# Patient Record
Sex: FEMALE | Race: WHITE | NOT HISPANIC OR LATINO | Employment: OTHER | URBAN - METROPOLITAN AREA
[De-identification: names, ages, dates, MRNs, and addresses within clinical notes are randomized per-mention and may not be internally consistent; named-entity substitution may affect disease eponyms.]

---

## 2017-01-02 ENCOUNTER — TELEPHONE (OUTPATIENT)
Dept: FAMILY MEDICINE | Facility: CLINIC | Age: 52
End: 2017-01-02

## 2017-01-02 NOTE — TELEPHONE ENCOUNTER
Received PA request for Lidocaine 5% patch from [facility]  Pharmacy Rejection Note: Plan does not cover this medication    Sig: Apply 1/2  patch to affected area on each arm at once for up to 12 h within a 24 h period.  Remove after 12 hours.  Disp: 28 per 30  TY: no    No previous PA on file for this med.    Dx: Lesion of ulnar nerve, unspecified laterality [G56.20]   Rationale: Tx of Lesion of ulnar nerve, unspecified laterality [G56.20]     Provided Ins: not provided  Provided Ins ID: [id number]  Provided Ins Phone # [phone] [facility]    PA submitted to [employer] via [employer], Keycode [id number]    Brigido OBRIEN (r)  SHAHEEN [place]ho

## 2017-01-03 NOTE — TELEPHONE ENCOUNTER
This patch is being used for neuropathic pain related to the ulnar nerve.  If the diagnosis of neuropathic pain is accepted we can appeal.  Please notify pt.    Lulu Lindsey

## 2017-01-03 NOTE — TELEPHONE ENCOUNTER
Received response from [email or web address]        Response faxed to the pharmacy, sent to be scanned, routed to the provider    Brigido OBRIEN (ewa)  [facility]

## 2017-01-03 NOTE — TELEPHONE ENCOUNTER
These are the qualifying questions, did I answer any of these wrong?      If you are going to appeal, get me the letter and I will fax it.          Brigido Linton RT (r)  [facility]

## 2017-01-03 NOTE — TELEPHONE ENCOUNTER
Nope, you answered correctly.  It is a neuropathy but not for any of those reasons.  Thanks.    Please let pt know.  Lulu Lindsey

## 2017-02-21 ENCOUNTER — COMMUNICATION - HEALTHEAST (OUTPATIENT)
Dept: SURGERY | Facility: CLINIC | Age: 52
End: 2017-02-21

## 2017-02-21 DIAGNOSIS — Z98.84 HX OF GASTRIC BYPASS: ICD-10-CM

## 2017-02-21 DIAGNOSIS — R12 HEARTBURN: ICD-10-CM

## 2017-02-24 DIAGNOSIS — E03.9 HYPOTHYROIDISM, UNSPECIFIED TYPE: ICD-10-CM

## 2017-02-24 NOTE — TELEPHONE ENCOUNTER
Routing refill request to provider for review/approval because:  Labs out of range:  TSH elevated  Camryn Miller RN

## 2017-02-24 NOTE — TELEPHONE ENCOUNTER
Levothyroxine 75mcg     Last Written Prescription Date: 11/02/2016 #30 x 0  Last filled 01/28/2017  Last Office Visit with G, P or [facility] prescribing provider: 08/02/2016 ANEESH Lindsey        TSH   Date Value Ref Range Status   07/08/2016 79.60 (H) 0.40 - 4.00 mU/L Final

## 2017-02-27 RX ORDER — LEVOTHYROXINE SODIUM 75 UG/1
75 TABLET ORAL DAILY
Qty: 30 TABLET | Refills: 0 | Status: SHIPPED | OUTPATIENT
Start: 2017-02-27 | End: 2017-04-19 | Stop reason: DRUGHIGH

## 2017-04-17 ENCOUNTER — OFFICE VISIT (OUTPATIENT)
Dept: FAMILY MEDICINE | Facility: CLINIC | Age: 52
End: 2017-04-17
Payer: MEDICARE

## 2017-04-17 VITALS
TEMPERATURE: 98.6 F | HEART RATE: 96 BPM | DIASTOLIC BLOOD PRESSURE: 64 MMHG | WEIGHT: 99.2 LBS | HEIGHT: 62 IN | SYSTOLIC BLOOD PRESSURE: 96 MMHG | BODY MASS INDEX: 18.26 KG/M2

## 2017-04-17 DIAGNOSIS — E03.9 HYPOTHYROIDISM, UNSPECIFIED TYPE: ICD-10-CM

## 2017-04-17 DIAGNOSIS — G56.23 LESIONS OF BOTH ULNAR NERVES: Primary | ICD-10-CM

## 2017-04-17 DIAGNOSIS — G47.00 PERSISTENT DISORDER OF INITIATING OR MAINTAINING SLEEP: ICD-10-CM

## 2017-04-17 DIAGNOSIS — M48.02 CERVICAL SPINAL STENOSIS: ICD-10-CM

## 2017-04-17 LAB — TSH SERPL DL<=0.05 MIU/L-ACNC: 21.7 MU/L (ref 0.4–4)

## 2017-04-17 PROCEDURE — 84443 ASSAY THYROID STIM HORMONE: CPT | Performed by: FAMILY MEDICINE

## 2017-04-17 PROCEDURE — 36415 COLL VENOUS BLD VENIPUNCTURE: CPT | Performed by: FAMILY MEDICINE

## 2017-04-17 PROCEDURE — 99214 OFFICE O/P EST MOD 30 MIN: CPT | Performed by: FAMILY MEDICINE

## 2017-04-17 RX ORDER — GABAPENTIN 100 MG/1
CAPSULE ORAL
Qty: 90 CAPSULE | Refills: 1 | Status: SHIPPED | OUTPATIENT
Start: 2017-04-17 | End: 2017-05-19

## 2017-04-17 RX ORDER — ZOLPIDEM TARTRATE 10 MG/1
10 TABLET ORAL
Qty: 30 TABLET | Refills: 5 | Status: SHIPPED | OUTPATIENT
Start: 2017-04-17 | End: 2017-10-20

## 2017-04-17 RX ORDER — TRAMADOL HYDROCHLORIDE 50 MG/1
50 TABLET ORAL EVERY 6 HOURS PRN
Qty: 28 TABLET | Refills: 0 | Status: SHIPPED | OUTPATIENT
Start: 2017-04-17 | End: 2017-12-26

## 2017-04-17 RX ORDER — GABAPENTIN 300 MG/1
CAPSULE ORAL
Qty: 90 CAPSULE | Refills: 0 | Status: SHIPPED | OUTPATIENT
Start: 2017-04-17 | End: 2017-04-17 | Stop reason: DRUGHIGH

## 2017-04-17 NOTE — PATIENT INSTRUCTIONS
We'll try adding the gabapentin for your arm pain.  This can cause drowsiness as well, especially combined with your topiramate and the ambien.  Please be cautious when you are first starting this.  We should follow up on this in about 1 month    I'll let you know the TSH results when available.  Please make sure to get on your vitamins right away.    I'm glad you are feeling better!

## 2017-04-17 NOTE — MR AVS SNAPSHOT
After Visit Summary   4/17/2017    Jackie Rodriguez    MRN: [id number]           Patient Information     Date Of Birth          1965        Visit Information        Provider Department      4/17/2017 1:20 PM Lulu Lindsey DO [facility]        Today's Diagnoses     Lesions of both ulnar nerves    -  1    Hypothyroidism, unspecified type        Persistent disorder of initiating or maintaining sleep        Cervical spinal stenosis C5-6, C6-7 discs and foraminal stenosis          Care Instructions    We'll try adding the gabapentin for your arm pain.  This can cause drowsiness as well, especially combined with your topiramate and the ambien.  Please be cautious when you are first starting this.  We should follow up on this in about 1 month    I'll let you know the TSH results when available.  Please make sure to get on your vitamins right away.    I'm glad you are feeling better!            Follow-ups after your visit        Who to contact     Normal or non-critical lab and imaging results will be communicated to you by Zeushart, letter or phone within 4 business days after the clinic has received the results. If you do not hear from us within 7 days, please contact the clinic through HelloWallett or phone. If you have a critical or abnormal lab result, we will notify you by phone as soon as possible.  Submit refill requests through Hemoteq or call your pharmacy and they will forward the refill request to us. Please allow 3 business days for your refill to be completed.          If you need to speak with a  for additional information , please call: [phone]           Additional Information About Your Visit        Hemoteq Information     Hem[employer]q gives you secure access to your electronic health record. If you see a primary care provider, you can also send messages to your care team and make appointments. If you have questions, please call your primary care clinic.  If  "you do not have a primary care provider, please call [phone] and they will assist you.        Care EveryWhere ID     This is your Care EveryWhere ID. This could be used by other organizations to access your [facility] medical records  [id number]        Your Vitals Were     Pulse Temperature Height Breastfeeding? BMI (Body Mass Index)       96 98.6  F (37  C) (Tympanic) 5' 1.75\" (1.568 m) No 18.29 kg/m2        Blood Pressure from Last 3 Encounters:   04/17/17 96/64   12/07/16 110/83   08/02/16 (!) 88/61    Weight from Last 3 Encounters:   04/17/17 99 lb 3.2 oz (45 kg)   12/07/16 96 lb (43.5 kg)   08/02/16 94 lb 9.6 oz (42.9 kg)              We Performed the Following     TSH          Today's Medication Changes          These changes are accurate as of: 4/17/17  2:08 PM.  If you have any questions, ask your nurse or doctor.               Start taking these medicines.        Dose/Directions    gabapentin 100 MG capsule   Commonly known as:  NEURONTIN   Used for:  Lesions of both ulnar nerves   Started by:  Lulu Lindsey DO        Take 1 capsule at bedtime for 3 days then 1 capsule AM and bedtime for 3 days then 1 capsule 3 times daily   Quantity:  90 capsule   Refills:  1         Stop taking these medicines if you haven't already. Please contact your care team if you have questions.     lidocaine 5 % Patch   Commonly known as:  LIDODERM   Stopped by:  Lulu Lindsey DO           QVAR 80 MCG/ACT Inhaler   Generic drug:  beclomethasone   Stopped by:  Lulu Lindsey DO           traZODone 50 MG tablet   Commonly known as:  DESYREL   Stopped by:  Lulu Lindsey DO                Where to get your medicines      These medications were sent to [facility] - [place], [place] - [address] AT [place] [address] [place] [address]     Phone:  [phone]     gabapentin 100 MG capsule         Some of these will need a paper prescription and others can be bought " over the counter.  Ask your nurse if you have questions.     Bring a paper prescription for each of these medications     traMADol 50 MG tablet    zolpidem 10 MG tablet                Primary Care Provider Office Phone # Fax #    Lulu Lindsey [phone]       [facility] [address] [place] [address]        Thank you!     Thank you for choosing [facility]  for your care. Our goal is always to provide you with excellent care. Hearing back from our patients is one way we can continue to improve our services. Please take a few minutes to complete the written survey that you may receive in the mail after your visit with us. Thank you!             Your Updated Medication List - Protect others around you: Learn how to safely use, store and throw away your medicines at www.disposemymeds.org.          This list is accurate as of: 4/17/17  2:08 PM.  Always use your most recent med list.                   Brand Name Dispense Instructions for use    cyanocobalamin 1000 MCG/ML injection    VITAMIN B12    1 mL    Inject 1ml under the skin every 4 to 6 weeks       fluticasone 50 MCG/ACT spray    FLONASE    1 Bottle    Spray 1-2 sprays into both nostrils daily       gabapentin 100 MG capsule    NEURONTIN    90 capsule    Take 1 capsule at bedtime for 3 days then 1 capsule AM and bedtime for 3 days then 1 capsule 3 times daily       levothyroxine 75 MCG tablet    SYNTHROID/LEVOTHROID    30 tablet    Take 1 tablet (75 mcg) by mouth daily       omeprazole 20 MG tablet     90 tablet    Take 1 tablet (20 mg) by mouth daily       TOPAMAX 100 MG tablet   Generic drug:  topiramate      Take 100 mg by mouth daily       traMADol 50 MG tablet    ULTRAM    28 tablet    Take 1 tablet (50 mg) by mouth every 6 hours as needed       vitamin D 2000 UNITS Caps      Take 4,000 Units by mouth daily Reported on 4/17/2017       WAL-DRYL-D 25-60 MG Tabs   Generic drug:  Diphenhydramine-Pseudoephed       Take by mouth as needed       ZOLMitriptan 2.5 MG tablet    ZOMIG    18 tablet    Take 1-2 tablets (2.5-5 mg) by mouth at onset of headache for migraine May repeat dose in 2 hours.  Do not exceed 10 mg in 24 hours       zolpidem 10 MG tablet    AMBIEN    30 tablet    Take 1 tablet (10 mg) by mouth nightly as needed for sleep Do not drive within 6 hours of most recent dose

## 2017-04-17 NOTE — PROGRESS NOTES
SUBJECTIVE:                                                    Jackie Rodriguez is a 52 year old female who presents to clinic today for the following health issues:      Depression and Anxiety Follow-Up    Status since last visit: Improved, stopped meds 6 months ago, felt she didn't need them anymore    Other associated symptoms:None    Complicating factors:     Significant life event: Yes-  Daughter passed away 2 years ago, she has been doing better since spending more time with her granddaughter     Current substance abuse: None    Stopped her fluoxetine.  Did not feel she needed it any more.  Her granddaughter is spending a lot more time with her and she finds that really helpful.  She has since left her  and is looking for a new home.  Feels this has been a healthy move for her.    PHQ-9 SCORE 3/21/2016 3/24/2016 7/8/2016   Total Score - - -   Total Score MyChart 9 (Mild depression) - -   Total Score - 9 5     JONH-7 SCORE 1/8/2016 3/24/2016   Total Score 9 0        PHQ-9  English      PHQ-9   Any Language     GAD7     Hypothyroidism Follow-up      Since last visit, patient describes the following symptoms: Weight stable, no hair loss, no skin changes, no constipation, no loose stools       Amount of exercise or physical activity: 6-7 days/week for an average of greater than 60 minutes    Problems taking medications regularly: No    Medication side effects: none    Diet: regular (no restrictions)    Has not followed up with her bariatric surgeon or with the dietician.  Does report she is eating well and getting adequate protein.  Weight has stabilized.  She notes she is in general taking her vitamins but has missed some recently due to her move    *  Has been unable to get her lidoderm patches due to change in formulary.  She has tried some over the counter topicals without help.  Wondering about options for her pain.  Has been on Lyrica in the past but stopped as she no longer needed it.  Pain remains  in jesús arms, elbows and wrists related to her ulnar neuropathy and possible a cervical radiculopathy as well.    Problem list and histories reviewed & adjusted, as indicated.  Additional history: as documented    Reviewed and updated as needed this visit by clinical staff  Tobacco  Allergies  Meds  Med Hx  Surg Hx  Fam Hx  Soc Hx      Reviewed and updated as needed this visit by Provider  Tobacco  Med Hx  Surg Hx  Fam Hx  Soc Hx      ROS: Remainder of Constitutional, CV, Respiratory, GI,  negative with exception of that mentioned above    PE:  VS as above   Gen:  Thin, WD/WH female in NAD   Heart:  RRR without murmur, nl S1, S2, no rubs or gallops   Lungs CTA jesús without rales/ronchi/wheezes   Psych: Alert and oriented times 3; coherent speech, normal   rate and volume, able to articulate logical thoughts, able   to abstract reason, no tangential thoughts, no hallucinations or delusions  Her affect is bright and appropriate   Ext:  No pedal edema    A?P:      ICD-10-CM    1. Lesions of both ulnar nerves G56.23 gabapentin (NEURONTIN) 100 MG capsule     DISCONTINUED: gabapentin (NEURONTIN) 300 MG capsule   2. Hypothyroidism, unspecified type E03.9 TSH   3. Persistent disorder of initiating or maintaining sleep G47.00 zolpidem (AMBIEN) 10 MG tablet   4. Cervical spinal stenosis C5-6, C6-7 discs and foraminal stenosis M48.02 traMADol (ULTRAM) 50 MG tablet    consider TENS unit.  continue with pain management clinic.      Patient Instructions   We'll try adding the gabapentin for your arm pain.  This can cause drowsiness as well, especially combined with your topiramate and the ambien.  Please be cautious when you are first starting this.  We should follow up on this in about 1 month    I'll let you know the TSH results when available.  Please make sure to get on your vitamins right away.    I'm glad you are feeling better!

## 2017-04-19 ENCOUNTER — MYC MEDICAL ADVICE (OUTPATIENT)
Dept: FAMILY MEDICINE | Facility: CLINIC | Age: 52
End: 2017-04-19

## 2017-04-19 DIAGNOSIS — E03.9 HYPOTHYROIDISM, UNSPECIFIED TYPE: Primary | ICD-10-CM

## 2017-04-19 RX ORDER — LEVOTHYROXINE SODIUM 88 UG/1
88 TABLET ORAL DAILY
Qty: 90 TABLET | Refills: 0 | Status: SHIPPED | OUTPATIENT
Start: 2017-04-19 | End: 2017-07-21

## 2017-05-19 DIAGNOSIS — G56.23 LESIONS OF BOTH ULNAR NERVES: ICD-10-CM

## 2017-05-22 NOTE — TELEPHONE ENCOUNTER
Routing refill request to provider for review/approval because:  Drug not on the [facility] refill protocol     Thank you  Beata FLYNN RN

## 2017-05-22 NOTE — TELEPHONE ENCOUNTER
Gabapentin 100mg      Last Written Prescription Date: 04/17/2017  #90 x 1  Last filled 05/15/2017  Last Office Visit with G, P or [facility] prescribing provider: 04/17/2017 ANEESH Lindsey       Creatinine   Date Value Ref Range Status   07/08/2016 1.25 (H) 0.52 - 1.04 mg/dL Final     Lab Results   Component Value Date    AST 39 07/08/2016     Lab Results   Component Value Date    ALT 70 07/08/2016     BP Readings from Last 3 Encounters:   04/17/17 96/64   12/07/16 110/83   08/02/16 (!) 88/61

## 2017-05-23 ENCOUNTER — COMMUNICATION - HEALTHEAST (OUTPATIENT)
Dept: SURGERY | Facility: CLINIC | Age: 52
End: 2017-05-23

## 2017-05-23 DIAGNOSIS — Z98.84 HX OF GASTRIC BYPASS: ICD-10-CM

## 2017-05-23 DIAGNOSIS — R12 HEARTBURN: ICD-10-CM

## 2017-05-23 RX ORDER — GABAPENTIN 100 MG/1
100 CAPSULE ORAL 3 TIMES DAILY
Qty: 90 CAPSULE | Refills: 0 | Status: SHIPPED | OUTPATIENT
Start: 2017-05-23 | End: 2018-01-12 | Stop reason: SINTOL

## 2017-05-25 ENCOUNTER — MYC MEDICAL ADVICE (OUTPATIENT)
Dept: FAMILY MEDICINE | Facility: CLINIC | Age: 52
End: 2017-05-25

## 2017-05-26 ENCOUNTER — MYC MEDICAL ADVICE (OUTPATIENT)
Dept: FAMILY MEDICINE | Facility: CLINIC | Age: 52
End: 2017-05-26

## 2017-05-26 NOTE — LETTER
[address]  [address]  [phone]      May 26, 2017      Jackie Rodriguez  [address]   [place] [address]              To whom it may concern,     Jackie Rodriguez is currently under my medical care.  She suffers from ulnar neuropathy which has hindered her ability to seek housing due to pain.  Please allow her an extension on her housing voucher.    Sincerely,    Lulu Lindsey, DO

## 2017-05-30 NOTE — TELEPHONE ENCOUNTER
Called pt and obtained fax number for letter.  Faxed letter successfully and pt notified.      Peyton Mckinney, [place]

## 2017-07-15 ENCOUNTER — HEALTH MAINTENANCE LETTER (OUTPATIENT)
Age: 52
End: 2017-07-15

## 2017-07-21 DIAGNOSIS — E03.9 HYPOTHYROIDISM, UNSPECIFIED TYPE: ICD-10-CM

## 2017-07-21 RX ORDER — LEVOTHYROXINE SODIUM 88 UG/1
TABLET ORAL
Qty: 30 TABLET | Refills: 0 | Status: SHIPPED | OUTPATIENT
Start: 2017-07-21 | End: 2017-09-30

## 2017-07-21 NOTE — TELEPHONE ENCOUNTER
Routing refill request to provider for review/approval because:  TSH abnormal, Genesis Khalil RN

## 2017-07-21 NOTE — TELEPHONE ENCOUNTER
Levothyroxine 88mcg     Last Written Prescription Date: 04/19/2017 #90 x 0  Last filled 04/19/2017  Last Office Visit with G, P or [facility] prescribing provider: 04/17/2017 ANEESH Lindsey        TSH   Date Value Ref Range Status   04/17/2017 21.70 (H) 0.40 - 4.00 mU/L Final

## 2017-07-26 NOTE — TELEPHONE ENCOUNTER
Filled for 30 days  Please call pt and ask her to come in for follow up blood work before her next refill  Lulu Lindsey

## 2017-09-30 DIAGNOSIS — E03.9 HYPOTHYROIDISM, UNSPECIFIED TYPE: ICD-10-CM

## 2017-10-02 RX ORDER — LEVOTHYROXINE SODIUM 88 UG/1
88 TABLET ORAL DAILY
Qty: 30 TABLET | Refills: 0 | Status: SHIPPED | OUTPATIENT
Start: 2017-10-02 | End: 2017-12-26

## 2017-10-02 NOTE — TELEPHONE ENCOUNTER
Levothyroxine 88mcg     Last Written Prescription Date: 07/21/2017 #30 x 0  Last filled 07/21/2017  Last Office Visit with G, P or [facility] prescribing provider: 04/17/2017 ANEESH Ramirez        TSH   Date Value Ref Range Status   04/17/2017 21.70 (H) 0.40 - 4.00 mU/L Final

## 2017-10-02 NOTE — TELEPHONE ENCOUNTER
Tried to LM. Mailbox is full and cannot LM.   Patient was sent a [facility] message on last refill that she needs to be seen before further refills. Please advise.  Poonam Tobar RN

## 2017-10-20 DIAGNOSIS — G47.00 PERSISTENT DISORDER OF INITIATING OR MAINTAINING SLEEP: ICD-10-CM

## 2017-10-20 NOTE — TELEPHONE ENCOUNTER
zolpidem (AMBIEN) 10 MG tablet      Last Written Prescription Date:  04/17/17  Last Fill Quantity: 30,   # refills: 5  LAST Office visit:   04/17/17 AIDEN Ivy refill request to provider for review/approval because:  Drug not on the [facility], [facility] or [facility] refill protocol or controlled substance

## 2017-10-21 ENCOUNTER — HOSPITAL ENCOUNTER (EMERGENCY)
Facility: CLINIC | Age: 52
Discharge: HOME | End: 2017-10-21
Attending: NURSE PRACTITIONER | Admitting: NURSE PRACTITIONER
Payer: MEDICARE

## 2017-10-21 VITALS
OXYGEN SATURATION: 100 % | RESPIRATION RATE: 18 BRPM | TEMPERATURE: 97.9 F | HEART RATE: 90 BPM | SYSTOLIC BLOOD PRESSURE: 111 MMHG | DIASTOLIC BLOOD PRESSURE: 62 MMHG
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
## 2017-10-21 PROCEDURE — 99213 OFFICE O/P EST LOW 20 MIN: CPT

## 2017-10-21 PROCEDURE — 99213 OFFICE O/P EST LOW 20 MIN: CPT | Performed by: NURSE PRACTITIONER

## 2017-10-21 RX ORDER — DOXYCYCLINE 100 MG/1
200 CAPSULE ORAL ONCE
Qty: 2 CAPSULE | Refills: 0 | Status: SHIPPED | OUTPATIENT
Start: 2017-10-21 | End: 2017-10-21

## 2017-10-21 NOTE — ED AVS SNAPSHOT
" [facility] Emergency Department    [address]    [place] [address]    Phone:  [phone]    Fax:  [phone]                                       Jackie Rodriguez   MRN: [id number]    Department:  [facility] Emergency Department   Date of Visit:  10/21/2017           Patient Information     Date Of Birth          1965        Your diagnoses for this visit were:     Tick bite, initial encounter        You were seen by Yulissa Bill APRN CNP.      Follow-up Information     Follow up with Lulu Lindsey DO.    Specialties:  Family Practice, Urgent Care    Why:  As needed    Contact information:    [address]   [place] [address]  [phone]          Discharge Instructions         Tick Bites  Ticks are small arachnids that feed on the blood of rodents, rabbits, birds, deer, dogs, and people. A tick bite may cause a reaction like a spider bite. You may have redness, itching, and slight swelling at the site. Sometimes you may have no reaction where the tick bit you.  Ticks may gorge themselves for days before you find and remove them. The bites themselves aren't cause for concern. But ticks can carry and pass on illnesses such as Lyme disease and [place] spotted fever. Both diseases begin with a rash and symptoms similar to the flu. In advanced stages, these diseases can be quite serious.     A \"bull's eye\" rash is a common symptom of Lyme disease.   When to go to the emergency department (ED)  Not all ticks carry disease. And a tick must stay attached for at least 24 hours to infect you. If you find a tick, don't panic. Try to carefully remove it with tweezers. Grasp the tick near its head and pull without twisting. If you can't easily dislodge the tick or if you leave the head in your skin, get medical care right away.  What to expect in the ED    The tick or any parts of the tick will be removed and the bite will be cleaned.    To prevent disease, you may be given " antibiotics. Both Lyme disease and [place] spotted fever respond quickly to these medicines.    You may be asked to see your healthcare provider for a blood test to check for Lyme disease.  Follow-up care  Some states and [facility] have services that test ticks for Lyme disease and other diseases. Check with your local officials to see if this service is available in your area.  If you remove a tick yourself, watch for signs of a tick-borne illness. Symptoms may show up within a few days or weeks after a bite. Call your healthcare provider if you notice any of the following:    Rash. The rash may spread outward in a ring from a hard white lump. Or it may move up your arms and legs to your chest.    Chills and fever    Body aches and joint pain    Severe headache  Date Last Reviewed: 12/1/2016 2000-2017 [employer]. [address] [place] [address]. All rights reserved. This information is not intended as a substitute for professional medical care. Always follow your healthcare professional's instructions.          24 Hour Appointment Hotline       To make an appointment at any [facility], call [phone] ([phone]). If you don't have a family doctor or clinic, we will help you find one. [place] [place] clinics are conveniently located to serve the needs of you and your family.             Review of your medicines      START taking        Dose / Directions Last dose taken    doxycycline 100 MG capsule   Commonly known as:  VIBRAMYCIN   Dose:  200 mg   Quantity:  2 capsule        Take 2 capsules (200 mg) by mouth once for 1 dose   Refills:  0          Our records show that you are taking the medicines listed below. If these are incorrect, please call your family doctor or clinic.        Dose / Directions Last dose taken    cyanocobalamin 1000 MCG/ML injection   Commonly known as:  VITAMIN B12   Quantity:  1 mL        Inject 1ml under the skin every 4 to 6 weeks   Refills:  11         fluticasone 50 MCG/ACT spray   Commonly known as:  FLONASE   Dose:  1-2 spray   Quantity:  1 Bottle        Spray 1-2 sprays into both nostrils daily   Refills:  11        gabapentin 100 MG capsule   Commonly known as:  NEURONTIN   Dose:  100 mg   Quantity:  90 capsule        Take 1 capsule (100 mg) by mouth 3 times daily   Refills:  0        levothyroxine 88 MCG tablet   Commonly known as:  SYNTHROID/LEVOTHROID   Dose:  88 mcg   Quantity:  30 tablet        Take 1 tablet (88 mcg) by mouth daily Must be seen prior to additional refills   Refills:  0        omeprazole 20 MG tablet   Dose:  20 mg   Quantity:  90 tablet        Take 1 tablet (20 mg) by mouth daily   Refills:  0        TOPAMAX 100 MG tablet   Dose:  100 mg   Generic drug:  topiramate        Take 100 mg by mouth daily   Refills:  0        traMADol 50 MG tablet   Commonly known as:  ULTRAM   Dose:  50 mg   Quantity:  28 tablet        Take 1 tablet (50 mg) by mouth every 6 hours as needed   Refills:  0        vitamin D 2000 UNITS Caps   Dose:  4000 Units        Take 4,000 Units by mouth daily Reported on 4/17/2017   Refills:  0        WAL-DRYL-D 25-60 MG Tabs   Generic drug:  Diphenhydramine-Pseudoephed        Take by mouth as needed   Refills:  0        ZOLMitriptan 2.5 MG tablet   Commonly known as:  ZOMIG   Dose:  2.5-5 mg   Quantity:  18 tablet        Take 1-2 tablets (2.5-5 mg) by mouth at onset of headache for migraine May repeat dose in 2 hours.  Do not exceed 10 mg in 24 hours   Refills:  1        zolpidem 10 MG tablet   Commonly known as:  AMBIEN   Dose:  10 mg   Quantity:  30 tablet        Take 1 tablet (10 mg) by mouth nightly as needed for sleep Do not drive within 6 hours of most recent dose   Refills:  5                Prescriptions were sent or printed at these locations (1 Prescription)                   [place], [place] - [address]   [address] [address]    Telephone:  [phone]   Fax:   [phone]   Hours:                  E-Prescribed (1 of 1)         doxycycline (VIBRAMYCIN) 100 MG capsule                Orders Needing Specimen Collection     None      Pending Results     No orders found from 10/19/2017 to 10/22/2017.            Pending Culture Results     No orders found from 10/19/2017 to 10/22/2017.            Pending Results Instructions     If you had any lab results that were not finalized at the time of your Discharge, you can call the ED Lab Result RN at [phone]. You will be contacted by this team for any positive Lab results or changes in treatment. The nurses are available 7 days a week from 10A to 6:30P.  You can leave a message 24 hours per day and they will return your call.        Test Results From Your Hospital Stay               Thank you for choosing [facility]       Thank you for choosing [facility] for your care. Our goal is always to provide you with excellent care. Hearing back from our patients is one way we can continue to improve our services. Please take a few minutes to complete the written survey that you may receive in the mail after you visit with us. Thank you!        scrible[employer]onoPlot Information     [employer] gives you secure access to your electronic health record. If you see a primary care provider, you can also send messages to your care team and make appointments. If you have questions, please call your primary care clinic.  If you do not have a primary care provider, please call [phone] and they will assist you.        Care EveryWhere ID     This is your Care EveryWhere ID. This could be used by other organizations to access your [facility] medical records  [id number]        Equal Access to Services     ADI VILLEGAS AH: Gio Dockery, waphilda jaciel, qaybta kaalmada margarito novak adedylan durand. So [facility] [phone].    ATENCIÓN: Si habla español, tiene a chavez disposición servicios gratuitos de asistencia lingüística. Llame al  [phone].    We comply with applicable federal civil rights laws and [place] laws. We do not discriminate on the basis of race, color, national origin, age, disability, sex, sexual orientation, or gender identity.            After Visit Summary       This is your record. Keep this with you and show to your community pharmacist(s) and doctor(s) at your next visit.

## 2017-10-21 NOTE — DISCHARGE INSTRUCTIONS
"  Tick Bites  Ticks are small arachnids that feed on the blood of rodents, rabbits, birds, deer, dogs, and people. A tick bite may cause a reaction like a spider bite. You may have redness, itching, and slight swelling at the site. Sometimes you may have no reaction where the tick bit you.  Ticks may gorge themselves for days before you find and remove them. The bites themselves aren't cause for concern. But ticks can carry and pass on illnesses such as Lyme disease and [place] spotted fever. Both diseases begin with a rash and symptoms similar to the flu. In advanced stages, these diseases can be quite serious.     A \"bull's eye\" rash is a common symptom of Lyme disease.   When to go to the emergency department (ED)  Not all ticks carry disease. And a tick must stay attached for at least 24 hours to infect you. If you find a tick, don't panic. Try to carefully remove it with tweezers. Grasp the tick near its head and pull without twisting. If you can't easily dislodge the tick or if you leave the head in your skin, get medical care right away.  What to expect in the ED    The tick or any parts of the tick will be removed and the bite will be cleaned.    To prevent disease, you may be given antibiotics. Both Lyme disease and [place] spotted fever respond quickly to these medicines.    You may be asked to see your healthcare provider for a blood test to check for Lyme disease.  Follow-up care  Some states and [facility] have services that test ticks for Lyme disease and other diseases. Check with your local officials to see if this service is available in your area.  If you remove a tick yourself, watch for signs of a tick-borne illness. Symptoms may show up within a few days or weeks after a bite. Call your healthcare provider if you notice any of the following:    Rash. The rash may spread outward in a ring from a hard white lump. Or it may move up your arms and legs to your chest.    Chills and " fever    Body aches and joint pain    Severe headache  Date Last Reviewed: 12/1/2016 2000-2017 [employer]. [address] [place] [address], [place] [address]. All rights reserved. This information is not intended as a substitute for professional medical care. Always follow your healthcare professional's instructions.

## 2017-10-21 NOTE — ED AVS SNAPSHOT
[facility] Emergency Department    [address] [address]    Phone:  [phone]    Fax:  [phone]                                       Jackie Rodriguez   MRN: [id number]    Department:  [facility] Emergency Department   Date of Visit:  10/21/2017           After Visit Summary Signature Page     I have received my discharge instructions, and my questions have been answered. I have discussed any challenges I see with this plan with the nurse or doctor.    ..........................................................................................................................................  Patient/Patient Representative Signature      ..........................................................................................................................................  Patient Representative Print Name and Relationship to Patient    ..................................................               ................................................  Date                                            Time    ..........................................................................................................................................  Reviewed by Signature/Title    ...................................................              ..............................................  Date                                                            Time

## 2017-10-21 NOTE — ED NOTES
Patient here for tick bite on the left upper hip - noticed and pulled off this am.  Patient presents ambulatory to the urgent care.

## 2017-10-22 NOTE — ED PROVIDER NOTES
History     Chief Complaint   Patient presents with     Insect Bite     on R hip     HPI  Jackie Rodriguez is a 52 year old female who present to urgent care for evaluation of tick bite. Patient was in a wooded area getting some things out of her RV yesterday.  She found a wood tick on her right hip today. Tick was embedded. Patient thought the tick could have been on her for longer than 24 hours. She brought the tick with her and it is a deer tick. Patient otherwise is feeling well.    Problem List:    Patient Active Problem List    Diagnosis Date Noted     Situational depression 12/08/2015     Priority: Medium     S/P bariatric surgery 12/08/2015     Priority: Medium     Postsurgical malabsorption, not elsewhere classified  12/08/2015     Priority: Medium     Dyspareunia 10/16/2013     Priority: Medium     Cervical spinal stenosis C5-6, C6-7 discs and foraminal stenosis 10/03/2012     Priority: Medium     T wave inversion in EKG 02/28/2012     Priority: Medium     Vitamin D deficiency 02/23/2012     Priority: Medium     Finger fracture, right little finger 02/20/2012     Priority: Medium     Screening for diabetes mellitus 01/18/2012     Priority: Medium     24 hour contact given to patient 01/16/2012     Priority: Medium     EMERGENCY CARE PLAN  Presenting Problem Signs and Symptoms Treatment Plan    Questions or conerns during clinic hours    I will call the clinic directly     Questions or conerns outside clinic hours    I will call the 24 hour nurse line at [phone]    Patient needs to schedule an appointment    I will call the 24 hour scheduling team at [phone] or clinic directly    Same day treatment     I will call the clinic first, nurse line if after hours, urgent care and express care if needed                                 LINA (obstructive sleep apnea) 10/04/2011     Priority: Medium     Moderate LINA (AHI 22.4, hima desat 85%) with potential that AHI was under-reported due to no supine REM  captured       Headache 09/01/2011     Priority: Medium     Cough headache - negative eval by MRI/MRA (except for sinus inflammation)  Intollerant to elavil.    Other option include: for migraines - metoprolol, topamax - if for pain - Neurontin, cymbalta, effexor.   Start with sinus related treatment  Problem list name updated by automated process. Provider to review       Upper back strain 07/11/2011     Priority: Medium     Enlarged lymph node  per mammo.   left axilla 07/11/2011     Priority: Medium     Bilateral arm weakness 07/11/2011     Priority: Medium     History of drug abuse meth sober since 2007 06/01/2011     Priority: Medium     CARDIOVASCULAR SCREENING; LDL GOAL LESS THAN 160 04/26/2011     Priority: Medium     Bilateral ulnar neuropathy - cubital tunnel s/p b/l transpositions 12/31/2007     Priority: Medium     Electrodiagnostic Test done at [facility].  1/24/13 Per Dr Purvis to eval Upper Limbs.  Normal Study. see scan report       Chronic b/l wirst/elbow pain 12/31/2007     Priority: Medium     December 31, 2007  Meds: Restart elavil, recommend PM&R  Failed pain management clinic       Persistent disorder of initiating or maintaining sleep 09/18/2007     Priority: Medium     Exposure to Hep C 09/18/2007     Priority: Medium     Hypothyroidism 04/11/2005     Priority: Medium     May 25, 2013 TSH now 10 on 50 mcg, will increase to 75 mcg, repeat TSH in 2 months.   Problem list name updated by automated process. Provider to review       Health Care Home 03/21/2013     Priority: Low     EMERGENCY CARE PLAN  March 21, 2013: No current Care Coordination follow up planned. Please refer if Care Coordination services are needed.    Presenting Problem Signs and Symptoms Treatment Plan   Questions or concerns   during clinic hours   I will call my clinic directly:  [address] [address] [phone].   Questions or concerns outside clinic hours   I will call the  24 hour nurse line at   [phone] or [phone].   Need to schedule an appointment   I will call the 24 hour scheduling team at [phone] or my clinic directly at [phone].    Same day treatment     I will call my clinic first, nurse line if after hours, urgent care and express care if needed.   Clinic care coordination services (regular clinic hours)     I will call a clinic care coordinator directly:     Kel Hill RN  Mon, Tues, Fri - [phone]  Wed, Thurs - [phone]    Kady Soriano SW:    [phone]    Or call my clinic at [phone] and ask to speak with care coordination.   Crisis Services: Behavioral or Mental Health  [facility] 24 Hour Phone Line  [phone]    [facility] 24 Hour Crisis Services  [phone]    [facility] [phone]    [facility]   [phone]       Emergency treatment -- Immediately    CAll 911       DX V65.8 REPLACED WITH [id number] (04/08/2013)          Past Medical History:    Past Medical History:   Diagnosis Date     History of drug abuse 6/1/2011     Hypothyroidism      Left ovarian cyst 10/16/2013     Overweight BMI 37 1/16/2012     S/P hysterectomy        Past Surgical History:    Past Surgical History:   Procedure Laterality Date     BARIATRIC SURGERY  3/6/2012    lola-en-y     carple tunnel      bilat      COLONOSCOPY N/A 12/7/2016    Procedure: COLONOSCOPY;  Surgeon: Frankie Gregorio MD;  Location: [facility]     HYSTERECTOMY, PAP NO LONGER INDICATED  1986    has left ovary     INJECT EPIDURAL CERVICAL  10/8/2012    Procedure: INJECT EPIDURAL CERVICAL;  ROMINA Cervical--;  Surgeon: Provider, Generic Anesthesia;  Location: [facility]     LAPAROSCOPIC SALPINGO-OOPHORECTOMY  10/21/2013    Procedure: LAPAROSCOPIC SALPINGO-OOPHORECTOMY;  Laparoscopic Left Salpingo-Oophorectomy;  Surgeon: Willie Leblanc MD;  Location: [facility]     TRANSPOSITION ULNAR NERVE (ELBOW)      right   x 2  left elbow x1       Family History:    Family History   Problem Relation Age of Onset     Arthritis Mother      Obesity Mother      Thyroid Disease Mother      Neurologic Disorder Mother      migraine     Arthritis Father      CANCER Father      lung ca     Thyroid Disease Brother      Neurologic Disorder Brother      migraine     Thyroid Disease Sister      Thyroid Disease Sister      HEART DISEASE Sister      Obesity Sister      Psychotic Disorder Sister      Neurologic Disorder Sister      migraines     Obesity Sister      Neurologic Disorder Daughter      migraines       Social History:  Marital Status:  Legally  [3]  Social History   Substance Use Topics     Smoking status: Former Smoker     Packs/day: 0.50     Quit date: 6/28/2008     Smokeless tobacco: Never Used     Alcohol use No        Medications:      doxycycline (VIBRAMYCIN) 100 MG capsule   levothyroxine (SYNTHROID/LEVOTHROID) 88 MCG tablet   gabapentin (NEURONTIN) 100 MG capsule   zolpidem (AMBIEN) 10 MG tablet   traMADol (ULTRAM) 50 MG tablet   fluticasone (FLONASE) 50 MCG/ACT nasal spray   Cholecalciferol (VITAMIN D) 2000 UNITS CAPS   omeprazole 20 MG tablet   topiramate (TOPAMAX) 100 MG tablet   ZOLMitriptan (ZOMIG) 2.5 MG tablet   Diphenhydramine-Pseudoephed (WAL-DRYL-D) 25-60 MG TABS   cyanocobalamin 1000 MCG/ML injection         Review of Systems  As mentioned above in the history present illness. All other systems were reviewed and are negative.    Physical Exam   BP: 111/62  Pulse: 90  Temp: 97.9  F (36.6  C)  Resp: 18  SpO2: 100 %      Physical Exam    GENERAL APPEARANCE: healthy, alert and no distress  RESP: lungs clear to auscultation - no rales, rhonchi or wheezes  CV: regular rates and rhythm, normal S1 S2, no murmur noted  SKIN:  No erythema, rash, or ecchymosis    ED Course     ED Course     Procedures               Labs Ordered and Resulted from Time of ED Arrival Up to the Time of Departure from the ED - No data to  display    Assessments & Plan (with Medical Decision Making)     I have reviewed the nursing notes.    I have reviewed the findings, diagnosis, plan and need for follow up with the patient.      Discharge Medication List as of 10/21/2017  6:05 PM      START taking these medications    Details   doxycycline (VIBRAMYCIN) 100 MG capsule Take 2 capsules (200 mg) by mouth once for 1 dose, Disp-2 capsule, R-0, E-Prescribe             Final diagnoses:   Tick bite, initial encounter   -prophylactic treatment indicated.     10/21/2017   [facility] EMERGENCY DEPARTMENT     Yulissa Bill APRN CNP  10/21/17 2013

## 2017-10-23 RX ORDER — ZOLPIDEM TARTRATE 10 MG/1
TABLET ORAL
Qty: 30 TABLET | Refills: 0 | Status: SHIPPED | OUTPATIENT
Start: 2017-10-23 | End: 2017-12-26

## 2017-12-26 ENCOUNTER — TELEPHONE (OUTPATIENT)
Dept: FAMILY MEDICINE | Facility: CLINIC | Age: 52
End: 2017-12-26

## 2017-12-26 DIAGNOSIS — E03.9 HYPOTHYROIDISM, UNSPECIFIED TYPE: ICD-10-CM

## 2017-12-26 DIAGNOSIS — M48.02 CERVICAL SPINAL STENOSIS: ICD-10-CM

## 2017-12-26 DIAGNOSIS — G47.00 PERSISTENT DISORDER OF INITIATING OR MAINTAINING SLEEP: ICD-10-CM

## 2017-12-26 NOTE — TELEPHONE ENCOUNTER
Levothyroxine 88mcg      Last Written Prescription Date:  10/02/2017 #30 x 0  Last filled 10/20/2017    Tramadol 50mg      Last Written Prescription Date:  04/17/2017 #28 x 0  Last filled 04/18/2017    Zolpidem 10mg      Last Written Prescription Date:  10/23/2017 #30 x 0  Last filled 10/23/2017    Last Office Visit: 04/17/2017 ANEESH Lindsey  Future Office visit:   none    Routing refill request to provider for review/approval because:  Drug not on the [facility], P or [facility] refill protocol or controlled substance

## 2017-12-28 RX ORDER — LEVOTHYROXINE SODIUM 88 UG/1
TABLET ORAL
Qty: 30 TABLET | Refills: 0 | Status: SHIPPED | OUTPATIENT
Start: 2017-12-28 | End: 2018-03-01

## 2017-12-28 RX ORDER — TRAMADOL HYDROCHLORIDE 50 MG/1
TABLET ORAL
Qty: 28 TABLET | Refills: 0 | Status: SHIPPED | OUTPATIENT
Start: 2017-12-28 | End: 2019-01-16

## 2017-12-28 RX ORDER — ZOLPIDEM TARTRATE 10 MG/1
TABLET ORAL
Qty: 30 TABLET | Refills: 0 | Status: SHIPPED | OUTPATIENT
Start: 2017-12-28 | End: 2018-03-01

## 2017-12-28 NOTE — TELEPHONE ENCOUNTER
Script for Ambien faxed to [facility] in [place].    Neena Freeman, [facility]

## 2017-12-28 NOTE — TELEPHONE ENCOUNTER
Spoke with pt and she states that she has not been taking her levothyroxine regularly.  Has been out of the medication for the last week.  Pt's daughter recently passed away and she has been raising her 4 year old granddaughter.  Pt is now scheduled on 1/9/17 with Dr. Lindsey in clinic for an Annual Wellness Visit.  Please advise on these prescriptions.    Cecille WISDOM RN

## 2018-01-12 ENCOUNTER — OFFICE VISIT (OUTPATIENT)
Dept: FAMILY MEDICINE | Facility: CLINIC | Age: 53
End: 2018-01-12
Payer: MEDICARE

## 2018-01-12 VITALS
BODY MASS INDEX: 18.51 KG/M2 | DIASTOLIC BLOOD PRESSURE: 70 MMHG | WEIGHT: 100.6 LBS | SYSTOLIC BLOOD PRESSURE: 114 MMHG | TEMPERATURE: 98.8 F | HEART RATE: 84 BPM | HEIGHT: 62 IN

## 2018-01-12 DIAGNOSIS — Z23 NEED FOR PROPHYLACTIC VACCINATION AND INOCULATION AGAINST INFLUENZA: ICD-10-CM

## 2018-01-12 DIAGNOSIS — E55.9 VITAMIN D DEFICIENCY: ICD-10-CM

## 2018-01-12 DIAGNOSIS — K91.2 POSTSURGICAL MALABSORPTION, NOT ELSEWHERE CLASSIFIED (CODE): ICD-10-CM

## 2018-01-12 DIAGNOSIS — Z12.31 ENCOUNTER FOR SCREENING MAMMOGRAM FOR BREAST CANCER: ICD-10-CM

## 2018-01-12 DIAGNOSIS — E03.9 HYPOTHYROIDISM, UNSPECIFIED TYPE: ICD-10-CM

## 2018-01-12 DIAGNOSIS — Z98.84 S/P BARIATRIC SURGERY: ICD-10-CM

## 2018-01-12 DIAGNOSIS — Z00.00 ENCOUNTER FOR ROUTINE ADULT HEALTH EXAMINATION WITHOUT ABNORMAL FINDINGS: Primary | ICD-10-CM

## 2018-01-12 LAB
ERYTHROCYTE [DISTWIDTH] IN BLOOD BY AUTOMATED COUNT: 13.1 % (ref 10–15)
HCT VFR BLD AUTO: 41.9 % (ref 35–47)
HGB BLD-MCNC: 13.7 G/DL (ref 11.7–15.7)
MCH RBC QN AUTO: 29.7 PG (ref 26.5–33)
MCHC RBC AUTO-ENTMCNC: 32.7 G/DL (ref 31.5–36.5)
MCV RBC AUTO: 91 FL (ref 78–100)
PLATELET # BLD AUTO: 245 10E9/L (ref 150–450)
RBC # BLD AUTO: 4.62 10E12/L (ref 3.8–5.2)
WBC # BLD AUTO: 8.6 10E9/L (ref 4–11)

## 2018-01-12 PROCEDURE — 80053 COMPREHEN METABOLIC PANEL: CPT | Performed by: FAMILY MEDICINE

## 2018-01-12 PROCEDURE — 99396 PREV VISIT EST AGE 40-64: CPT | Mod: 25 | Performed by: FAMILY MEDICINE

## 2018-01-12 PROCEDURE — 82746 ASSAY OF FOLIC ACID SERUM: CPT | Performed by: FAMILY MEDICINE

## 2018-01-12 PROCEDURE — 82607 VITAMIN B-12: CPT | Performed by: FAMILY MEDICINE

## 2018-01-12 PROCEDURE — 82728 ASSAY OF FERRITIN: CPT | Performed by: FAMILY MEDICINE

## 2018-01-12 PROCEDURE — 83540 ASSAY OF IRON: CPT | Performed by: FAMILY MEDICINE

## 2018-01-12 PROCEDURE — G0008 ADMIN INFLUENZA VIRUS VAC: HCPCS | Performed by: FAMILY MEDICINE

## 2018-01-12 PROCEDURE — 36415 COLL VENOUS BLD VENIPUNCTURE: CPT | Performed by: FAMILY MEDICINE

## 2018-01-12 PROCEDURE — 83550 IRON BINDING TEST: CPT | Performed by: FAMILY MEDICINE

## 2018-01-12 PROCEDURE — 90686 IIV4 VACC NO PRSV 0.5 ML IM: CPT | Performed by: FAMILY MEDICINE

## 2018-01-12 PROCEDURE — 82306 VITAMIN D 25 HYDROXY: CPT | Performed by: FAMILY MEDICINE

## 2018-01-12 PROCEDURE — 85027 COMPLETE CBC AUTOMATED: CPT | Performed by: FAMILY MEDICINE

## 2018-01-12 PROCEDURE — 83970 ASSAY OF PARATHORMONE: CPT | Performed by: FAMILY MEDICINE

## 2018-01-12 RX ORDER — TIZANIDINE 2 MG/1
1 TABLET ORAL
COMMUNITY

## 2018-01-12 NOTE — LETTER
January 22, 2018      Jackie KYLEE Michael  [address]   [place] [address]        Dear ,    We are writing to inform you of your test results.    Your labs did not look too bad which is great.  You really do need to restart your vitamins however before they decline further.  Please restart these right away.    Your electrolytes look okay but your kidney function is a bit low, only a little improved from last year.  Please make sure you are avoiding ll ibuprofen products that can decrease kidney function further.    Your vitamin D was very low.  I would recommend taking 5000 units of vitamin D3 over the counter daily for 8-12 weeks and then rechecking the D level.     Resulted Orders   CBC with platelets   Result Value Ref Range    WBC 8.6 4.0 - 11.0 10e9/L    RBC Count 4.62 3.8 - 5.2 10e12/L    Hemoglobin 13.7 11.7 - 15.7 g/dL    Hematocrit 41.9 35.0 - 47.0 %    MCV 91 78 - 100 fl    MCH 29.7 26.5 - 33.0 pg    MCHC 32.7 31.5 - 36.5 g/dL    RDW 13.1 10.0 - 15.0 %    Platelet Count 245 150 - 450 10e9/L   Comprehensive metabolic panel   Result Value Ref Range    Sodium 141 133 - 144 mmol/L    Potassium 3.5 3.4 - 5.3 mmol/L    Chloride 109 94 - 109 mmol/L    Carbon Dioxide 28 20 - 32 mmol/L    Anion Gap 4 3 - 14 mmol/L    Glucose 114 (H) 70 - 99 mg/dL      Comment:      Non Fasting    Urea Nitrogen 13 7 - 30 mg/dL    Creatinine 1.13 (H) 0.52 - 1.04 mg/dL    GFR Estimate 50 (L) >60 mL/min/1.7m2      Comment:      Non  GFR Calc    GFR Estimate If Black 61 >60 mL/min/1.7m2      Comment:       GFR Calc    Calcium 8.9 8.5 - 10.1 mg/dL    Bilirubin Total 0.4 0.2 - 1.3 mg/dL    Albumin 3.9 3.4 - 5.0 g/dL    Protein Total 7.3 6.8 - 8.8 g/dL    Alkaline Phosphatase 145 40 - 150 U/L    ALT 29 0 - 50 U/L    AST 25 0 - 45 U/L   Iron and iron binding capacity   Result Value Ref Range    Iron 98 35 - 180 ug/dL    Iron Binding Cap 455 (H) 240 - 430 ug/dL    Iron Saturation Index 22 15 -  46 %   Ferritin   Result Value Ref Range    Ferritin 11 8 - 252 ng/mL   Vitamin B12   Result Value Ref Range    Vitamin B12 554 193 - 986 pg/mL   Vitamin D Deficiency   Result Value Ref Range    Vitamin D Deficiency screening 8 (L) 20 - 75 ug/L      Comment:      Season, race, dietary intake, and treatment affect the concentration of   25-hydroxy-Vitamin D. Values may decrease during winter months and increase   during summer months. Values 20-29 ug/L may indicate Vitamin D insufficiency   and values <20 ug/L may indicate Vitamin D deficiency.  Vitamin D determination is routinely performed by an immunoassay specific for   25 hydroxyvitamin D3.  If an individual is on vitamin D2 (ergocalciferol)   supplementation, please specify 25 OH vitamin D2 and D3 level determination by   LCMSMS test [id number].     Parathyroid Hormone Intact   Result Value Ref Range    Parathyroid Hormone Intact 94 (H) 12 - 72 pg/mL   Folate   Result Value Ref Range    Folate 7.7 >5.4 ng/mL       If you have any questions or concerns, please call the clinic at the number listed above.       Sincerely,      Lulu Lindsey, DO/am

## 2018-01-12 NOTE — PATIENT INSTRUCTIONS
We'll do the labs for your bariatric surgery today.      You need to be taking your levothyroxine medication regularly for 8 weeks before we can check those labs.  Please plan on coming back in 8 weeks for a lab only visit.    Please also restart your vitamins and make sure to take these every day.  It is very important!      Preventive Health Recommendations  Female Ages 50 - 64    Yearly exam: See your health care provider every year in order to  o Review health changes.   o Discuss preventive care.    o Review your medicines if your doctor has prescribed any.      Get a Pap test every three years (unless you have an abnormal result and your provider advises testing more often).    If you get Pap tests with HPV test, you only need to test every 5 years, unless you have an abnormal result.     You do not need a Pap test if your uterus was removed (hysterectomy) and you have not had cancer.    You should be tested each year for STDs (sexually transmitted diseases) if you're at risk.     Have a mammogram every 1 to 2 years.    Have a colonoscopy at age 50, or have a yearly FIT test (stool test). These exams screen for colon cancer.      Have a cholesterol test every 5 years, or more often if advised.    Have a diabetes test (fasting glucose) every three years. If you are at risk for diabetes, you should have this test more often.     If you are at risk for osteoporosis (brittle bone disease), think about having a bone density scan (DEXA).    Shots: Get a flu shot each year. Get a tetanus shot every 10 years.    Nutrition:     Eat at least 5 servings of fruits and vegetables each day.    Eat whole-grain bread, whole-wheat pasta and brown rice instead of white grains and rice.    Talk to your provider about Calcium and Vitamin D.     Lifestyle    Exercise at least 150 minutes a week (30 minutes a day, 5 days a week). This will help you control your weight and prevent disease.    Limit alcohol to one drink per  day.    No smoking.     Wear sunscreen to prevent skin cancer.     See your dentist every six months for an exam and cleaning.    See your eye doctor every 1 to 2 years.

## 2018-01-12 NOTE — MR AVS SNAPSHOT
After Visit Summary   1/12/2018    Jackie Rodriguez    MRN: [id number]           Patient Information     Date Of Birth          1965        Visit Information        Provider Department      1/12/2018 1:00 PM Lulu Lindsey DO [facility]        Today's Diagnoses     Encounter for screening mammogram for breast cancer    -  1    S/P bariatric surgery        Postsurgical malabsorption, not elsewhere classified (CODE)         Vitamin D deficiency        Hypothyroidism, unspecified type          Care Instructions    We'll do the labs for your bariatric surgery today.      You need to be taking your levothyroxine medication regularly for 8 weeks before we can check those labs.  Please plan on coming back in 8 weeks for a lab only visit.    Please also restart your vitamins and make sure to take these every day.  It is very important!      Preventive Health Recommendations  Female Ages 50 - 64    Yearly exam: See your health care provider every year in order to  o Review health changes.   o Discuss preventive care.    o Review your medicines if your doctor has prescribed any.      Get a Pap test every three years (unless you have an abnormal result and your provider advises testing more often).    If you get Pap tests with HPV test, you only need to test every 5 years, unless you have an abnormal result.     You do not need a Pap test if your uterus was removed (hysterectomy) and you have not had cancer.    You should be tested each year for STDs (sexually transmitted diseases) if you're at risk.     Have a mammogram every 1 to 2 years.    Have a colonoscopy at age 50, or have a yearly FIT test (stool test). These exams screen for colon cancer.      Have a cholesterol test every 5 years, or more often if advised.    Have a diabetes test (fasting glucose) every three years. If you are at risk for diabetes, you should have this test more often.     If you are at risk for osteoporosis  (brittle bone disease), think about having a bone density scan (DEXA).    Shots: Get a flu shot each year. Get a tetanus shot every 10 years.    Nutrition:     Eat at least 5 servings of fruits and vegetables each day.    Eat whole-grain bread, whole-wheat pasta and brown rice instead of white grains and rice.    Talk to your provider about Calcium and Vitamin D.     Lifestyle    Exercise at least 150 minutes a week (30 minutes a day, 5 days a week). This will help you control your weight and prevent disease.    Limit alcohol to one drink per day.    No smoking.     Wear sunscreen to prevent skin cancer.     See your dentist every six months for an exam and cleaning.    See your eye doctor every 1 to 2 years.            Follow-ups after your visit        Future tests that were ordered for you today     Open Future Orders        Priority Expected Expires Ordered    TSH Routine 3/12/2018 1/12/2019 1/12/2018    MA Screening Digital Bilateral Routine  1/12/2019 1/12/2018            Who to contact     Normal or non-critical lab and imaging results will be communicated to you by Moxsie, letter or phone within 4 business days after the clinic has received the results. If you do not hear from us within 7 days, please contact the clinic through Moxsie or phone. If you have a critical or abnormal lab result, we will notify you by phone as soon as possible.  Submit refill requests through Moxsie or call your pharmacy and they will forward the refill request to us. Please allow 3 business days for your refill to be completed.          If you need to speak with a  for additional information , please call: [phone]           Additional Information About Your Visit        Moxsie Information     Moxsie gives you secure access to your electronic health record. If you see a primary care provider, you can also send messages to your care team and make appointments. If you have questions, please call your  "primary care clinic.  If you do not have a primary care provider, please call [phone] and they will assist you.        Care EveryWhere ID     This is your Care EveryWhere ID. This could be used by other organizations to access your [facility] medical records  [id number]        Your Vitals Were     Pulse Temperature Height Breastfeeding? BMI (Body Mass Index)       84 98.8  F (37.1  C) (Tympanic) 5' 1.5\" (1.562 m) No 18.7 kg/m2        Blood Pressure from Last 3 Encounters:   01/12/18 114/70   10/21/17 111/62   04/17/17 96/64    Weight from Last 3 Encounters:   01/12/18 100 lb 9.6 oz (45.6 kg)   04/17/17 99 lb 3.2 oz (45 kg)   12/07/16 96 lb (43.5 kg)              We Performed the Following     CBC with platelets     Comprehensive metabolic panel     Ferritin     Folate     Iron and iron binding capacity     Parathyroid Hormone Intact     Vitamin B12     Vitamin D Deficiency          Today's Medication Changes          These changes are accurate as of: 1/12/18  2:16 PM.  If you have any questions, ask your nurse or doctor.               These medicines have changed or have updated prescriptions.        Dose/Directions    fluticasone 50 MCG/ACT spray   Commonly known as:  FLONASE   This may have changed:    - when to take this  - reasons to take this   Used for:  Seasonal allergic rhinitis        Dose:  1-2 spray   Spray 1-2 sprays into both nostrils daily   Quantity:  1 Bottle   Refills:  11       omeprazole 20 MG tablet   This may have changed:    - when to take this  - reasons to take this   Used for:  Esophageal reflux        Dose:  20 mg   Take 1 tablet (20 mg) by mouth daily   Quantity:  90 tablet   Refills:  0         Stop taking these medicines if you haven't already. Please contact your care team if you have questions.     gabapentin 100 MG capsule   Commonly known as:  NEURONTIN   Stopped by:  Lulu Lindsey DO           vitamin D 2000 UNITS Caps   Stopped by:  Lulu Lindsey DO                    " Primary Care Provider Office Phone # Fax #    Lulu Lindsey,  [phone] [address] [place] [address]        Equal Access to Services     ADI VILLEGAS : Hadii aad ku hadsunshinemontserrat Jaynedignaali, waaxda luqadaha, qaybta kaalmada scarlet, margarito clark averydylan fowler sukhwinder durand. So [facility] [phone].    ATENCIÓN: Si habla español, tiene a chavez disposición servicios gratuitos de asistencia lingüística. Llame al [phone].    We comply with applicable federal civil rights laws and [place] laws. We do not discriminate on the basis of race, color, national origin, age, disability, sex, sexual orientation, or gender identity.            Thank you!     Thank you for choosing [facility]  for your care. Our goal is always to provide you with excellent care. Hearing back from our patients is one way we can continue to improve our services. Please take a few minutes to complete the written survey that you may receive in the mail after your visit with us. Thank you!             Your Updated Medication List - Protect others around you: Learn how to safely use, store and throw away your medicines at www.disposemymeds.org.          This list is accurate as of: 1/12/18  2:16 PM.  Always use your most recent med list.                   Brand Name Dispense Instructions for use Diagnosis    fluticasone 50 MCG/ACT spray    FLONASE    1 Bottle    Spray 1-2 sprays into both nostrils daily    Seasonal allergic rhinitis       levothyroxine 88 MCG tablet    SYNTHROID/LEVOTHROID    30 tablet    TAKE 1 TABLET BY MOUTH EVERY DAY    Hypothyroidism, unspecified type       omeprazole 20 MG tablet     90 tablet    Take 1 tablet (20 mg) by mouth daily    Esophageal reflux       tiZANidine 2 MG tablet    ZANAFLEX     Take 1 mg by mouth once as needed for other (for headache)        TOPAMAX 100 MG tablet   Generic drug:  topiramate      Take 100 mg by mouth daily        traMADol 50 MG tablet    ULTRAM     28 tablet    TAKE 1 TABLET BY MOUTH EVERY 6 HOURS AS NEEDED    Cervical spinal stenosis       zolpidem 10 MG tablet    AMBIEN    30 tablet    TAKE 1 TABLET BY MOUTH EVERY NIGHT AT BEDTIME AS NEEDED FOR SLEEP. DO NOT DRIVE WITHIN 6 HOURS OF MOST RECENT DOSE    Persistent disorder of initiating or maintaining sleep

## 2018-01-12 NOTE — NURSING NOTE
"Initial /70  Pulse 84  Temp 98.8  F (37.1  C) (Tympanic)  Ht 5' 1.5\" (1.562 m)  Wt 100 lb 9.6 oz (45.6 kg)  Breastfeeding? No  BMI 18.7 kg/m2 Estimated body mass index is 18.7 kg/(m^2) as calculated from the following:    Height as of this encounter: 5' 1.5\" (1.562 m).    Weight as of this encounter: 100 lb 9.6 oz (45.6 kg). .      "
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
## 2018-01-12 NOTE — PROGRESS NOTES

## 2018-01-13 LAB
ALBUMIN SERPL-MCNC: 3.9 G/DL (ref 3.4–5)
ALP SERPL-CCNC: 145 U/L (ref 40–150)
ALT SERPL W P-5'-P-CCNC: 29 U/L (ref 0–50)
ANION GAP SERPL CALCULATED.3IONS-SCNC: 4 MMOL/L (ref 3–14)
AST SERPL W P-5'-P-CCNC: 25 U/L (ref 0–45)
BILIRUB SERPL-MCNC: 0.4 MG/DL (ref 0.2–1.3)
BUN SERPL-MCNC: 13 MG/DL (ref 7–30)
CALCIUM SERPL-MCNC: 8.9 MG/DL (ref 8.5–10.1)
CHLORIDE SERPL-SCNC: 109 MMOL/L (ref 94–109)
CO2 SERPL-SCNC: 28 MMOL/L (ref 20–32)
CREAT SERPL-MCNC: 1.13 MG/DL (ref 0.52–1.04)
FERRITIN SERPL-MCNC: 11 NG/ML (ref 8–252)
FOLATE SERPL-MCNC: 7.7 NG/ML
GFR SERPL CREATININE-BSD FRML MDRD: 50 ML/MIN/1.7M2
GLUCOSE SERPL-MCNC: 114 MG/DL (ref 70–99)
IRON SATN MFR SERPL: 22 % (ref 15–46)
IRON SERPL-MCNC: 98 UG/DL (ref 35–180)
POTASSIUM SERPL-SCNC: 3.5 MMOL/L (ref 3.4–5.3)
PROT SERPL-MCNC: 7.3 G/DL (ref 6.8–8.8)
PTH-INTACT SERPL-MCNC: 94 PG/ML (ref 12–72)
SODIUM SERPL-SCNC: 141 MMOL/L (ref 133–144)
TIBC SERPL-MCNC: 455 UG/DL (ref 240–430)
VIT B12 SERPL-MCNC: 554 PG/ML (ref 193–986)

## 2018-01-15 LAB — DEPRECATED CALCIDIOL+CALCIFEROL SERPL-MC: 8 UG/L (ref 20–75)

## 2018-01-23 ENCOUNTER — TELEPHONE (OUTPATIENT)
Dept: FAMILY MEDICINE | Facility: CLINIC | Age: 53
End: 2018-01-23

## 2018-01-23 NOTE — TELEPHONE ENCOUNTER
Received multiple faxes from [facility] (pharmacy?)    Medication not on med list, refused by provider    Noted and faxed back.    Scanned to this encounter.

## 2018-03-01 DIAGNOSIS — E03.9 HYPOTHYROIDISM, UNSPECIFIED TYPE: ICD-10-CM

## 2018-03-01 DIAGNOSIS — G47.00 PERSISTENT DISORDER OF INITIATING OR MAINTAINING SLEEP: ICD-10-CM

## 2018-03-01 RX ORDER — ZOLPIDEM TARTRATE 10 MG/1
TABLET ORAL
Qty: 30 TABLET | Refills: 0 | Status: SHIPPED | OUTPATIENT
Start: 2018-03-01 | End: 2019-01-16

## 2018-03-01 RX ORDER — LEVOTHYROXINE SODIUM 88 UG/1
TABLET ORAL
Qty: 30 TABLET | Refills: 0 | Status: SHIPPED | OUTPATIENT
Start: 2018-03-01 | End: 2018-05-22

## 2018-03-01 NOTE — TELEPHONE ENCOUNTER
Pt's last TSH was 21.7% on 4/17/17.  Was instructed on 1/12/2018: 'You need to be taking your levothyroxine medication regularly for 8 weeks before we can check those labs.    Please plan on coming back in 8 weeks for a lab only visit.'  No appt has been scheduled.    Routing to Dr. Lindsey.    Ange CORTEZ RN

## 2018-03-01 NOTE — TELEPHONE ENCOUNTER
"Zolpidem 10mg      Last Written Prescription Date:  12/28/2017 #30 x 0  Last filled 12/29/2017  Last Office Visit: 01/12/2018 ANEESH Lindsey  Future Office visit:   none    Routing refill request to provider for review/approval because:  Drug not on the [facility], [facility] or [facility] refill protocol or controlled substance    Requested Prescriptions   Pending Prescriptions Disp Refills                       levothyroxine (SYNTHROID/LEVOTHROID) 88 MCG tablet [Pharmacy Med Name: LEVOTHYROXINE 0.088MG (88MCG) TAB] 90 tablet 0    Last Written Prescription Date:  12/28/2017 #30 x 0  Last filled 12/28/2017  Last office visit: 1/12/2018 ANEESH Lindsey  Future Office Visit: None   Sig: TAKE 1 TABLET BY MOUTH EVERY DAY    Thyroid Protocol Failed    3/1/2018  2:17 PM       Failed - Normal TSH on file in past 12 months    Recent Labs   Lab Test  04/17/17   1411   TSH  21.70*             Passed - Patient is 12 years or older       Passed - Recent or future visit with authorizing provider's specialty    Patient had office visit in the last year or has a visit in the next 30 days with authorizing provider.  See \"Patient Info\" tab in inbasket, or \"Choose Columns\" in Meds & Orders section of the refill encounter.            Passed - No active pregnancy on record    If patient is pregnant or has had a positive pregnancy test, please check TSH.         Passed - No positive pregnancy test in past 12 months    If patient is pregnant or has had a positive pregnancy test, please check TSH.            "

## 2018-03-01 NOTE — TELEPHONE ENCOUNTER
Please call pt.  I only filled 30 days of levothyroxine because she is due for lab in 2 weeks.  Will fill 90 days once TSH is at goal.    Lulu Lindsey

## 2018-05-22 DIAGNOSIS — E03.9 HYPOTHYROIDISM, UNSPECIFIED TYPE: ICD-10-CM

## 2018-05-22 NOTE — TELEPHONE ENCOUNTER
"Requested Prescriptions   Pending Prescriptions Disp Refills     levothyroxine (SYNTHROID/LEVOTHROID) 88 MCG tablet [Pharmacy Med Name: LEVOTHYROXINE 0.088MG (88MCG) TAB] 30 tablet 0    Last Written Prescription Date:  03/01/2018 #30 x 0  Last filled 03/01/2018  Last office visit: 1/12/2018 ANEESH Lindsey   Future Office Visit:  None   Sig: TAKE 1 TABLET BY MOUTH EVERY DAY    Thyroid Protocol Failed    5/22/2018  4:09 AM       Failed - Normal TSH on file in past 12 months    Recent Labs   Lab Test  04/17/17   1411   TSH  21.70*             Passed - Patient is 12 years or older       Passed - Recent (12 mo) or future (30 days) visit within the authorizing provider's specialty    Patient had office visit in the last 12 months or has a visit in the next 30 days with authorizing provider or within the authorizing provider's specialty.  See \"Patient Info\" tab in inbasket, or \"Choose Columns\" in Meds & Orders section of the refill encounter.           Passed - No active pregnancy on record    If patient is pregnant or has had a positive pregnancy test, please check TSH.         Passed - No positive pregnancy test in past 12 months    If patient is pregnant or has had a positive pregnancy test, please check TSH.            "

## 2018-05-24 RX ORDER — LEVOTHYROXINE SODIUM 88 UG/1
TABLET ORAL
Qty: 30 TABLET | Refills: 0 | Status: SHIPPED | OUTPATIENT
Start: 2018-05-24 | End: 2018-06-20

## 2018-05-24 NOTE — TELEPHONE ENCOUNTER
Filled for 1 month.  Please call pt.  She needs her thyroid labs done before next refill and before running out of medication.    Lulu Lindsey

## 2018-05-24 NOTE — TELEPHONE ENCOUNTER
Routing refill request to provider for review/approval because:  Abnormal labs  Genesis Khalil RN

## 2018-06-18 ENCOUNTER — OFFICE VISIT (OUTPATIENT)
Dept: FAMILY MEDICINE | Facility: CLINIC | Age: 53
End: 2018-06-18
Payer: MEDICARE

## 2018-06-18 VITALS
HEIGHT: 62 IN | SYSTOLIC BLOOD PRESSURE: 102 MMHG | BODY MASS INDEX: 19.1 KG/M2 | TEMPERATURE: 97.3 F | DIASTOLIC BLOOD PRESSURE: 74 MMHG | WEIGHT: 103.8 LBS | HEART RATE: 78 BPM

## 2018-06-18 DIAGNOSIS — N89.8 VAGINAL DISCHARGE: Primary | ICD-10-CM

## 2018-06-18 LAB
SPECIMEN SOURCE: NORMAL
WET PREP SPEC: NORMAL

## 2018-06-18 PROCEDURE — 87491 CHLMYD TRACH DNA AMP PROBE: CPT | Performed by: NURSE PRACTITIONER

## 2018-06-18 PROCEDURE — 87210 SMEAR WET MOUNT SALINE/INK: CPT | Performed by: NURSE PRACTITIONER

## 2018-06-18 PROCEDURE — 87591 N.GONORRHOEAE DNA AMP PROB: CPT | Performed by: NURSE PRACTITIONER

## 2018-06-18 PROCEDURE — 99213 OFFICE O/P EST LOW 20 MIN: CPT | Performed by: NURSE PRACTITIONER

## 2018-06-18 NOTE — MR AVS SNAPSHOT
"              After Visit Summary   6/18/2018    Jackie Rodriguez    MRN: [id number]           Patient Information     Date Of Birth          1965        Visit Information        Provider Department      6/18/2018 3:20 PM Xiao Young APRN [facility]        Today's Diagnoses     Vaginal discharge    -  1      Care Instructions    Your wet prep was negative,   The rest of the tests will be back tomorrow.    You will get a [employer] message tomorrow once the results come back           Follow-ups after your visit        Who to contact     Normal or non-critical lab and imaging results will be communicated to you by mytraxhart, letter or phone within 4 business days after the clinic has received the results. If you do not hear from us within 7 days, please contact the clinic through [employer] or phone. If you have a critical or abnormal lab result, we will notify you by phone as soon as possible.  Submit refill requests through [employer] or call your pharmacy and they will forward the refill request to us. Please allow 3 business days for your refill to be completed.          If you need to speak with a  for additional information , please call: [phone]           Additional Information About Your Visit        [employer] Information     [employer] gives you secure access to your electronic health record. If you see a primary care provider, you can also send messages to your care team and make appointments. If you have questions, please call your primary care clinic.  If you do not have a primary care provider, please call [phone] and they will assist you.        Care EveryWhere ID     This is your Care EveryWhere ID. This could be used by other organizations to access your [facility] medical records  [id number]        Your Vitals Were     Pulse Temperature Height BMI (Body Mass Index)          78 97.3  F (36.3  C) (Tympanic) 5' 1.75\" (1.568 m) 19.14 kg/m2         Blood " Pressure from Last 3 Encounters:   06/18/18 102/74   01/12/18 114/70   10/21/17 111/62    Weight from Last 3 Encounters:   06/18/18 103 lb 12.8 oz (47.1 kg)   01/12/18 100 lb 9.6 oz (45.6 kg)   04/17/17 99 lb 3.2 oz (45 kg)              We Performed the Following     Chlamydia trachomatis PCR     Neisseria gonorrhoeae PCR     Wet prep          Today's Medication Changes          These changes are accurate as of 6/18/18  4:22 PM.  If you have any questions, ask your nurse or doctor.               These medicines have changed or have updated prescriptions.        Dose/Directions    fluticasone 50 MCG/ACT spray   Commonly known as:  FLONASE   This may have changed:    - when to take this  - reasons to take this   Used for:  Seasonal allergic rhinitis        Dose:  1-2 spray   Spray 1-2 sprays into both nostrils daily   Quantity:  1 Bottle   Refills:  11       omeprazole 20 MG tablet   This may have changed:    - when to take this  - reasons to take this   Used for:  Esophageal reflux        Dose:  20 mg   Take 1 tablet (20 mg) by mouth daily   Quantity:  90 tablet   Refills:  0                Primary Care Provider Office Phone # Fax #    Lulu Stevenson DO César [phone] [address] [place] [address]        Equal Access to Services     ADI VILLEGAS AH: Gio schumachero Sotulio, waaxda luqadaha, qaybta kaalmada adeegyada, margarito durand. So [facility] [phone].    ATENCIÓN: Si habla español, tiene a chavez disposición servicios gratuitos de asistencia lingüística. Llame al [phone].    We comply with applicable federal civil rights laws and [place] laws. We do not discriminate on the basis of race, color, national origin, age, disability, sex, sexual orientation, or gender identity.            Thank you!     Thank you for choosing [facility] [place]  for your care. Our goal is always to provide you with excellent care. Hearing back from our patients is one  way we can continue to improve our services. Please take a few minutes to complete the written survey that you may receive in the mail after your visit with us. Thank you!             Your Updated Medication List - Protect others around you: Learn how to safely use, store and throw away your medicines at www.disposemymeds.org.          This list is accurate as of 6/18/18  4:22 PM.  Always use your most recent med list.                   Brand Name Dispense Instructions for use Diagnosis    fluticasone 50 MCG/ACT spray    FLONASE    1 Bottle    Spray 1-2 sprays into both nostrils daily    Seasonal allergic rhinitis       levothyroxine 88 MCG tablet    SYNTHROID/LEVOTHROID    30 tablet    TAKE 1 TABLET BY MOUTH EVERY DAY    Hypothyroidism, unspecified type       omeprazole 20 MG tablet     90 tablet    Take 1 tablet (20 mg) by mouth daily    Esophageal reflux       tiZANidine 2 MG tablet    ZANAFLEX     Take 1 mg by mouth once as needed for other (for headache)        TOPAMAX 100 MG tablet   Generic drug:  topiramate      Take 100 mg by mouth daily        traMADol 50 MG tablet    ULTRAM    28 tablet    TAKE 1 TABLET BY MOUTH EVERY 6 HOURS AS NEEDED    Cervical spinal stenosis       zolpidem 10 MG tablet    AMBIEN    30 tablet    TAKE 1 TABLET BY MOUTH EVERY NIGHT AT BEDTIME AS NEEDED FOR SLEEP    Persistent disorder of initiating or maintaining sleep

## 2018-06-18 NOTE — PROGRESS NOTES
SUBJECTIVE:   Jackie Rodriguez is a 53 year old female who presents to clinic today for the following health issues:    Vaginal Symptoms  Onset: 2 weeks ago    Description:  Vaginal Discharge: creamy green   Itching (Pruritis): no   Burning sensation:  no   Odor: YES- little    Accompanying Signs & Symptoms:  Pain with Urination: no   Abdominal Pain: no   Fever: no     History:   Sexually active: YES  New Partner: YES  Possibility of Pregnancy:  No    Precipitating factors:   Recent Antibiotic Use: no     Alleviating factors:  No known    Therapies Tried and outcome: none    No itching   Sexual partner.   First one over the last  3 years after breaking up with her .      Problem list and histories reviewed & adjusted, as indicated.  Additional history: as documented    Patient Active Problem List   Diagnosis     Hypothyroidism     Persistent disorder of initiating or maintaining sleep     Exposure to Hep C     Bilateral ulnar neuropathy - cubital tunnel s/p b/l transpositions     Chronic b/l wirst/elbow pain     CARDIOVASCULAR SCREENING; LDL GOAL LESS THAN 160     History of drug abuse meth sober since 2007     Upper back strain     Enlarged lymph node  per mammo.   left axilla     Bilateral arm weakness     Headache     LINA (obstructive sleep apnea)     24 hour contact given to patient     Screening for diabetes mellitus     Finger fracture, right little finger     Vitamin D deficiency     T wave inversion in EKG     Cervical spinal stenosis C5-6, C6-7 discs and foraminal stenosis     Health Care Home     Dyspareunia     Situational depression     S/P bariatric surgery     Postsurgical malabsorption, not elsewhere classified      Past Surgical History:   Procedure Laterality Date     BARIATRIC SURGERY  3/6/2012    lola-en-y     carple tunnel      bilat      COLONOSCOPY N/A 12/7/2016    Procedure: COLONOSCOPY;  Surgeon: Frankie Gregorio MD;  Location: [facility]     HYSTERECTOMY, PAP NO LONGER INDICATED   1986    has left ovary     INJECT EPIDURAL CERVICAL  10/8/2012    Procedure: INJECT EPIDURAL CERVICAL;  ROMINA Cervical--;  Surgeon: Provider, Generic Anesthesia;  Location: [facility]     LAPAROSCOPIC SALPINGO-OOPHORECTOMY  10/21/2013    Procedure: LAPAROSCOPIC SALPINGO-OOPHORECTOMY;  Laparoscopic Left Salpingo-Oophorectomy;  Surgeon: Willie Leblanc MD;  Location: [facility]     TRANSPOSITION ULNAR NERVE (ELBOW)      right  x 2  left elbow x1       Social History   Substance Use Topics     Smoking status: Former Smoker     Packs/day: 0.50     Quit date: 6/28/2008     Smokeless tobacco: Never Used     Alcohol use No     Family History   Problem Relation Age of Onset     Arthritis Mother      Obesity Mother      Thyroid Disease Mother      Neurologic Disorder Mother      migraine     Arthritis Father      CANCER Father      lung ca     Thyroid Disease Brother      Neurologic Disorder Brother      migraine     Thyroid Disease Sister      Thyroid Disease Sister      HEART DISEASE Sister      Obesity Sister      Psychotic Disorder Sister      Neurologic Disorder Sister      migraines     Obesity Sister      Neurologic Disorder Daughter      migraines         Current Outpatient Prescriptions   Medication Sig Dispense Refill     fluticasone (FLONASE) 50 MCG/ACT nasal spray Spray 1-2 sprays into both nostrils daily (Patient taking differently: Spray 1-2 sprays into both nostrils daily as needed ) 1 Bottle 11     levothyroxine (SYNTHROID/LEVOTHROID) 88 MCG tablet TAKE 1 TABLET BY MOUTH EVERY DAY 30 tablet 0     omeprazole 20 MG tablet Take 1 tablet (20 mg) by mouth daily (Patient taking differently: Take 20 mg by mouth daily as needed ) 90 tablet 0     tiZANidine (ZANAFLEX) 2 MG tablet Take 1 mg by mouth once as needed for other (for headache)       topiramate (TOPAMAX) 100 MG tablet Take 100 mg by mouth daily       traMADol (ULTRAM) 50 MG tablet TAKE 1 TABLET BY MOUTH EVERY 6 HOURS AS NEEDED 28 tablet 0     zolpidem  "(AMBIEN) 10 MG tablet TAKE 1 TABLET BY MOUTH EVERY NIGHT AT BEDTIME AS NEEDED FOR SLEEP 30 tablet 0     Allergies   Allergen Reactions     Nkda [No Known Drug Allergies]      BP Readings from Last 3 Encounters:   06/18/18 102/74   01/12/18 114/70   10/21/17 111/62    Wt Readings from Last 3 Encounters:   06/18/18 103 lb 12.8 oz (47.1 kg)   01/12/18 100 lb 9.6 oz (45.6 kg)   04/17/17 99 lb 3.2 oz (45 kg)                    Reviewed and updated as needed this visit by clinical staff  Tobacco  Allergies  Meds  Problems  Med Hx  Surg Hx  Fam Hx  Soc Hx        Reviewed and updated as needed this visit by Provider  Meds  Problems         ROS:  CONSTITUTIONAL: NEGATIVE for fever, chills, change in weight  RESP: NEGATIVE for significant cough or SOB  CV: NEGATIVE for chest pain, palpitations or peripheral edema  : vaginal discharge thick green vaginal discharge mild irritation and did have a hysterectomy     OBJECTIVE:     /74  Pulse 78  Temp 97.3  F (36.3  C) (Tympanic)  Ht 5' 1.75\" (1.568 m)  Wt 103 lb 12.8 oz (47.1 kg)  BMI 19.14 kg/m2  Body mass index is 19.14 kg/(m^2).   GENERAL: healthy, alert and no distress   (female): normal female external genitalia, normal urethral meatus , vaginal mucosa pink, moist. POSITIVE vaginal discharge - moderate, white, yellow, watery and malodorous. No cervical motion tenderness.  MS: no gross musculoskeletal defects noted, no edema    Diagnostic Test Results:  Results for orders placed or performed in visit on 06/18/18 (from the past 24 hour(s))   Wet prep   Result Value Ref Range    Specimen Description Vagina     Wet Prep No clue cells seen     Wet Prep No yeast seen     Wet Prep No Trichomonas seen        ASSESSMENT/PLAN:     ASSESSMENT/PLAN:      ICD-10-CM    1. Vaginal discharge N89.8 Wet prep     Neisseria gonorrhoeae PCR     Chlamydia trachomatis PCR       Patient Instructions   Your wet prep was negative,   The rest of the tests will be back tomorrow.   "  You will get a Br[employer] message tomorrow once the results come back                  See Patient Instructions    REJI POSADAS NP, APRN [facility]

## 2018-06-18 NOTE — NURSING NOTE
"Initial There were no vitals taken for this visit. Estimated body mass index is 18.7 kg/(m^2) as calculated from the following:    Height as of 1/12/18: 5' 1.5\" (1.562 m).    Weight as of 1/12/18: 100 lb 9.6 oz (45.6 kg). .      "

## 2018-06-19 LAB
C TRACH DNA SPEC QL NAA+PROBE: NEGATIVE
N GONORRHOEA DNA SPEC QL NAA+PROBE: NEGATIVE
SPECIMEN SOURCE: NORMAL
SPECIMEN SOURCE: NORMAL

## 2018-06-20 DIAGNOSIS — E03.9 HYPOTHYROIDISM, UNSPECIFIED TYPE: ICD-10-CM

## 2018-06-20 NOTE — TELEPHONE ENCOUNTER
"Requested Prescriptions   Pending Prescriptions Disp Refills     levothyroxine (SYNTHROID/LEVOTHROID) 88 MCG tablet [Pharmacy Med Name: LEVOTHYROXINE 0.088MG (88MCG) TAB] 30 tablet 0    Last Written Prescription Date:  5/24/18  Last Fill Quantity: 30,  # refills: 0   Last office visit: 6/18/2018 with prescribing provider:  6/18/18 lawrence   Future Office Visit:     Sig: TAKE 1 TABLET BY MOUTH EVERY DAY    Thyroid Protocol Failed    6/20/2018  4:17 AM       Failed - Normal TSH on file in past 12 months    Recent Labs   Lab Test  04/17/17   1411   TSH  21.70*             Passed - Patient is 12 years or older       Passed - Recent (12 mo) or future (30 days) visit within the authorizing provider's specialty    Patient had office visit in the last 12 months or has a visit in the next 30 days with authorizing provider or within the authorizing provider's specialty.  See \"Patient Info\" tab in inbasket, or \"Choose Columns\" in Meds & Orders section of the refill encounter.           Passed - No active pregnancy on record    If patient is pregnant or has had a positive pregnancy test, please check TSH.         Passed - No positive pregnancy test in past 12 months    If patient is pregnant or has had a positive pregnancy test, please check TSH.            "

## 2018-06-20 NOTE — TELEPHONE ENCOUNTER
Routing refill request to provider for review/approval because:  Naomi given x1 and patient did not follow up. Patient instructed to have thyroid labs done before next refill and has not yet done so.    Camryn Miller RN

## 2018-06-21 RX ORDER — LEVOTHYROXINE SODIUM 88 UG/1
TABLET ORAL
Qty: 30 TABLET | Refills: 0 | Status: SHIPPED | OUTPATIENT
Start: 2018-06-21 | End: 2018-07-05

## 2018-06-21 NOTE — TELEPHONE ENCOUNTER
Filled for additional month.  Pt needs to take med consistently prior to blood work.  Please call her and schedule lab visit for TSH  Lulu Lindsey

## 2018-07-03 DIAGNOSIS — E03.9 HYPOTHYROIDISM, UNSPECIFIED TYPE: ICD-10-CM

## 2018-07-03 DIAGNOSIS — E03.9 HYPOTHYROIDISM: Primary | ICD-10-CM

## 2018-07-03 LAB — TSH SERPL DL<=0.005 MIU/L-ACNC: 75.16 MU/L (ref 0.4–4)

## 2018-07-03 PROCEDURE — 36415 COLL VENOUS BLD VENIPUNCTURE: CPT | Performed by: FAMILY MEDICINE

## 2018-07-03 PROCEDURE — 84443 ASSAY THYROID STIM HORMONE: CPT | Performed by: FAMILY MEDICINE

## 2018-07-05 DIAGNOSIS — E03.9 HYPOTHYROIDISM, UNSPECIFIED TYPE: ICD-10-CM

## 2018-07-05 RX ORDER — LEVOTHYROXINE SODIUM 100 UG/1
100 TABLET ORAL DAILY
Qty: 30 TABLET | Refills: 1 | Status: SHIPPED | OUTPATIENT
Start: 2018-07-05 | End: 2019-05-16

## 2018-07-19 ENCOUNTER — TELEPHONE (OUTPATIENT)
Dept: FAMILY MEDICINE | Facility: CLINIC | Age: 53
End: 2018-07-19

## 2018-07-19 DIAGNOSIS — E03.9 HYPOTHYROIDISM, UNSPECIFIED TYPE: ICD-10-CM

## 2018-07-19 RX ORDER — LEVOTHYROXINE SODIUM 88 UG/1
TABLET ORAL
Qty: 30 TABLET | Refills: 0 | OUTPATIENT
Start: 2018-07-19

## 2018-07-19 NOTE — TELEPHONE ENCOUNTER
"Requested Prescriptions   Pending Prescriptions Disp Refills     levothyroxine (SYNTHROID/LEVOTHROID) 88 MCG tablet [Pharmacy Med Name: LEVOTHYROXINE 0.088MG (88MCG) TAB] 30 tablet 0    Last Written Prescription Date:  07/05/2018 #30 x 1  Last filled 06/21/2018  Last office visit: 6/18/2018 ANEESH Young   Future Office Visit:  None   Sig: TAKE 1 TABLET BY MOUTH EVERY DAY    Thyroid Protocol Failed    7/19/2018  4:09 AM       Failed - Normal TSH on file in past 12 months    Recent Labs   Lab Test  07/03/18   1353   TSH  75.16*             Passed - Patient is 12 years or older       Passed - Recent (12 mo) or future (30 days) visit within the authorizing provider's specialty    Patient had office visit in the last 12 months or has a visit in the next 30 days with authorizing provider or within the authorizing provider's specialty.  See \"Patient Info\" tab in inbasket, or \"Choose Columns\" in Meds & Orders section of the refill encounter.           Passed - No active pregnancy on record    If patient is pregnant or has had a positive pregnancy test, please check TSH.         Passed - No positive pregnancy test in past 12 months    If patient is pregnant or has had a positive pregnancy test, please check TSH.            "

## 2018-07-19 NOTE — TELEPHONE ENCOUNTER
Routing refill request to provider for review/approval because:  Labs abnormal Genesis Khalil RN

## 2018-07-20 NOTE — TELEPHONE ENCOUNTER
Pt should have had a new script for 100mcg sent by Randi in early July.  She should take that daily for 8 weeks then plan a recheck of her TSH.  Lab order is available.    Lulu Lindsey

## 2018-08-03 NOTE — TELEPHONE ENCOUNTER
"Spoke with pt, says she did not request the 88mcg dose, she has been taking the 100mcg as directed.  Has missed a \"couple of pills, a couple of times a week.\"  Says she has had a lot of \"chaos\" but her granddaughter will be moving out of her house soon.  Discussed getting back on track with taking daily and come in for lab only appt.  \"I know that.\"    Ange CORTEZ RN      "

## 2018-11-05 ENCOUNTER — TRANSFERRED RECORDS (OUTPATIENT)
Dept: HEALTH INFORMATION MANAGEMENT | Facility: CLINIC | Age: 53
End: 2018-11-05

## 2018-11-06 NOTE — TELEPHONE ENCOUNTER
Received a fax from an unlisted pharmacy    Noted on fax refused and faxed back.    Brigido Linton RT (r)  [facility]

## 2018-11-13 ENCOUNTER — TRANSFERRED RECORDS (OUTPATIENT)
Dept: HEALTH INFORMATION MANAGEMENT | Facility: CLINIC | Age: 53
End: 2018-11-13

## 2018-11-20 ENCOUNTER — TRANSFERRED RECORDS (OUTPATIENT)
Dept: HEALTH INFORMATION MANAGEMENT | Facility: CLINIC | Age: 53
End: 2018-11-20

## 2019-01-16 DIAGNOSIS — G47.00 PERSISTENT DISORDER OF INITIATING OR MAINTAINING SLEEP: ICD-10-CM

## 2019-01-16 DIAGNOSIS — M48.02 CERVICAL SPINAL STENOSIS: ICD-10-CM

## 2019-01-16 NOTE — TELEPHONE ENCOUNTER
Requested Prescriptions   Pending Prescriptions Disp Refills     traMADol (ULTRAM) 50 MG tablet [Pharmacy Med Name: TRAMADOL 50MG TABLETS]  Last Written Prescription Date:  12/28/17  Last Fill Quantity: 28,  # refills: 0   Last office visit: 6/18/2018 with prescribing provider:  lawrence   Future Office Visit:     28 tablet 0     Sig: TAKE 1 TABLET BY MOUTH EVERY 6 HOURS AS NEEDED    There is no refill protocol information for this order        zolpidem (AMBIEN) 10 MG tablet [Pharmacy Med Name: ZOLPIDEM 10MG TABLETS]  Last Written Prescription Date:  3/1/18  Last Fill Quantity: 30,  # refills: 0   Last office visit: 6/18/2018 with prescribing provider:  lawrence   Future Office Visit:     30 tablet 0     Sig: TAKE 1 TABLET BY MOUTH EVERY DAY AT BEDTIME AS NEEDED FOR SLEEP    There is no refill protocol information for this order

## 2019-01-17 NOTE — TELEPHONE ENCOUNTER
Routing refill request to provider for review/approval because:  Drug not on the [facility] refill protocol Genesis Khalil RN

## 2019-01-18 RX ORDER — ZOLPIDEM TARTRATE 10 MG/1
TABLET ORAL
Qty: 10 TABLET | Refills: 0 | Status: SHIPPED | OUTPATIENT
Start: 2019-01-18 | End: 2019-06-06

## 2019-01-18 RX ORDER — TRAMADOL HYDROCHLORIDE 50 MG/1
TABLET ORAL
Qty: 10 TABLET | Refills: 0 | Status: SHIPPED | OUTPATIENT
Start: 2019-01-18 | End: 2019-12-18

## 2019-01-18 NOTE — TELEPHONE ENCOUNTER
Filled for small number.  Pt needs in office follow up prior to any additional refills.    Lulu Lindsey

## 2019-05-06 ENCOUNTER — TELEPHONE (OUTPATIENT)
Dept: FAMILY MEDICINE | Facility: CLINIC | Age: 54
End: 2019-05-06

## 2019-05-06 NOTE — TELEPHONE ENCOUNTER
Patient dropped off a picture of her mouth from the dentist, the dentist says she has a mass under her right eye and wants her doctor to look at it.  Patient called dentist to see if they can put picture on disk and they cannot.  Please advise what you want patient to do from here.

## 2019-05-07 NOTE — TELEPHONE ENCOUNTER
Per Dr. Lindsey wants to check with Radiology in [place] to see if we can send the picture to them to read for her.  Left message with radiology to call on the doctor line.    Neena Freeman [facility]

## 2019-05-09 NOTE — TELEPHONE ENCOUNTER
I have left a msg for Dr Castillo to return my call to see if he would take a look at the laminated xray pic we have from the pt.     Ashleigh [place], [place]

## 2019-05-09 NOTE — TELEPHONE ENCOUNTER
Dr. Castillo returned call, ok to send picture he will look at.  Sent to [place] today 5/9/2019.    Neena Freeman, [facility]

## 2019-05-13 ENCOUNTER — TRANSFERRED RECORDS (OUTPATIENT)
Dept: HEALTH INFORMATION MANAGEMENT | Facility: CLINIC | Age: 54
End: 2019-05-13

## 2019-05-13 NOTE — TELEPHONE ENCOUNTER
Please give pt a call.  I spoke with Dr Castillo regarding her films.  He feels the area her dentist was concerned about represents a finding in her sinus, not by her eye.  This is most likely a mucus retention cyst which is a common benign finding.  He recommend no other evaluation.    Lulu Lindsey

## 2019-05-14 NOTE — TELEPHONE ENCOUNTER
Pt was notified of notes below, she wants to talk to Dr shaffer to see if there is anything she can or should be doing for it?     Picture was put up at the  for

## 2019-05-15 NOTE — TELEPHONE ENCOUNTER
Please let Jackie know that I am not in clinic on Wednesday.  There should not be anything that she needs to do about this.  These cysts are relatively common incidental findings.  I would not expect it to be causing her any symptoms or trouble.      Lulu Lindsey

## 2019-05-16 DIAGNOSIS — E03.9 HYPOTHYROIDISM, UNSPECIFIED TYPE: ICD-10-CM

## 2019-05-16 NOTE — TELEPHONE ENCOUNTER
"Requested Prescriptions   Pending Prescriptions Disp Refills     levothyroxine (SYNTHROID/LEVOTHROID) 100 MCG tablet [Pharmacy Med Name: LEVOTHYROXINE 0.100MG (100MCG) TAB]  Last Written Prescription Date:  07/05/2018 #30 x 1  Last filled 01/16/2019  Last office visit: 6/18/2018 ANEESH Young   Future Office Visit:  None   30 tablet 0     Sig: TAKE 1 TABLET(100 MCG) BY MOUTH DAILY       Thyroid Protocol Failed - 5/16/2019 10:41 AM        Failed - Normal TSH on file in past 12 months     Recent Labs   Lab Test 07/03/18  1353   TSH 75.16*              Passed - Patient is 12 years or older        Passed - Recent (12 mo) or future (30 days) visit within the authorizing provider's specialty     Patient had office visit in the last 12 months or has a visit in the next 30 days with authorizing provider or within the authorizing provider's specialty.  See \"Patient Info\" tab in inbasket, or \"Choose Columns\" in Meds & Orders section of the refill encounter.              Passed - Medication is active on med list        Passed - No active pregnancy on record     If patient is pregnant or has had a positive pregnancy test, please check TSH.          Passed - No positive pregnancy test in past 12 months     If patient is pregnant or has had a positive pregnancy test, please check TSH.            "

## 2019-05-17 NOTE — TELEPHONE ENCOUNTER
Routing refill request to provider for review/approval because:  Labs out of range:    Shanna Vega RN

## 2019-05-17 NOTE — TELEPHONE ENCOUNTER
Please call pt.  This script has not bene filled in sometime and she never followed up for labs.  Has she been taking this?  What's going on with her thyroid management?    Lulu Lindsey     Detail Level: Simple Detail Level: Generalized Detail Level: Zone Include Location In Plan?: Yes

## 2019-05-21 RX ORDER — LEVOTHYROXINE SODIUM 100 UG/1
TABLET ORAL
Qty: 60 TABLET | Refills: 0 | Status: SHIPPED | OUTPATIENT
Start: 2019-05-21 | End: 2020-08-06

## 2019-05-21 NOTE — TELEPHONE ENCOUNTER
She actually needs to restart the med and be on it for 6-8 weeks prior to labs.  Script for 2 month supply sent    Lulu Lindsey

## 2019-05-21 NOTE — TELEPHONE ENCOUNTER
"Call placed to patient.  Patient states she wants to \"get back on track\".  Explained need to get labs to determine what dose she needs as she has been off the Levothyroxine for some time.  Scheduled lab appointment and follow up visit with Dr Lindsey.  TSH Free T4 is ordered, did not see any labs due per HM.  Shanna Vega RN       "

## 2019-05-24 ENCOUNTER — TELEPHONE (OUTPATIENT)
Dept: FAMILY MEDICINE | Facility: CLINIC | Age: 54
End: 2019-05-24

## 2019-05-24 NOTE — TELEPHONE ENCOUNTER
Panel Management Review      Patient has the following on her problem list:     Depression / Dysthymia review    Measure:  Needs PHQ-9 score of 4 or less during index window.  Administer PHQ-9 and if score is 5 or more, send encounter to provider for next steps.    5 - 7 month window range: 12/8/16-2/8/16    PHQ-9 SCORE 3/21/2016 3/24/2016 7/8/2016   PHQ-9 Total Score - - -   PHQ-9 Total Score MyChart 9 (Mild depression) - -   PHQ-9 Total Score - 9 5       If PHQ-9 recheck is 5 or more, route to provider for next steps.    Patient is due for:  PHQ9      Composite cancer screening  Chart review shows that this patient is due/due soon for the following Mammogram  Summary:    Patient is due/failing the following:   MAMMOGRAM and PHQ9    Action needed:   Patient needs to do PHQ9.   Patient needs referral/order: mammo    Type of outreach:    Sent Xiamhart message.    Questions for provider review:    None                                                                                                                                    Peyton NUGENT       Chart routed to none .

## 2019-06-06 ENCOUNTER — OFFICE VISIT (OUTPATIENT)
Dept: FAMILY MEDICINE | Facility: CLINIC | Age: 54
End: 2019-06-06
Payer: MEDICARE

## 2019-06-06 VITALS
BODY MASS INDEX: 20.58 KG/M2 | WEIGHT: 109 LBS | HEIGHT: 61 IN | DIASTOLIC BLOOD PRESSURE: 72 MMHG | SYSTOLIC BLOOD PRESSURE: 112 MMHG

## 2019-06-06 DIAGNOSIS — G25.81 RESTLESS LEGS SYNDROME: ICD-10-CM

## 2019-06-06 DIAGNOSIS — M20.41 HAMMER TOES OF BOTH FEET: ICD-10-CM

## 2019-06-06 DIAGNOSIS — R04.0 BLEEDING NOSE: ICD-10-CM

## 2019-06-06 DIAGNOSIS — G47.00 PERSISTENT DISORDER OF INITIATING OR MAINTAINING SLEEP: ICD-10-CM

## 2019-06-06 DIAGNOSIS — J33.9 RIGHT NASAL POLYPS: Primary | ICD-10-CM

## 2019-06-06 DIAGNOSIS — K21.00 GASTROESOPHAGEAL REFLUX DISEASE WITH ESOPHAGITIS: ICD-10-CM

## 2019-06-06 DIAGNOSIS — M20.42 HAMMER TOES OF BOTH FEET: ICD-10-CM

## 2019-06-06 PROCEDURE — 99214 OFFICE O/P EST MOD 30 MIN: CPT | Performed by: NURSE PRACTITIONER

## 2019-06-06 RX ORDER — ZOLPIDEM TARTRATE 10 MG/1
TABLET ORAL
Qty: 20 TABLET | Refills: 1 | Status: SHIPPED | OUTPATIENT
Start: 2019-06-06 | End: 2019-12-18

## 2019-06-06 RX ORDER — NICOTINE POLACRILEX 4 MG/1
20 GUM, CHEWING ORAL DAILY PRN
Qty: 30 TABLET | Refills: 3 | Status: SHIPPED | OUTPATIENT
Start: 2019-06-06

## 2019-06-06 RX ORDER — GABAPENTIN 100 MG/1
CAPSULE ORAL
Qty: 80 CAPSULE | Refills: 0 | Status: SHIPPED | OUTPATIENT
Start: 2019-06-06 | End: 2019-07-08

## 2019-06-06 ASSESSMENT — PATIENT HEALTH QUESTIONNAIRE - PHQ9
5. POOR APPETITE OR OVEREATING: MORE THAN HALF THE DAYS
SUM OF ALL RESPONSES TO PHQ QUESTIONS 1-9: 5

## 2019-06-06 ASSESSMENT — ANXIETY QUESTIONNAIRES
6. BECOMING EASILY ANNOYED OR IRRITABLE: NOT AT ALL
5. BEING SO RESTLESS THAT IT IS HARD TO SIT STILL: MORE THAN HALF THE DAYS
GAD7 TOTAL SCORE: 4
7. FEELING AFRAID AS IF SOMETHING AWFUL MIGHT HAPPEN: NOT AT ALL
3. WORRYING TOO MUCH ABOUT DIFFERENT THINGS: NOT AT ALL
1. FEELING NERVOUS, ANXIOUS, OR ON EDGE: NOT AT ALL
2. NOT BEING ABLE TO STOP OR CONTROL WORRYING: NOT AT ALL

## 2019-06-06 ASSESSMENT — MIFFLIN-ST. JEOR: SCORE: 1027.18

## 2019-06-06 NOTE — PATIENT INSTRUCTIONS
For the hammertoes  See Podiatry   Wear shoes that feel good.     For the restless legs  Start the gabapentin  Starting with the bedtime pill of r 7 days,  Then add in the morning tablet,   for 7 days then add in the afternoon tablet .    Then continue with the three times per day if needed.    Follow up with Dr. Lindsey for adjustment of medication     For the bloody nose and see ENT  You do have a nasal polyp ,   Be gentle with blowing your nose.     Stay well hydrated

## 2019-06-06 NOTE — PROGRESS NOTES
Subjective     Jackie Rodriguez is a 54 year old female who presents to clinic today for the following health issues:    HPI     Medication Followup of Ambien 10 mg     Taking Medication as prescribed: yes    Side Effects:  None    Medication Helping Symptoms:  yes      *Bloody noses from right nostril that will occur at least once daily. Denies any clots or pain. Has been going to the dentist and got a scan where he noted that there was a mass in the right maxillary sinus. Was told to follow up with a primary care doctor regarding this.      *Restless leg syndrome - Has been dealing with RLS for years that will mostly happen at night but does have some days that the symptoms will occur as well. Has not tried anything for this in the past.     *Hammertoe - Patient's mother is getting surgery for hammertoe correction next week and she noticed patient's feet looked similar to hers, told her to come get them looked at. Did have to increase shoe size in order to keep her feet comfortable.     Arms will always feel tingling ,   Will drop things.    States that she does have nerve damage.  Has had carpel tunnel surgery .   Her fingers are better during the summer.,    When it is cold outside it is hard to open her hands.               Patient Active Problem List   Diagnosis     Hypothyroidism     Persistent disorder of initiating or maintaining sleep     Exposure to Hep C     Bilateral ulnar neuropathy - cubital tunnel s/p b/l transpositions     Chronic b/l wirst/elbow pain     CARDIOVASCULAR SCREENING; LDL GOAL LESS THAN 160     History of drug abuse meth sober since 2007     Upper back strain     Enlarged lymph node  per mammo.   left axilla     Bilateral arm weakness     Headache     LINA (obstructive sleep apnea)     24 hour contact given to patient     Screening for diabetes mellitus     Finger fracture, right little finger     Vitamin D deficiency     T wave inversion in EKG     Cervical spinal stenosis C5-6, C6-7  discs and foraminal stenosis     Health Care Home     Dyspareunia     Situational depression     S/P bariatric surgery     Postsurgical malabsorption, not elsewhere classified      Past Surgical History:   Procedure Laterality Date     BARIATRIC SURGERY  3/6/2012    lola-en-y     carple tunnel      bilat      COLONOSCOPY N/A 12/7/2016    Procedure: COLONOSCOPY;  Surgeon: Frankie Gregorio MD;  Location: [facility]     HYSTERECTOMY, PAP NO LONGER INDICATED  1986    has left ovary     INJECT EPIDURAL CERVICAL  10/8/2012    Procedure: INJECT EPIDURAL CERVICAL;  ROMINA Cervical--;  Surgeon: Provider, Generic Anesthesia;  Location: [facility]     LAPAROSCOPIC SALPINGO-OOPHORECTOMY  10/21/2013    Procedure: LAPAROSCOPIC SALPINGO-OOPHORECTOMY;  Laparoscopic Left Salpingo-Oophorectomy;  Surgeon: Willie Leblanc MD;  Location: [facility]     TRANSPOSITION ULNAR NERVE (ELBOW)      right  x 2  left elbow x1       Social History     Tobacco Use     Smoking status: Former Smoker     Packs/day: 0.50     Last attempt to quit: 6/28/2008     Years since quitting: 10.9     Smokeless tobacco: Never Used   Substance Use Topics     Alcohol use: No     Family History   Problem Relation Age of Onset     Arthritis Mother      Obesity Mother      Thyroid Disease Mother      Neurologic Disorder Mother         migraine     Hammer toes Mother      Arthritis Father      Cancer Father         lung ca     Thyroid Disease Brother      Neurologic Disorder Brother         migraine     Thyroid Disease Sister      Thyroid Disease Sister      Heart Disease Sister      Obesity Sister      Psychotic Disorder Sister      Neurologic Disorder Sister         migraines     Obesity Sister      Neurologic Disorder Daughter         migraines         Current Outpatient Medications   Medication Sig Dispense Refill     levothyroxine (SYNTHROID/LEVOTHROID) 100 MCG tablet TAKE 1 TABLET(100 MCG) BY MOUTH DAILY 60 tablet 0     omeprazole 20 MG tablet Take 1 tablet  (20 mg) by mouth daily (Patient taking differently: Take 20 mg by mouth daily as needed ) 90 tablet 0     topiramate (TOPAMAX) 100 MG tablet Take 100 mg by mouth daily       zolpidem (AMBIEN) 10 MG tablet TAKE 1 TABLET BY MOUTH EVERY DAY AT BEDTIME AS NEEDED FOR SLEEP 20 tablet 1     fluticasone (FLONASE) 50 MCG/ACT nasal spray Spray 1-2 sprays into both nostrils daily (Patient not taking: Reported on 6/6/2019) 1 Bottle 11     tiZANidine (ZANAFLEX) 2 MG tablet Take 1 mg by mouth once as needed for other (for headache)       traMADol (ULTRAM) 50 MG tablet TAKE 1 TABLET BY MOUTH EVERY 6 HOURS AS NEEDED (Patient not taking: Reported on 6/6/2019) 10 tablet 0     Allergies   Allergen Reactions     Nkda [No Known Drug Allergies]      BP Readings from Last 3 Encounters:   06/06/19 112/72   06/18/18 102/74   01/12/18 114/70    Wt Readings from Last 3 Encounters:   06/06/19 49.4 kg (109 lb)   06/18/18 47.1 kg (103 lb 12.8 oz)   01/12/18 45.6 kg (100 lb 9.6 oz)                      Reviewed and updated as needed this visit by Provider  Tobacco  Allergies         Review of Systems   ROS COMP: CONSTITUTIONAL: NEGATIVE for fever, chills, change in weight  EYES: NEGATIVE for vision changes or irritation and POSITIVE for will have occasional double vision at night is working with neurologist did have negative  MRI of head. ( except for the sinus retention cyst. )   ENT/MOUTH: POSITIVE for nasal congestion, postnasal drainage and for almost a year has been getting a blood nose on the right side,is working with her dentist for dentures.    RESP: NEGATIVE for significant cough or SOB  CV: NEGATIVE for chest pain, palpitations or peripheral edema  NEURO: POSITIVE for  Intermittent headaches,  Is working with neurology  , does have restless legs,   States her legs will jump , crawl and moves her legs all of the time   ENDOCRINE: POSITIVE  for skin changes skin is dry around the fingernails.   PSYCHIATRIC: POSITIVE for stress related  "to her brother,  He does have health issues.        Objective    /72   Ht 1.55 m (5' 1.02\")   Wt 49.4 kg (109 lb)   BMI 20.58 kg/m    Body mass index is 20.58 kg/m .  Physical Exam   GENERAL:thin , alert and no distress  HENT: ear canals and TM's normal, nasal mucosa edematous , rhinorrhea clear, oropharynx clear, oral mucous membranes moist and sinuses: maxillary tenderness on right  NECK: no adenopathy, no asymmetry, masses, or scars and thyroid normal to palpation  RESP: lungs clear to auscultation - no rales, rhonchi or wheezes  CV: regular rate and rhythm, normal S1 S2, no S3 or S4, no murmur, click or rub, no peripheral edema and peripheral pulses strong  ABDOMEN: soft, nontender, no hepatosplenomegaly, no masses and bowel sounds normal  MS: toes are turning under .  No ulcerations.    Didn't notice restless legs during the visit.   NEURO: Normal strength and tone, mentation intact and speech normal  PSYCH: mentation appears normal, affect normal and judgement and insight intact.does have some stress related to her brother     Diagnostic Test Results:  Labs reviewed in Epic  Pending sinus film         ASSESSMENT/PLAN:      ICD-10-CM    1. Right nasal polyps J33.9 OTOLARYNGOLOGY REFERRAL   2. Persistent disorder of initiating or maintaining sleep G47.00 zolpidem (AMBIEN) 10 MG tablet   3. Bleeding nose R04.0 OTOLARYNGOLOGY REFERRAL   4. Restless legs syndrome G25.81 gabapentin (NEURONTIN) 100 MG capsule   5. Esophageal reflux K21.9    6. Gastroesophageal reflux disease with esophagitis K21.0 omeprazole 20 MG tablet   7. Hammer toes of both feet M20.41 PODIATRY/FOOT & ANKLE SURGERY REFERRAL    M20.42        Patient Instructions   For the hammertoes  See Podiatry   Wear shoes that feel good.     For the restless legs  Start the gabapentin  Starting with the bedtime pill of r 7 days,  Then add in the morning tablet,   for 7 days then add in the afternoon tablet .    Then continue with the three times per " day if needed.    Follow up with Dr. Lindsey for adjustment of medication     For the bloody nose and see ENT  You do have a nasal polyp ,   Be gentle with blowing your nose.     Stay well hydrated

## 2019-06-07 ASSESSMENT — ANXIETY QUESTIONNAIRES: GAD7 TOTAL SCORE: 4

## 2019-07-08 DIAGNOSIS — G25.81 RESTLESS LEGS SYNDROME: ICD-10-CM

## 2019-07-08 NOTE — TELEPHONE ENCOUNTER
Requested Prescriptions   Pending Prescriptions Disp Refills     gabapentin (NEURONTIN) 100 MG capsule [Pharmacy Med Name: GABAPENTIN 100MG CAPSULES] 80 capsule 0     Sig: TAKE ONE CAPSULE BY MOUTH AT BEDTIME X7 DAYS, 1 CAPSULE TWICE DAILY X7DAYS, THEN 1 CAPSULE THREE TIMES DAILY  Last Written Prescription Date:  06/06/2019 #80 x 0  Last filled 06/06/2019  Last office visit: 6/6/2019 ANEESH Young   Future Office Visit:  None         There is no refill protocol information for this order

## 2019-07-09 RX ORDER — GABAPENTIN 100 MG/1
100 CAPSULE ORAL 3 TIMES DAILY
Qty: 90 CAPSULE | Refills: 0 | Status: SHIPPED | OUTPATIENT
Start: 2019-07-09 | End: 2019-12-18

## 2019-07-09 NOTE — TELEPHONE ENCOUNTER
Routing refill request to provider for review/approval because:  Drug not on the [facility] refill protocol     Noemi Nolan RN

## 2019-08-13 ENCOUNTER — TRANSFERRED RECORDS (OUTPATIENT)
Dept: HEALTH INFORMATION MANAGEMENT | Facility: CLINIC | Age: 54
End: 2019-08-13

## 2019-08-16 ENCOUNTER — TRANSFERRED RECORDS (OUTPATIENT)
Dept: HEALTH INFORMATION MANAGEMENT | Facility: CLINIC | Age: 54
End: 2019-08-16

## 2019-08-19 ENCOUNTER — TELEPHONE (OUTPATIENT)
Dept: LAB | Facility: CLINIC | Age: 54
End: 2019-08-19

## 2019-08-19 NOTE — TELEPHONE ENCOUNTER
Labs onForgerryJackie  has  and a letter was sent on 19. Please contact the patient. Thank you!

## 2019-11-03 ENCOUNTER — HEALTH MAINTENANCE LETTER (OUTPATIENT)
Age: 54
End: 2019-11-03

## 2019-12-17 NOTE — PROGRESS NOTES
Subjective     Jackie Rodriguez is a 54 year old female who presents to clinic today for the following health issues:    HPI     Musculoskeletal problem/pain      Duration: couple of weeks    Description  Location: right shoulder    Intensity:  severe, Currently 8/10 - At it's worse 20/10    Accompanying signs and symptoms: radiation of pain to right hand, pain travels into right side of chest, numbness, tingling, weakness of right arm and nerve pain/damage.     History  Previous similar problem: no   Previous evaluation:  none    Precipitating or alleviating factors:  Trauma or overuse: YES-  was moving a recliner chair.   Aggravating factors include: reaching, getting dressed, elevating arm, lifting, exercise and cold or damp weather    Therapies tried and outcome: rest/inactivity, heat, ice, massage, stretching, acetaminophen, NSAID - naproxen and pad that you buy over the counter (solan pods).     Medication Followup of levothyroxine (SYNTHROID/LEVOTHROID) 100 MCG tablet     Taking Medication as prescribed: NO- trying to get back on track with everything    Side Effects:  Insomnia     Medication Helping Symptoms:  yes    Medication Followup of topiramate (TOPAMAX) 100 MG tablet     Taking Medication as prescribed: yes    Side Effects:  None    Medication Helping Symptoms:  yes      Medication Followup of gabapentin 100 mg (RLS)    Taking Medication as prescribed: yes    Side Effects:  None    Medication Helping Symptoms:  yes     Medication Followup of omeprazole 20 mg (GERD)    Taking Medication as prescribed: yes    Side Effects:  None    Medication Helping Symptoms:  yes     Medication Followup of zolpidem 10 mg (sleep disorder)    Taking Medication as prescribed: yes    Side Effects:  None    Medication Helping Symptoms:  yes       Current Outpatient Medications   Medication Sig Dispense Refill     gabapentin (NEURONTIN) 100 MG capsule Take 1 capsule (100 mg) by mouth At Bedtime 90 capsule 3      "levothyroxine (SYNTHROID/LEVOTHROID) 100 MCG tablet TAKE 1 TABLET(100 MCG) BY MOUTH DAILY 60 tablet 0     omeprazole 20 MG tablet Take 1 tablet (20 mg) by mouth daily as needed (for reflux) 30 tablet 3     predniSONE (DELTASONE) 20 MG tablet Take 2 tablets (40 mg) by mouth daily 10 tablet 0     tiZANidine (ZANAFLEX) 2 MG tablet Take 1 mg by mouth once as needed for other (for headache)       topiramate (TOPAMAX) 100 MG tablet Take 100 mg by mouth daily       traMADol (ULTRAM) 50 MG tablet TAKE 1 TABLET BY MOUTH EVERY 12 HOURS AS NEEDED 20 tablet 0     zolpidem (AMBIEN) 10 MG tablet TAKE 1 TABLET BY MOUTH EVERY DAY AT BEDTIME AS NEEDED FOR SLEEP 30 tablet 0     fluticasone (FLONASE) 50 MCG/ACT nasal spray Spray 1-2 sprays into both nostrils daily (Patient not taking: Reported on 2019) 1 Bottle 11     Allergies   Allergen Reactions     Nkda [No Known Drug Allergies]        Reviewed and updated as needed this visit by Provider  Problems         Right shoulder pain for the last couple of weeks. Does have some underlying numbness but is a bit worse. When tries to move it will get \"shock waves\" Taking tylenol and ibrpofen. Will take 2 tylenol per day. Does not really take iburofen, tried it for 48 hours to see if that helps. Has tried more of the rubs and they do give minor relief. Does not work, no repetitive motions. Is right handed. No previous injury to this shoulder.     Needs to have a refill of gabapentin, levothyroxine and then is requesting a refill of tramodol, and ambien.     Last prescription for tramadol and Ambien and gabapentin and levothyroxine some months ago.  Reports daughter recently .  Has really been trying to get things back on track.  Likes to have prescription of tramadol on hand to take if needed if is having a lot of neuropathic pain.  Reports takes gabapentin only nightly however, it has previously been prescribed 3 times per day.  Takes it for restless legs.  Reports does not use it " "to help with nerve pain.  Reports using levothyroxine in the past has made it difficult for her to sleep at night.  Typically, takes levothyroxine in the evening.  Had not been on levothyroxine for months however, last week started taking an old prescription that has at home.  Unsure of dosage of that medication or how old it is.  Has previously tried to separate levothyroxine and take it in the morning and found that would always forget to take it.  Reports takes a half a tab of Ambien around 10:00 at night when likes to go to bed.  Typically likes to wake at 6 AM.  If wakes during the middle of the night at 2 or 3 AM will take the other half of Ambien.    Needs to get influenza vaccine here today.       Objective    /82 (BP Location: Left arm, Patient Position: Sitting, Cuff Size: Adult Regular)   Pulse 79   Temp 97.7  F (36.5  C) (Tympanic)   Resp 14   Ht 1.562 m (5' 1.5\")   Wt 56.4 kg (124 lb 6.4 oz)   SpO2 100%   BMI 23.12 kg/m    Body mass index is 23.12 kg/m .            Assessment & Plan     Review of Systems   Constitutional: Negative for fatigue.   HENT: Negative for ear pain, rhinorrhea and sore throat.    Eyes: Negative for visual disturbance.   Respiratory: Negative for cough, chest tightness, shortness of breath and wheezing.    Cardiovascular: Negative for chest pain, palpitations and leg swelling.   Gastrointestinal: Negative for abdominal distention, abdominal pain, constipation, diarrhea, nausea and vomiting.   Endocrine: Negative for cold intolerance and heat intolerance.   Musculoskeletal: Positive for arthralgias (right shoulder pain). Negative for myalgias.   Skin: Negative for rash.   Neurological: Positive for numbness. Negative for dizziness, light-headedness and headaches.   Psychiatric/Behavioral: Positive for sleep disturbance. Negative for dysphoric mood. The patient is not nervous/anxious.        Physical Exam  Constitutional:       Appearance: Normal appearance. She is " well-developed.   HENT:      Head: Normocephalic and atraumatic.      Right Ear: Tympanic membrane and external ear normal. No middle ear effusion.      Left Ear: Tympanic membrane and external ear normal.  No middle ear effusion.      Nose: No mucosal edema.   Neck:      Thyroid: No thyromegaly.      Vascular: No carotid bruit.   Cardiovascular:      Rate and Rhythm: Normal rate and regular rhythm.      Heart sounds: Normal heart sounds.   Pulmonary:      Effort: Pulmonary effort is normal.      Breath sounds: Normal breath sounds.   Abdominal:      General: Bowel sounds are normal.      Palpations: Abdomen is soft.   Musculoskeletal:      Right shoulder: She exhibits decreased range of motion, tenderness, bony tenderness, pain and decreased strength.   Skin:     General: Skin is warm and dry.   Neurological:      Mental Status: She is alert.   Psychiatric:         Behavior: Behavior normal.         1. Encounter for screening mammogram for breast cancer  Order placed, plans to call per self and set up  - MA SCREENING DIGITAL BILAT - Future  (s+30); Future    2. Hypothyroidism, unspecified type  We will have stopped current dose of levothyroxine at home that is taking.  Plan to return in 1 to 2 weeks for recheck of labs.  We will need to reinitiate therapy.  - TSH WITH FREE T4 REFLEX; Future  - Basic metabolic panel; Future    3. Screening for diabetes mellitus  Return for fasting labs    4. Screening for lipoid disorders  Return for fasting labs  - Lipid panel reflex to direct LDL Fasting; Future    5. Need for influenza vaccination  Administered today in clinic  - INFLUENZA QUAD, RECOMBINANT, P-FREE (RIV4) (FLUBLOCK) [03730]  - VACCINE ADMINISTRATION, INITIAL    6. Impingement syndrome, shoulder, right  History gastric bypass surgery-will avoid NSAIDs.  Prescription given for prednisone.  Educated on use.  Plan to follow-up with orthopedics  - ORTH[facility] REFERRAL  - predniSONE (DELTASONE) 20 MG tablet; Take 2  tablets (40 mg) by mouth daily  Dispense: 10 tablet; Refill: 0  - traMADol (ULTRAM) 50 MG tablet; TAKE 1 TABLET BY MOUTH EVERY 12 HOURS AS NEEDED  Dispense: 20 tablet; Refill: 0    7. Restless legs syndrome  Refills given  - gabapentin (NEURONTIN) 100 MG capsule; Take 1 capsule (100 mg) by mouth At Bedtime  Dispense: 90 capsule; Refill: 3    8. Persistent disorder of initiating or maintaining sleep  Refills given  - zolpidem (AMBIEN) 10 MG tablet; TAKE 1 TABLET BY MOUTH EVERY DAY AT BEDTIME AS NEEDED FOR SLEEP  Dispense: 30 tablet; Refill: 0    Encouraged to follow-up with PCP for further refills.       No follow-ups on file.    ANDREZ Rodriguez [facility]

## 2019-12-18 ENCOUNTER — OFFICE VISIT (OUTPATIENT)
Dept: FAMILY MEDICINE | Facility: CLINIC | Age: 54
End: 2019-12-18
Payer: MEDICARE

## 2019-12-18 VITALS
WEIGHT: 124.4 LBS | OXYGEN SATURATION: 100 % | HEIGHT: 62 IN | HEART RATE: 79 BPM | BODY MASS INDEX: 22.89 KG/M2 | TEMPERATURE: 97.7 F | RESPIRATION RATE: 14 BRPM | DIASTOLIC BLOOD PRESSURE: 82 MMHG | SYSTOLIC BLOOD PRESSURE: 110 MMHG

## 2019-12-18 DIAGNOSIS — G25.81 RESTLESS LEGS SYNDROME: ICD-10-CM

## 2019-12-18 DIAGNOSIS — E03.9 HYPOTHYROIDISM, UNSPECIFIED TYPE: ICD-10-CM

## 2019-12-18 DIAGNOSIS — Z12.31 ENCOUNTER FOR SCREENING MAMMOGRAM FOR BREAST CANCER: Primary | ICD-10-CM

## 2019-12-18 DIAGNOSIS — G47.00 PERSISTENT DISORDER OF INITIATING OR MAINTAINING SLEEP: ICD-10-CM

## 2019-12-18 DIAGNOSIS — Z23 NEED FOR INFLUENZA VACCINATION: ICD-10-CM

## 2019-12-18 DIAGNOSIS — Z13.1 SCREENING FOR DIABETES MELLITUS: ICD-10-CM

## 2019-12-18 DIAGNOSIS — M75.41 IMPINGEMENT SYNDROME, SHOULDER, RIGHT: ICD-10-CM

## 2019-12-18 DIAGNOSIS — Z13.220 SCREENING FOR LIPOID DISORDERS: ICD-10-CM

## 2019-12-18 PROCEDURE — G0008 ADMIN INFLUENZA VIRUS VAC: HCPCS | Performed by: NURSE PRACTITIONER

## 2019-12-18 PROCEDURE — 99214 OFFICE O/P EST MOD 30 MIN: CPT | Mod: 25 | Performed by: NURSE PRACTITIONER

## 2019-12-18 PROCEDURE — 90682 RIV4 VACC RECOMBINANT DNA IM: CPT | Performed by: NURSE PRACTITIONER

## 2019-12-18 RX ORDER — TRAMADOL HYDROCHLORIDE 50 MG/1
TABLET ORAL
Qty: 20 TABLET | Refills: 0 | Status: SHIPPED | OUTPATIENT
Start: 2019-12-18 | End: 2020-03-24

## 2019-12-18 RX ORDER — PREDNISONE 20 MG/1
40 TABLET ORAL DAILY
Qty: 10 TABLET | Refills: 0 | Status: SHIPPED | OUTPATIENT
Start: 2019-12-18 | End: 2020-11-20

## 2019-12-18 RX ORDER — ZOLPIDEM TARTRATE 10 MG/1
TABLET ORAL
Qty: 30 TABLET | Refills: 0 | Status: SHIPPED | OUTPATIENT
Start: 2019-12-18 | End: 2020-03-24

## 2019-12-18 RX ORDER — GABAPENTIN 100 MG/1
100 CAPSULE ORAL AT BEDTIME
Qty: 90 CAPSULE | Refills: 3 | Status: SHIPPED | OUTPATIENT
Start: 2019-12-18 | End: 2020-11-20

## 2019-12-18 ASSESSMENT — ENCOUNTER SYMPTOMS
ABDOMINAL PAIN: 0
PALPITATIONS: 0
DIARRHEA: 0
SHORTNESS OF BREATH: 0
WHEEZING: 0
VOMITING: 0
CONSTIPATION: 0
HEADACHES: 0
RHINORRHEA: 0
NAUSEA: 0
SORE THROAT: 0
COUGH: 0
NERVOUS/ANXIOUS: 0
DIZZINESS: 0
SLEEP DISTURBANCE: 1
MYALGIAS: 0
FATIGUE: 0
LIGHT-HEADEDNESS: 0
ABDOMINAL DISTENTION: 0
ARTHRALGIAS: 1
CHEST TIGHTNESS: 0
NUMBNESS: 1
DYSPHORIC MOOD: 0

## 2019-12-18 ASSESSMENT — MIFFLIN-ST. JEOR: SCORE: 1104.58

## 2020-02-10 ENCOUNTER — HEALTH MAINTENANCE LETTER (OUTPATIENT)
Age: 55
End: 2020-02-10

## 2020-03-11 ENCOUNTER — TELEPHONE (OUTPATIENT)
Dept: FAMILY MEDICINE | Facility: CLINIC | Age: 55
End: 2020-03-11

## 2020-03-11 NOTE — TELEPHONE ENCOUNTER
Panel Management Review      Patient has the following on her problem list: None      Composite cancer screening  Chart review shows that this patient is due/due soon for the following Mammogram  Summary:    Patient is due/failing the following:     Health Maintenance Due   Topic Date Due     ADVANCE CARE PLANNING  1965     MIGRAINE ACTION PLAN  1965     ZOSTER IMMUNIZATION (1 of 2) 04/07/2014     MAMMO SCREENING  12/07/2018     MEDICARE ANNUAL WELLNESS VISIT  01/12/2019     TSH W/FREE T4 REFLEX  07/03/2019     PHQ-2  01/01/2020         Action needed:   Mammogram, fasting lab appointment (TSH, Lipid, BMP)    Type of outreach:    Sent letter.    Questions for provider review:    None                                                                                                                                    April ARIADNA Sauer       Chart routed to none .

## 2020-03-11 NOTE — LETTER
March 11, 2020      Jackie Rodriguez  [address] [address]      Dear Jackie Rodriguez    We care about your health and have reviewed your health plan including your medical conditions, medication list, and lab results.  Based on this review, it is recommended that you follow up regarding the following health topic(s):     -Cholesterol  -Breast Cancer Screening  -Hypothyroidism    We recommend you take the following action(s):  -schedule a LAB ONLY APPOINTMENT to recheck your: Lipids (fasting cholesterol - nothing to eat except water and/or meds for 8-10 hours), BMP (basic metabolic panel) and TSH (thyroid test) within the next 1-4 weeks.  If' you have had labs completed eslewhere, please let us know so we can update your records.    -schedule a MAMMOGRAM which is due. Please disregard this reminder if you have had this exam elsewhere within the last 1-2 years please let us know so we can update your records.    Please call us at [phone] (or use [employer]) to address the above recommendations.     Thank you for trusting [facility] and we appreciate the opportunity to serve you.  We look forward to supporting your healthcare needs in the future.    Healthy Regards,      Your Health Care Team  [facility]

## 2020-03-23 DIAGNOSIS — M75.41 IMPINGEMENT SYNDROME, SHOULDER, RIGHT: ICD-10-CM

## 2020-03-23 DIAGNOSIS — G47.00 PERSISTENT DISORDER OF INITIATING OR MAINTAINING SLEEP: ICD-10-CM

## 2020-03-23 NOTE — TELEPHONE ENCOUNTER
Zolpidem 10mg      Last Written Prescription Date:  12/18/2019 #30 x 0  Last filled - not provided      Tramadol 50mg      Last Written Prescription Date:  12/18/2019 #20 x 0  Last filled - not provided    Last Office Visit: 12/18/2019 ANEESH Ramirez  Future Office visit:   None    Routing refill request to provider for review/approval because:  Drug not on the [facility], [facility] or  Health refill protocol or controlled substance

## 2020-03-24 RX ORDER — TRAMADOL HYDROCHLORIDE 50 MG/1
TABLET ORAL
Qty: 20 TABLET | Refills: 0 | Status: SHIPPED | OUTPATIENT
Start: 2020-03-24 | End: 2021-01-26

## 2020-03-24 RX ORDER — ZOLPIDEM TARTRATE 10 MG/1
TABLET ORAL
Qty: 30 TABLET | Refills: 0 | Status: SHIPPED | OUTPATIENT
Start: 2020-03-24 | End: 2020-11-20

## 2020-06-27 DIAGNOSIS — G47.00 PERSISTENT DISORDER OF INITIATING OR MAINTAINING SLEEP: ICD-10-CM

## 2020-06-27 DIAGNOSIS — E03.9 HYPOTHYROIDISM, UNSPECIFIED TYPE: ICD-10-CM

## 2020-06-27 RX ORDER — LEVOTHYROXINE SODIUM 100 UG/1
TABLET ORAL
Qty: 60 TABLET | Refills: 0 | Status: CANCELLED | OUTPATIENT
Start: 2020-06-27

## 2020-06-29 RX ORDER — LEVOTHYROXINE SODIUM 100 UG/1
TABLET ORAL
Qty: 60 TABLET | Refills: 0 | OUTPATIENT
Start: 2020-06-29

## 2020-06-29 RX ORDER — ZOLPIDEM TARTRATE 10 MG/1
TABLET ORAL
Qty: 30 TABLET | OUTPATIENT
Start: 2020-06-29

## 2020-06-29 NOTE — TELEPHONE ENCOUNTER
Chart reviewed.  Patient needs follow-up appointment for further refills.  Additionally, patient is overdue for lab work.  Will have patient contacted to schedule.    Lizbeth Wilson PA-C on 6/29/2020 at 12:05 PM

## 2020-06-29 NOTE — TELEPHONE ENCOUNTER
Tried to call pt and voicemail was full and could not leave a msg.     Ashleigh Lawton, [place]

## 2020-06-29 NOTE — TELEPHONE ENCOUNTER
Routing refill request to provider for review/approval because:  Drug not on the [facility] refill protocol   Labs not current:    Shanna Vega RN

## 2020-07-21 NOTE — TELEPHONE ENCOUNTER
Pt has called back and needs her levothyroxine filled she will be in [facility] for 2 weeks, transferred her to scheduling to make a lab appt.  Phar is [facility], off [address].    Eboni Rahman  [facility]

## 2020-07-22 NOTE — TELEPHONE ENCOUNTER
Patient has a lab appointment scheduled for 8.4.2020. Patient is out of town for 2 weeks and requesting a refill of levothyroxine.  Routed to provider.

## 2020-07-23 NOTE — TELEPHONE ENCOUNTER
Please find out if pt has been taking the medication.  We last filled it in May of 2019.  If she has been off, there is no point in a lab visit 8/4, she will need to be back on her medication for at least 8 weeks before rechecking.    Please find out if she has been taking this medication consistently.  She should do a lab visit 8 weeks after that is true.    Lulu Lindsey, DO

## 2020-07-27 NOTE — TELEPHONE ENCOUNTER
Patient reports that she has been on levothyroxine 112 mcg. She ran out and is taking an older prescription of her moms. She has a lab appointment on 8.4.2020. She would like a refill of levothyroxine. Routed to provider.  Poonam Tobar RN

## 2020-07-27 NOTE — TELEPHONE ENCOUNTER
How long has she been taking the 112mcg everyday?  If it is less then 8 weeks, please fill the 100mcg dose for a 2 months supply and schedule a lab visit in 8 weeks.    If it is for >8 weeks she should continue to 112mcg dose till her labs are available.    The duration makes a big difference here.    Lulu Lindsey, DO

## 2020-07-28 NOTE — TELEPHONE ENCOUNTER
Call placed to patient.  Patient has been taking the 112mcg levothyroxine for 1 week.  Relayed recommendation for dosing 100mcg and when to check lab again.  Patient verbalizes understanding and has scheduling number for lab.  Shanna Vega RN

## 2020-08-06 DIAGNOSIS — E03.9 HYPOTHYROIDISM, UNSPECIFIED TYPE: ICD-10-CM

## 2020-08-06 RX ORDER — LEVOTHYROXINE SODIUM 100 UG/1
TABLET ORAL
Qty: 30 TABLET | Refills: 0 | Status: SHIPPED | OUTPATIENT
Start: 2020-08-06 | End: 2020-09-03

## 2020-08-06 NOTE — TELEPHONE ENCOUNTER
Pt is calling and looking for her thyroid medication to be refilled.     Ashleigh Lawton, [place]

## 2020-08-06 NOTE — TELEPHONE ENCOUNTER
Please call pt.  She needs a lab visit prior to her next refill.  Filled for 30 days only.  I cannot fill beyond that without lab results.    Lulu Lindsey, DO

## 2020-09-03 DIAGNOSIS — E03.9 HYPOTHYROIDISM, UNSPECIFIED TYPE: ICD-10-CM

## 2020-09-03 RX ORDER — LEVOTHYROXINE SODIUM 100 UG/1
TABLET ORAL
Qty: 30 TABLET | Refills: 0 | Status: SHIPPED | OUTPATIENT
Start: 2020-09-03 | End: 2020-09-23

## 2020-09-22 DIAGNOSIS — Z13.220 SCREENING FOR LIPOID DISORDERS: ICD-10-CM

## 2020-09-22 DIAGNOSIS — E03.9 HYPOTHYROIDISM, UNSPECIFIED TYPE: ICD-10-CM

## 2020-09-22 LAB
ANION GAP SERPL CALCULATED.3IONS-SCNC: 8 MMOL/L (ref 3–14)
BUN SERPL-MCNC: 12 MG/DL (ref 7–30)
CALCIUM SERPL-MCNC: 8.6 MG/DL (ref 8.5–10.1)
CHLORIDE SERPL-SCNC: 111 MMOL/L (ref 94–109)
CHOLEST SERPL-MCNC: 185 MG/DL
CO2 SERPL-SCNC: 25 MMOL/L (ref 20–32)
CREAT SERPL-MCNC: 0.93 MG/DL (ref 0.52–1.04)
GFR SERPL CREATININE-BSD FRML MDRD: 68 ML/MIN/{1.73_M2}
GLUCOSE SERPL-MCNC: 78 MG/DL (ref 70–99)
HDLC SERPL-MCNC: 86 MG/DL
LDLC SERPL CALC-MCNC: 85 MG/DL
NONHDLC SERPL-MCNC: 99 MG/DL
POTASSIUM SERPL-SCNC: 3.7 MMOL/L (ref 3.4–5.3)
SODIUM SERPL-SCNC: 144 MMOL/L (ref 133–144)
T4 FREE SERPL-MCNC: 0.72 NG/DL (ref 0.76–1.46)
TRIGL SERPL-MCNC: 72 MG/DL
TSH SERPL DL<=0.005 MIU/L-ACNC: 58.98 MU/L (ref 0.4–4)

## 2020-09-22 PROCEDURE — 84443 ASSAY THYROID STIM HORMONE: CPT | Performed by: NURSE PRACTITIONER

## 2020-09-22 PROCEDURE — 80048 BASIC METABOLIC PNL TOTAL CA: CPT | Performed by: NURSE PRACTITIONER

## 2020-09-22 PROCEDURE — 36415 COLL VENOUS BLD VENIPUNCTURE: CPT | Performed by: NURSE PRACTITIONER

## 2020-09-22 PROCEDURE — 80061 LIPID PANEL: CPT | Performed by: NURSE PRACTITIONER

## 2020-09-22 PROCEDURE — 84439 ASSAY OF FREE THYROXINE: CPT | Performed by: NURSE PRACTITIONER

## 2020-09-23 ENCOUNTER — TELEPHONE (OUTPATIENT)
Dept: FAMILY MEDICINE | Facility: CLINIC | Age: 55
End: 2020-09-23

## 2020-09-23 DIAGNOSIS — E03.9 HYPOTHYROIDISM, UNSPECIFIED TYPE: Primary | ICD-10-CM

## 2020-09-23 RX ORDER — LEVOTHYROXINE SODIUM 50 UG/1
50 TABLET ORAL DAILY
Qty: 30 TABLET | Refills: 1 | Status: SHIPPED | OUTPATIENT
Start: 2020-09-23 | End: 2020-11-20 | Stop reason: DRUGHIGH

## 2020-09-23 NOTE — TELEPHONE ENCOUNTER
Patient notified of results and recommendations. She will  medication and make a lab appointment 6-8 weeks after starting medication.  Poonam Tobar RN

## 2020-09-23 NOTE — TELEPHONE ENCOUNTER
Please call patient with results.      Appears that she has not been taking her levothyroxine-please verify this.  It is best to draw labs when has been taking her medication for at least 6 weeks.  Would recommend restarting levothyroxine 50 mcg orally daily.  I have sent that prescription to the pharmacy.  Will need a lab only appointment in 6 to 8 weeks.  Please reinforce importance of following through with follow-up lab work.  Prescription sent to [facility] in [place].    Your lipids are in normal range.   Your electrolytes are in acceptable range.  Your blood sugar is normal.  Your kidney function is normal.    Randi SANDOVAL

## 2020-11-03 ENCOUNTER — TELEPHONE (OUTPATIENT)
Dept: FAMILY MEDICINE | Facility: CLINIC | Age: 55
End: 2020-11-03

## 2020-11-03 DIAGNOSIS — G47.00 PERSISTENT DISORDER OF INITIATING OR MAINTAINING SLEEP: ICD-10-CM

## 2020-11-04 NOTE — TELEPHONE ENCOUNTER
Routing refill request to provider for review/approval because:  Drug not on the [facility], [facility] or [facility] refill protocol or controlled substance  Poonam Tobar RN

## 2020-11-06 RX ORDER — ZOLPIDEM TARTRATE 10 MG/1
TABLET ORAL
Qty: 30 TABLET | OUTPATIENT
Start: 2020-11-06

## 2020-11-06 NOTE — TELEPHONE ENCOUNTER
Med denied.  Please call pt.  She is long overdue for lab follow up.  Needs lab visit for thyroid.  Order already available.    Lulu Lindsey, DO

## 2020-11-16 ENCOUNTER — HEALTH MAINTENANCE LETTER (OUTPATIENT)
Age: 55
End: 2020-11-16

## 2020-11-19 DIAGNOSIS — E03.9 HYPOTHYROIDISM, UNSPECIFIED TYPE: ICD-10-CM

## 2020-11-19 RX ORDER — LEVOTHYROXINE SODIUM 50 UG/1
50 TABLET ORAL DAILY
Qty: 30 TABLET | Refills: 1 | Status: CANCELLED | OUTPATIENT
Start: 2020-11-19

## 2020-11-20 ENCOUNTER — OFFICE VISIT (OUTPATIENT)
Dept: FAMILY MEDICINE | Facility: CLINIC | Age: 55
End: 2020-11-20
Payer: MEDICARE

## 2020-11-20 VITALS
WEIGHT: 114.6 LBS | OXYGEN SATURATION: 100 % | HEIGHT: 61 IN | RESPIRATION RATE: 16 BRPM | SYSTOLIC BLOOD PRESSURE: 116 MMHG | BODY MASS INDEX: 21.64 KG/M2 | TEMPERATURE: 98.6 F | DIASTOLIC BLOOD PRESSURE: 74 MMHG | HEART RATE: 75 BPM

## 2020-11-20 DIAGNOSIS — E03.9 HYPOTHYROIDISM, UNSPECIFIED TYPE: Primary | ICD-10-CM

## 2020-11-20 DIAGNOSIS — B96.89 BV (BACTERIAL VAGINOSIS): ICD-10-CM

## 2020-11-20 DIAGNOSIS — F43.21 ADJUSTMENT DISORDER WITH DEPRESSED MOOD: ICD-10-CM

## 2020-11-20 DIAGNOSIS — G43.109 MIGRAINE WITH AURA AND WITHOUT STATUS MIGRAINOSUS, NOT INTRACTABLE: ICD-10-CM

## 2020-11-20 DIAGNOSIS — K91.2 POSTSURGICAL MALABSORPTION, NOT ELSEWHERE CLASSIFIED: ICD-10-CM

## 2020-11-20 DIAGNOSIS — G47.00 PERSISTENT DISORDER OF INITIATING OR MAINTAINING SLEEP: ICD-10-CM

## 2020-11-20 DIAGNOSIS — G25.81 RESTLESS LEGS SYNDROME: ICD-10-CM

## 2020-11-20 DIAGNOSIS — D64.9 ANEMIA, UNSPECIFIED TYPE: ICD-10-CM

## 2020-11-20 DIAGNOSIS — B37.31 YEAST INFECTION OF THE VAGINA: ICD-10-CM

## 2020-11-20 DIAGNOSIS — R04.0 EPISTAXIS: ICD-10-CM

## 2020-11-20 DIAGNOSIS — Z23 NEED FOR PROPHYLACTIC VACCINATION AND INOCULATION AGAINST INFLUENZA: ICD-10-CM

## 2020-11-20 DIAGNOSIS — N76.0 BV (BACTERIAL VAGINOSIS): ICD-10-CM

## 2020-11-20 DIAGNOSIS — N89.8 VAGINAL ITCHING: ICD-10-CM

## 2020-11-20 PROBLEM — K56.609 SMALL BOWEL OBSTRUCTION (H): Status: ACTIVE | Noted: 2019-08-13

## 2020-11-20 LAB
ALBUMIN SERPL-MCNC: 4.2 G/DL (ref 3.4–5)
ALP SERPL-CCNC: 184 U/L (ref 40–150)
ALT SERPL W P-5'-P-CCNC: 52 U/L (ref 0–50)
ANION GAP SERPL CALCULATED.3IONS-SCNC: 5 MMOL/L (ref 3–14)
AST SERPL W P-5'-P-CCNC: 54 U/L (ref 0–45)
BASOPHILS # BLD AUTO: 0.1 10E9/L (ref 0–0.2)
BASOPHILS NFR BLD AUTO: 0.9 %
BILIRUB SERPL-MCNC: 0.3 MG/DL (ref 0.2–1.3)
BUN SERPL-MCNC: 16 MG/DL (ref 7–30)
CALCIUM SERPL-MCNC: 9.3 MG/DL (ref 8.5–10.1)
CHLORIDE SERPL-SCNC: 106 MMOL/L (ref 94–109)
CHOLEST SERPL-MCNC: 215 MG/DL
CO2 SERPL-SCNC: 28 MMOL/L (ref 20–32)
CREAT SERPL-MCNC: 1.14 MG/DL (ref 0.52–1.04)
DIFFERENTIAL METHOD BLD: ABNORMAL
EOSINOPHIL # BLD AUTO: 0.5 10E9/L (ref 0–0.7)
EOSINOPHIL NFR BLD AUTO: 6.7 %
ERYTHROCYTE [DISTWIDTH] IN BLOOD BY AUTOMATED COUNT: 15.6 % (ref 10–15)
FERRITIN SERPL-MCNC: 6 NG/ML (ref 8–252)
GFR SERPL CREATININE-BSD FRML MDRD: 53 ML/MIN/{1.73_M2}
GLUCOSE SERPL-MCNC: 90 MG/DL (ref 70–99)
HCT VFR BLD AUTO: 36.2 % (ref 35–47)
HDLC SERPL-MCNC: 102 MG/DL
HGB BLD-MCNC: 10.8 G/DL (ref 11.7–15.7)
IRON SATN MFR SERPL: 4 % (ref 15–46)
IRON SERPL-MCNC: 22 UG/DL (ref 35–180)
LDLC SERPL CALC-MCNC: 97 MG/DL
LYMPHOCYTES # BLD AUTO: 2.2 10E9/L (ref 0.8–5.3)
LYMPHOCYTES NFR BLD AUTO: 32.4 %
MCH RBC QN AUTO: 23.1 PG (ref 26.5–33)
MCHC RBC AUTO-ENTMCNC: 29.8 G/DL (ref 31.5–36.5)
MCV RBC AUTO: 78 FL (ref 78–100)
MONOCYTES # BLD AUTO: 0.5 10E9/L (ref 0–1.3)
MONOCYTES NFR BLD AUTO: 7.8 %
NEUTROPHILS # BLD AUTO: 3.5 10E9/L (ref 1.6–8.3)
NEUTROPHILS NFR BLD AUTO: 52.2 %
NONHDLC SERPL-MCNC: 113 MG/DL
PLATELET # BLD AUTO: 347 10E9/L (ref 150–450)
POTASSIUM SERPL-SCNC: 3.4 MMOL/L (ref 3.4–5.3)
PROT SERPL-MCNC: 7.9 G/DL (ref 6.8–8.8)
RBC # BLD AUTO: 4.67 10E12/L (ref 3.8–5.2)
RETICS # AUTO: 27 10E9/L (ref 25–95)
RETICS/RBC NFR AUTO: 0.6 % (ref 0.5–2)
SODIUM SERPL-SCNC: 139 MMOL/L (ref 133–144)
SPECIMEN SOURCE: ABNORMAL
T4 FREE SERPL-MCNC: 0.54 NG/DL (ref 0.76–1.46)
TIBC SERPL-MCNC: 587 UG/DL (ref 240–430)
TRIGL SERPL-MCNC: 81 MG/DL
TSH SERPL DL<=0.005 MIU/L-ACNC: 99.72 MU/L (ref 0.4–4)
VIT B12 SERPL-MCNC: 404 PG/ML (ref 193–986)
WBC # BLD AUTO: 6.7 10E9/L (ref 4–11)
WET PREP SPEC: ABNORMAL

## 2020-11-20 PROCEDURE — 85025 COMPLETE CBC W/AUTO DIFF WBC: CPT | Performed by: PHYSICIAN ASSISTANT

## 2020-11-20 PROCEDURE — 82728 ASSAY OF FERRITIN: CPT | Performed by: PHYSICIAN ASSISTANT

## 2020-11-20 PROCEDURE — 80053 COMPREHEN METABOLIC PANEL: CPT | Performed by: PHYSICIAN ASSISTANT

## 2020-11-20 PROCEDURE — 90682 RIV4 VACC RECOMBINANT DNA IM: CPT | Performed by: PHYSICIAN ASSISTANT

## 2020-11-20 PROCEDURE — 87210 SMEAR WET MOUNT SALINE/INK: CPT | Performed by: PHYSICIAN ASSISTANT

## 2020-11-20 PROCEDURE — G0008 ADMIN INFLUENZA VIRUS VAC: HCPCS | Performed by: PHYSICIAN ASSISTANT

## 2020-11-20 PROCEDURE — 99214 OFFICE O/P EST MOD 30 MIN: CPT | Mod: 25 | Performed by: PHYSICIAN ASSISTANT

## 2020-11-20 PROCEDURE — 83550 IRON BINDING TEST: CPT | Performed by: PHYSICIAN ASSISTANT

## 2020-11-20 PROCEDURE — 82607 VITAMIN B-12: CPT | Performed by: PHYSICIAN ASSISTANT

## 2020-11-20 PROCEDURE — 85045 AUTOMATED RETICULOCYTE COUNT: CPT | Performed by: PHYSICIAN ASSISTANT

## 2020-11-20 PROCEDURE — 83540 ASSAY OF IRON: CPT | Performed by: PHYSICIAN ASSISTANT

## 2020-11-20 PROCEDURE — 80061 LIPID PANEL: CPT | Performed by: PHYSICIAN ASSISTANT

## 2020-11-20 PROCEDURE — 36415 COLL VENOUS BLD VENIPUNCTURE: CPT | Performed by: PHYSICIAN ASSISTANT

## 2020-11-20 PROCEDURE — 82306 VITAMIN D 25 HYDROXY: CPT | Performed by: PHYSICIAN ASSISTANT

## 2020-11-20 PROCEDURE — 84443 ASSAY THYROID STIM HORMONE: CPT | Performed by: PHYSICIAN ASSISTANT

## 2020-11-20 PROCEDURE — 84439 ASSAY OF FREE THYROXINE: CPT | Performed by: PHYSICIAN ASSISTANT

## 2020-11-20 RX ORDER — GABAPENTIN 100 MG/1
200 CAPSULE ORAL AT BEDTIME
Qty: 180 CAPSULE | Refills: 0 | Status: SHIPPED | OUTPATIENT
Start: 2020-11-20 | End: 2021-01-26

## 2020-11-20 RX ORDER — LEVOTHYROXINE SODIUM 100 UG/1
TABLET ORAL
COMMUNITY
Start: 2020-09-03 | End: 2020-12-03

## 2020-11-20 RX ORDER — PROPRANOLOL HYDROCHLORIDE 20 MG/1
TABLET ORAL
COMMUNITY
Start: 2020-11-05

## 2020-11-20 RX ORDER — FLUCONAZOLE 150 MG/1
150 TABLET ORAL ONCE
Qty: 1 TABLET | Refills: 0 | Status: SHIPPED | OUTPATIENT
Start: 2020-11-20 | End: 2020-11-20

## 2020-11-20 RX ORDER — ZOLPIDEM TARTRATE 10 MG/1
10 TABLET ORAL
Qty: 30 TABLET | Refills: 0 | Status: SHIPPED | OUTPATIENT
Start: 2020-11-20 | End: 2021-01-26

## 2020-11-20 RX ORDER — METRONIDAZOLE 7.5 MG/G
1 GEL VAGINAL DAILY
Qty: 25 G | Refills: 0 | Status: SHIPPED | OUTPATIENT
Start: 2020-11-20 | End: 2020-11-25

## 2020-11-20 ASSESSMENT — ANXIETY QUESTIONNAIRES
7. FEELING AFRAID AS IF SOMETHING AWFUL MIGHT HAPPEN: SEVERAL DAYS
1. FEELING NERVOUS, ANXIOUS, OR ON EDGE: MORE THAN HALF THE DAYS
2. NOT BEING ABLE TO STOP OR CONTROL WORRYING: MORE THAN HALF THE DAYS
5. BEING SO RESTLESS THAT IT IS HARD TO SIT STILL: SEVERAL DAYS
6. BECOMING EASILY ANNOYED OR IRRITABLE: SEVERAL DAYS
GAD7 TOTAL SCORE: 10
3. WORRYING TOO MUCH ABOUT DIFFERENT THINGS: MORE THAN HALF THE DAYS

## 2020-11-20 ASSESSMENT — PATIENT HEALTH QUESTIONNAIRE - PHQ9
5. POOR APPETITE OR OVEREATING: SEVERAL DAYS
SUM OF ALL RESPONSES TO PHQ QUESTIONS 1-9: 20

## 2020-11-20 ASSESSMENT — MIFFLIN-ST. JEOR: SCORE: 1047.58

## 2020-11-20 NOTE — PROGRESS NOTES
Subjective     Jackie Rodriguez is a 55 year old female who presents to clinic today for the following health issues:    HPI   Hypothyroidism Follow-up      Since last visit, patient describes the following symptoms: Weight stable, no hair loss, no skin changes, no constipation, no loose stools    *  Nose bleeds off and on but over the last year they have progressively been getting worse, has been having them daily. Has had some issues her whole life.    *  Possible bacterial vaginosis or yeast infection. States she is having itching and burning sensation symptoms started 3 days ago. Vaginal irritation.    Sees neurology for migraines with occasional aura: [facility]   In process of weaning off topamax, weaning onto propranolol    Nosebleeds. For a long time. Right nostril. Has tried nasal saline, vaseline. Didn't help. Denies any intranasal drug use.    Gabapentin for RLS. Doesn't work great  Insomnia: ambien. Takes melatonin when she doesn't have ambien. She was taking the ambien nightly for years. She does not sleep without something. Has been taking this for years. She had run out and not been able to get in to clinic  Has attempted 5 mg and did not work. No side effects.  Does not like meds that cause grogginess the next morning   Diagnosed sleep apnea but had weight loss surgery  and she now has no issues    She has been depressed/anxious. She has passive SI but no active SI, no plans to hurt herself. She states she would not do that to her son.  Her daughter passed away a few years ago and she got . Now this summer her  . She admits she has not been taking care of herself.  She has been depressed especially. She is not sleeping well.  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 Next week she is starting a 1 month program near [place] (she is not sure, maybe [place] in the name) - that is for her grief. She is unclear in what type of program this is.  "There are therapists but they do not do medications.  She has not been on antidepressants in the past but thinks this would be helpful.    Does not take any vitamins  Not sure which thyroid dose she is taking. When asked, she is \"trying to\" take it daily      Review of Systems   Other than noted above, general, HEENT, respiratory, cardiac, MS, and gastrointestinal systems are negative.       Objective    /74   Pulse 75   Temp 98.6  F (37  C) (Tympanic)   Resp 16   Ht 1.55 m (5' 1.02\")   Wt 52 kg (114 lb 9.6 oz)   SpO2 100%   BMI 21.64 kg/m    Body mass index is 21.64 kg/m .  Physical Exam   GENERAL: healthy, alert and no distress  HENT: nose and mouth without ulcers or lesions , nasal mucosa scabbed inside right nostril  NECK: no adenopathy, no asymmetry, masses, or scars and thyroid normal to palpation  RESP: lungs clear to auscultation - no rales, rhonchi or wheezes  CV: regular rate and rhythm, normal S1 S2, no S3 or S4, no murmur, click or rub, no peripheral edema and peripheral pulses strong  ABDOMEN: soft, nontender, no hepatosplenomegaly, no masses and bowel sounds normal  MS: no gross musculoskeletal defects noted, no edema  PSYCH: Alert and oriented times 3; speech- coherent , normal rate and volume; able to articulate logical thoughts, able to abstract reason, no tangential thoughts, no hallucinations or delusions, affect- flat, quiet        Assessment & Plan     ASSESSMENT/PLAN:      ICD-10-CM    1. Hypothyroidism, unspecified type  E03.9 TSH with free T4 reflex   2. Vaginal itching  N89.8 Wet prep     Iron and iron binding capacity   3. Persistent disorder of initiating or maintaining sleep  G47.00 zolpidem (AMBIEN) 10 MG tablet   4. Restless legs syndrome  G25.81 gabapentin (NEURONTIN) 100 MG capsule   5. Postsurgical malabsorption, not elsewhere classified   K91.2 Ferritin     CBC with platelets differential     Comprehensive metabolic panel     Vitamin D Deficiency     Vitamin B12     " Lipid panel reflex to direct LDL Fasting   6. BV (bacterial vaginosis)  N76.0 metroNIDAZOLE (METROGEL) 0.75 % vaginal gel    B96.89    7. Yeast infection of the vagina  B37.3 fluconazole (DIFLUCAN) 150 MG tablet   8. Epistaxis  R04.0 OTOLARYNGOLOGY REFERRAL   9. Adjustment disorder with depressed mood  F43.21 sertraline (ZOLOFT) 50 MG tablet   10. Need for prophylactic vaccination and inoculation against influenza  Z23 INFLUENZA QUAD, RECOMBINANT, P-FREE (RIV4) (FLUBLOCK) [93051]   11. Anemia, unspecified type  D64.9 Reticulocyte count     CANCELED: Ferritin     CANCELED: Iron and iron binding capacity   12. Migraine with aura and without status migrainosus, not intractable  G43.109      New patient to me, Patient arrived late with multiple concerns. Did address her concerns today.  - she is very unclear about her thyroid medication. She will check which dose she has at home, if 50 or 100 mcg, but also is vague about how often she is taking it.  - will treat for BV and yeast  - referral to ENT for epistaxis  - will check yearly labs, history of bariatric surgery  - RLS uncontrolled, increase gabapentin to 200 mg at night. Refilled ambien x1. She declines sleep referral today.  - at the end of the visit she brings up depression. She is going through grief. She did relapse this summer but is now sober. She is starting a treatment/retreat program next week but is vague about this as well. She will be there for 1 month. She has passive SI but is adamant she would not hurt herself. She is willing to trial SSRI. We discussed risks and benefits.  Did schedule virtual visit with her PCP for close follow up.    Patient Instructions   Treat vaginal infection  Follow up if not improving    Start low dose sertraline for anxiety and depression  Virtual visit with Dr. Lindsey in a couple weeks    Increase gabapentin to 200 mg at night  Consider sleep clinic referral    Return in about 18 days (around 12/8/2020).    Jennifer Cherry,  GABRIELLE BUCK [facility]

## 2020-11-20 NOTE — PATIENT INSTRUCTIONS
Treat vaginal infection  Follow up if not improving    Start low dose sertraline for anxiety and depression  Virtual visit with Dr. Lindsey in a couple weeks    Increase gabapentin to 200 mg at night  Consider sleep clinic referral   blood glucose testing

## 2020-11-21 ASSESSMENT — ANXIETY QUESTIONNAIRES: GAD7 TOTAL SCORE: 10

## 2020-11-23 LAB — DEPRECATED CALCIDIOL+CALCIFEROL SERPL-MC: 10 UG/L (ref 20–75)

## 2020-11-30 DIAGNOSIS — E03.9 HYPOTHYROIDISM, UNSPECIFIED TYPE: Primary | ICD-10-CM

## 2020-12-03 RX ORDER — LEVOTHYROXINE SODIUM 100 UG/1
100 TABLET ORAL DAILY
Qty: 90 TABLET | Refills: 0 | Status: SHIPPED | OUTPATIENT
Start: 2020-12-03 | End: 2021-01-26

## 2020-12-03 NOTE — TELEPHONE ENCOUNTER
Patient reports that she is currently taking levothyroxine 100 mcg. She is currently on her way to treatment in [place] but will still have visit on 12/8/20.  Poonam Tobar RN

## 2020-12-15 NOTE — TELEPHONE ENCOUNTER
levothyroxine (SYNTHROID/LEVOTHROID) 100 MCG tablet 90 tablet 0 12/3/2020  No   Sig - Route: Take 1 tablet (100 mcg) by mouth daily - Oral   Sent to pharmacy as: Levothyroxine Sodium 100 MCG Oral Tablet (SYNTHROID/LEVOTHROID)   Class: E-Prescribe   Order: [id number]   E-Prescribing Status: Receipt confirmed by pharmacy (12/3/2020 12:56 PM CST)   Printout Tracking    External Result Report   Pharmacy    [id number] - [facility] [address] AT [facility]

## 2021-01-26 ENCOUNTER — OFFICE VISIT (OUTPATIENT)
Dept: FAMILY MEDICINE | Facility: CLINIC | Age: 56
End: 2021-01-26
Payer: MEDICARE

## 2021-01-26 VITALS
WEIGHT: 117.2 LBS | HEART RATE: 76 BPM | HEIGHT: 62 IN | TEMPERATURE: 98 F | BODY MASS INDEX: 21.57 KG/M2 | SYSTOLIC BLOOD PRESSURE: 99 MMHG | DIASTOLIC BLOOD PRESSURE: 64 MMHG

## 2021-01-26 DIAGNOSIS — F41.1 GAD (GENERALIZED ANXIETY DISORDER): Primary | ICD-10-CM

## 2021-01-26 DIAGNOSIS — F43.21 ADJUSTMENT DISORDER WITH DEPRESSED MOOD: ICD-10-CM

## 2021-01-26 DIAGNOSIS — R79.0 LOW FERRITIN LEVEL: ICD-10-CM

## 2021-01-26 DIAGNOSIS — E03.9 HYPOTHYROIDISM, UNSPECIFIED TYPE: ICD-10-CM

## 2021-01-26 DIAGNOSIS — G47.00 PERSISTENT DISORDER OF INITIATING OR MAINTAINING SLEEP: ICD-10-CM

## 2021-01-26 DIAGNOSIS — M48.02 CERVICAL SPINAL STENOSIS: ICD-10-CM

## 2021-01-26 DIAGNOSIS — G25.81 RESTLESS LEGS SYNDROME: ICD-10-CM

## 2021-01-26 DIAGNOSIS — G56.23 LESIONS OF BOTH ULNAR NERVES: ICD-10-CM

## 2021-01-26 DIAGNOSIS — M75.41 IMPINGEMENT SYNDROME, SHOULDER, RIGHT: ICD-10-CM

## 2021-01-26 LAB
T4 FREE SERPL-MCNC: 1.04 NG/DL (ref 0.76–1.46)
TSH SERPL DL<=0.005 MIU/L-ACNC: 11.56 MU/L (ref 0.4–4)

## 2021-01-26 PROCEDURE — 84443 ASSAY THYROID STIM HORMONE: CPT | Performed by: FAMILY MEDICINE

## 2021-01-26 PROCEDURE — 36415 COLL VENOUS BLD VENIPUNCTURE: CPT | Performed by: FAMILY MEDICINE

## 2021-01-26 PROCEDURE — 84439 ASSAY OF FREE THYROXINE: CPT | Performed by: FAMILY MEDICINE

## 2021-01-26 PROCEDURE — 99215 OFFICE O/P EST HI 40 MIN: CPT | Performed by: FAMILY MEDICINE

## 2021-01-26 RX ORDER — ZOLPIDEM TARTRATE 10 MG/1
10 TABLET ORAL
Qty: 30 TABLET | Refills: 0 | Status: SHIPPED | OUTPATIENT
Start: 2021-01-26 | End: 2021-02-17

## 2021-01-26 RX ORDER — GABAPENTIN 100 MG/1
200 CAPSULE ORAL AT BEDTIME
Qty: 180 CAPSULE | Refills: 0 | Status: SHIPPED | OUTPATIENT
Start: 2021-01-26 | End: 2021-01-30

## 2021-01-26 RX ORDER — FERROUS SULFATE 325(65) MG
325 TABLET ORAL
Qty: 90 TABLET | Refills: 1 | Status: SHIPPED | OUTPATIENT
Start: 2021-01-26 | End: 2021-06-03

## 2021-01-26 RX ORDER — TRAMADOL HYDROCHLORIDE 50 MG/1
TABLET ORAL
Qty: 20 TABLET | Refills: 0 | Status: SHIPPED | OUTPATIENT
Start: 2021-01-26 | End: 2021-11-10

## 2021-01-26 RX ORDER — SERTRALINE HYDROCHLORIDE 100 MG/1
100 TABLET, FILM COATED ORAL DAILY
Qty: 90 TABLET | Refills: 0 | Status: SHIPPED | OUTPATIENT
Start: 2021-01-26 | End: 2021-02-17

## 2021-01-26 RX ORDER — LEVOTHYROXINE SODIUM 100 UG/1
100 TABLET ORAL DAILY
Qty: 90 TABLET | Refills: 0 | Status: SHIPPED | OUTPATIENT
Start: 2021-01-26 | End: 2021-04-04

## 2021-01-26 ASSESSMENT — ANXIETY QUESTIONNAIRES
6. BECOMING EASILY ANNOYED OR IRRITABLE: SEVERAL DAYS
5. BEING SO RESTLESS THAT IT IS HARD TO SIT STILL: NEARLY EVERY DAY
1. FEELING NERVOUS, ANXIOUS, OR ON EDGE: SEVERAL DAYS
3. WORRYING TOO MUCH ABOUT DIFFERENT THINGS: SEVERAL DAYS
GAD7 TOTAL SCORE: 10
7. FEELING AFRAID AS IF SOMETHING AWFUL MIGHT HAPPEN: SEVERAL DAYS
2. NOT BEING ABLE TO STOP OR CONTROL WORRYING: SEVERAL DAYS

## 2021-01-26 ASSESSMENT — PATIENT HEALTH QUESTIONNAIRE - PHQ9
SUM OF ALL RESPONSES TO PHQ QUESTIONS 1-9: 10
5. POOR APPETITE OR OVEREATING: MORE THAN HALF THE DAYS

## 2021-01-26 ASSESSMENT — MIFFLIN-ST. JEOR: SCORE: 1070.9

## 2021-01-26 NOTE — PROGRESS NOTES
Assessment & Plan     JOHN (generalized anxiety disorder)  Discussed with her. Will increase the zoloft from 50 to 100. Recheck in 6 weeks. All questions answered. The patient indicates understanding of these issues and agrees with the plan.   - sertraline (ZOLOFT) 100 MG tablet; Take 1 tablet (100 mg) by mouth daily    Restless legs syndrome  Reviewed epic. Ferritin was low last fall. She is not taking iron.I will have her start daily iron with vit c. Recheck in 2 months. The patient indicates understanding of these issues and agrees with the plan.   - gabapentin (NEURONTIN) 100 MG capsule; Take 2 capsules (200 mg) by mouth At Bedtime  - ferrous sulfate (FEROSUL) 325 (65 Fe) MG tablet; Take 1 tablet (325 mg) by mouth daily (with breakfast)  - **Ferritin FUTURE 2mo; Future  - CBC with platelets; Future    Persistent disorder of initiating or maintaining sleep  I recommended she drop dose to 5mg but she doesn't want to do that.  Consider readdressing once RLS is under better control.   - zolpidem (AMBIEN) 10 MG tablet; Take 1 tablet (10 mg) by mouth nightly as needed for sleep    Adjustment disorder with depressed mood  Increasing sertraline today   - sertraline (ZOLOFT) 100 MG tablet; Take 1 tablet (100 mg) by mouth daily    Impingement syndrome, shoulder, right  Very rare use of tramadol.   - traMADol (ULTRAM) 50 MG tablet; Once daily prn pain    Hypothyroidism, unspecified type  Needs recheck before I refill.  Discussed   - TSH with free T4 reflex    Lesions of both ulnar nerves      Cervical spinal stenosis C5-6, C6-7 discs and foraminal stenosis      Low ferritin level  Discussed with her.   - **Ferritin FUTURE 2mo; Future  - CBC with platelets; Future              57 minutes spent on the date of the encounter doing chart review, review of test results, interpretation of tests, patient visit and documentation     Return in about 6 weeks (around 3/9/2021) for Depression/Anxiety follow-up.    Nieves Welch,  "MD BUCK [facility] RADHA Sadler is a 55 year old who presents to clinic today for the following health issues   - multiple issues, see below       Depression and anxiety    - taking 50mg sertraline and feels it is working. Less anxiety. No si/hi   Interested in seeing if it can work a little better      last summer   Daughter  5 years ago      RLS - symptoms are worsening   Doesn't feel the gabapentin is working  well     Reviewing her chart, ferritin was 6 on  2020     headaches  Gabapentin- doing the 2 capsules but her script says one daily.   Patient said needs a new script   It helps       Thyroid  tsh high and t4 low in 20  Was accidentally taking 50, switched back to 100mg since then    Chronic pain - \"nerve damage in arms\"  Takes very rare tramadol for this  Tramadol - rare use, maybe once a week  An prescription of #20 lasts most of the winter   Last filled 3/24/2020  Checked [place]n PNP    Insomnia  Using ambien nightly at 10mg  Says the 5mg dose doesn't work well       Review of Systems   Constitutional, HEENT, cardiovascular, pulmonary, gi and gu systems are negative, except as otherwise noted.      Objective    BP 99/64   Pulse 76   Temp 98  F (36.7  C) (Tympanic)   Ht 1.568 m (5' 1.75\")   Wt 53.2 kg (117 lb 3.2 oz)   BMI 21.61 kg/m    Body mass index is 21.61 kg/m .  Physical Exam   GENERAL: healthy, alert and no distress  NEURO: Normal strength and tone, mentation intact and speech normal  PSYCH: mentation appears normal, affect normal/bright        PHQ 2021   PHQ-9 Total Score 10   Q9: Thoughts of better off dead/self-harm past 2 weeks Not at all     "

## 2021-01-26 NOTE — LETTER
February 8, 2021      Jackie KYLEE [address]  [address] [address]        Dear ,    We are writing to inform you of your test results.    The thyroid labwork is much better. I'd like to see you stay on this dose and recheck the labs in a few more months. I'll send in a refill.    Thanks, CONNIE Welch MD ( formerly Jenn)/am    Resulted Orders   TSH with free T4 reflex   Result Value Ref Range    TSH 11.56 (H) 0.40 - 4.00 mU/L   T4 free   Result Value Ref Range    T4 Free 1.04 0.76 - 1.46 ng/dL

## 2021-01-26 NOTE — PATIENT INSTRUCTIONS
Depression/anxiety  Increase sertraline from 50 to 100 ( go to 75 for a few days)    Restless legs  Start taking daily iron with vit c  Recheck labs in 2 months - future orders are in - lab only appointment     Chronic arm pain - refill of tramadol sent     Stay on the gabapentin at 200mg for the headaches           https://[place].gov/covid[place]/vaccine/find-vaccine/index.jsp

## 2021-01-27 ASSESSMENT — ANXIETY QUESTIONNAIRES: GAD7 TOTAL SCORE: 10

## 2021-01-29 DIAGNOSIS — G25.81 RESTLESS LEGS SYNDROME: ICD-10-CM

## 2021-01-30 RX ORDER — GABAPENTIN 100 MG/1
CAPSULE ORAL
Qty: 180 CAPSULE | Refills: 0 | Status: SHIPPED | OUTPATIENT
Start: 2021-01-30 | End: 2021-02-17

## 2021-02-17 ENCOUNTER — MYC REFILL (OUTPATIENT)
Dept: FAMILY MEDICINE | Facility: CLINIC | Age: 56
End: 2021-02-17

## 2021-02-17 DIAGNOSIS — F43.21 ADJUSTMENT DISORDER WITH DEPRESSED MOOD: ICD-10-CM

## 2021-02-17 DIAGNOSIS — F41.1 GAD (GENERALIZED ANXIETY DISORDER): ICD-10-CM

## 2021-02-17 DIAGNOSIS — G47.00 PERSISTENT DISORDER OF INITIATING OR MAINTAINING SLEEP: ICD-10-CM

## 2021-02-17 DIAGNOSIS — G25.81 RESTLESS LEGS SYNDROME: ICD-10-CM

## 2021-02-17 RX ORDER — SERTRALINE HYDROCHLORIDE 100 MG/1
100 TABLET, FILM COATED ORAL DAILY
Qty: 90 TABLET | Refills: 0 | Status: SHIPPED | OUTPATIENT
Start: 2021-02-17 | End: 2021-06-03

## 2021-02-17 RX ORDER — ZOLPIDEM TARTRATE 10 MG/1
10 TABLET ORAL
Qty: 30 TABLET | Refills: 0 | Status: SHIPPED | OUTPATIENT
Start: 2021-02-17 | End: 2021-03-30

## 2021-02-17 NOTE — TELEPHONE ENCOUNTER
Requested Prescriptions   Pending Prescriptions Disp Refills     gabapentin (NEURONTIN) 100 MG capsule 180 capsule 0       There is no refill protocol information for this order        Last Written Prescription Date: 1/30/21  Last Fill Quantity: 180,  # refills: 0   Last office visit:1/26/21with prescribing provider.    Routing refill request to provider for review/approval because:  Drug not on the [facility] refill protocol   Toya VALLE-RN  Triage Nurse  [facility]: [facility]

## 2021-02-18 RX ORDER — GABAPENTIN 100 MG/1
100 CAPSULE ORAL AT BEDTIME
Qty: 180 CAPSULE | Refills: 0 | Status: SHIPPED | OUTPATIENT
Start: 2021-02-18 | End: 2021-11-10

## 2021-03-12 ENCOUNTER — NURSE TRIAGE (OUTPATIENT)
Dept: FAMILY MEDICINE | Facility: CLINIC | Age: 56
End: 2021-03-12

## 2021-03-12 NOTE — TELEPHONE ENCOUNTER
Reason for call:  Patient reporting a symptom    Symptom or request: Rash on back and stomach area, they are red and a little raised she said there is about 25 of them.     Duration (how long have symptoms been present): just noticed    Have you been treated for this before? No  Phone Number patient can be reached at:  Home number on file [phone] (home)    Best Time:  any    Can we leave a detailed message on this number:  YES    Call taken on 3/12/2021 at 3:16 PM by Summer Steward

## 2021-03-12 NOTE — TELEPHONE ENCOUNTER
Additional Information    Negative: Sounds like a life-threatening emergency to the triager    Negative: Possible contact with poison ivy or oak    Negative: Insect bite(s) suspected    Negative: Athlete's Foot suspected (i.e., itchy rash between the toes)    Negative: Jock Itch suspected (i.e., itchy rash on inner thighs near genital area)    Negative: Wound infection suspected (i.e., pain, spreading redness, or pus; in a cut, puncture, scrape or sutured wound)    Negative: Rash of external female genital area (vulva)    Negative: Rash of penis or scrotum    Negative: Small spot, skin growth, or mole    Negative: Painful rash with multiple small blisters grouped together (i.e., dermatomal distribution or 'band' or 'stripe')    Negative: Localized rash is very painful (no fever)    Negative: Localized purple or blood-colored spots or dots that are not from injury or friction (no fever)    Negative: Lyme disease suspected (e.g., bull's-eye rash or tick bite / exposure)    Negative: Patient wants to be seen    Negative: Looks like a boil, infected sore, deep ulcer, or other infected rash (spreading redness, pus)    Negative: Fever and localized purple or blood-colored spots or dots that are not from injury or friction    Negative: Fever and localized rash is very painful    Negative: Patient sounds very sick or weak to the triager    Negative: Tender bumps in armpits    Negative: Pimples (localized) and no improvement after using CARE ADVICE    Negative: SEVERE local itching persists after 2 days of steroid cream    Negative: Applying cream or ointment and it causes severe itch, burning, or pain    Negative: Red, moist, irritated area between skin folds (or under larger breasts)    Negative: Localized area of skin darkening or thickening on lower legs or ankles and has NOT been evaluated by a doctor (or NP/PA)    Negative: Localized rash present > 7 days    Pimples (localized)    Mild localized rash    Answer  "Assessment - Initial Assessment Questions  1. APPEARANCE of RASH: \"Describe the rash.\"       Randomly on my stomach, chest and back  2. LOCATION: \"Where is the rash located?\"       No it's not a rash  3. NUMBER: \"How many spots are there?\"       \"It's red random spots.\"  4. SIZE: \"How big are the spots?\" (Inches, centimeters or compare to size of a coin)       Seize of freckles  5. ONSET: \"When did the rash start?\"       First noticed this today  6. ITCHING: \"Does the rash itch?\" If so, ask: \"How bad is the itch?\"  (Scale 1-10; or mild, moderate, severe)      No itching  7. PAIN: \"Does the rash hurt?\" If so, ask: \"How bad is the pain?\"  (Scale 1-10; or mild, moderate, severe)      Denies  8. OTHER SYMPTOMS: \"Do you have any other symptoms?\" (e.g., fever)      No  9. PREGNANCY: \"Is there any chance you are pregnant?\" \"When was your last menstrual period?\"      -    Protocols used: RASH OR REDNESS - [id number]  Red raised spots.  No new medications (including OTC).   Asked if she is using any new soaps \"maybe.\" Suave body wash, she has used Suave   Just used it for the first time today.    Recommendations: Home care advice reviewed, including to discontinue use.    Ange CORTEZ RN, BSN      "

## 2021-03-30 ENCOUNTER — OFFICE VISIT (OUTPATIENT)
Dept: FAMILY MEDICINE | Facility: CLINIC | Age: 56
End: 2021-03-30
Payer: MEDICARE

## 2021-03-30 VITALS
BODY MASS INDEX: 22.5 KG/M2 | WEIGHT: 122.25 LBS | TEMPERATURE: 98.7 F | SYSTOLIC BLOOD PRESSURE: 118 MMHG | HEIGHT: 62 IN | HEART RATE: 76 BPM | DIASTOLIC BLOOD PRESSURE: 78 MMHG | RESPIRATION RATE: 16 BRPM

## 2021-03-30 DIAGNOSIS — E03.9 HYPOTHYROIDISM, UNSPECIFIED TYPE: ICD-10-CM

## 2021-03-30 DIAGNOSIS — R79.0 LOW FERRITIN LEVEL: ICD-10-CM

## 2021-03-30 DIAGNOSIS — R74.8 ELEVATED LIVER ENZYMES: ICD-10-CM

## 2021-03-30 DIAGNOSIS — G25.81 RESTLESS LEG SYNDROME: Primary | ICD-10-CM

## 2021-03-30 DIAGNOSIS — G47.00 PERSISTENT DISORDER OF INITIATING OR MAINTAINING SLEEP: ICD-10-CM

## 2021-03-30 LAB
ALBUMIN SERPL-MCNC: 3.6 G/DL (ref 3.4–5)
ALP SERPL-CCNC: 225 U/L (ref 40–150)
ALT SERPL W P-5'-P-CCNC: 81 U/L (ref 0–50)
ANION GAP SERPL CALCULATED.3IONS-SCNC: 5 MMOL/L (ref 3–14)
AST SERPL W P-5'-P-CCNC: 58 U/L (ref 0–45)
BILIRUB SERPL-MCNC: 0.2 MG/DL (ref 0.2–1.3)
BUN SERPL-MCNC: 14 MG/DL (ref 7–30)
CALCIUM SERPL-MCNC: 8.8 MG/DL (ref 8.5–10.1)
CHLORIDE SERPL-SCNC: 106 MMOL/L (ref 94–109)
CO2 SERPL-SCNC: 27 MMOL/L (ref 20–32)
CREAT SERPL-MCNC: 0.98 MG/DL (ref 0.52–1.04)
ERYTHROCYTE [DISTWIDTH] IN BLOOD BY AUTOMATED COUNT: 21.3 % (ref 10–15)
FERRITIN SERPL-MCNC: 15 NG/ML (ref 8–252)
GFR SERPL CREATININE-BSD FRML MDRD: 64 ML/MIN/{1.73_M2}
GLUCOSE SERPL-MCNC: 87 MG/DL (ref 70–99)
HCT VFR BLD AUTO: 39.5 % (ref 35–47)
HGB BLD-MCNC: 11.9 G/DL (ref 11.7–15.7)
IRON SATN MFR SERPL: 22 % (ref 15–46)
IRON SERPL-MCNC: 111 UG/DL (ref 35–180)
MAGNESIUM SERPL-MCNC: 2.4 MG/DL (ref 1.6–2.3)
MCH RBC QN AUTO: 24.3 PG (ref 26.5–33)
MCHC RBC AUTO-ENTMCNC: 30.1 G/DL (ref 31.5–36.5)
MCV RBC AUTO: 81 FL (ref 78–100)
PLATELET # BLD AUTO: 272 10E9/L (ref 150–450)
POTASSIUM SERPL-SCNC: 3.8 MMOL/L (ref 3.4–5.3)
PROT SERPL-MCNC: 7.2 G/DL (ref 6.8–8.8)
RBC # BLD AUTO: 4.89 10E12/L (ref 3.8–5.2)
SODIUM SERPL-SCNC: 138 MMOL/L (ref 133–144)
TIBC SERPL-MCNC: 507 UG/DL (ref 240–430)
TSH SERPL DL<=0.005 MIU/L-ACNC: 46.22 MU/L (ref 0.4–4)
WBC # BLD AUTO: 6.3 10E9/L (ref 4–11)

## 2021-03-30 PROCEDURE — 83735 ASSAY OF MAGNESIUM: CPT | Performed by: FAMILY MEDICINE

## 2021-03-30 PROCEDURE — 83550 IRON BINDING TEST: CPT | Performed by: FAMILY MEDICINE

## 2021-03-30 PROCEDURE — 82728 ASSAY OF FERRITIN: CPT | Performed by: FAMILY MEDICINE

## 2021-03-30 PROCEDURE — 85027 COMPLETE CBC AUTOMATED: CPT | Performed by: FAMILY MEDICINE

## 2021-03-30 PROCEDURE — 83540 ASSAY OF IRON: CPT | Performed by: FAMILY MEDICINE

## 2021-03-30 PROCEDURE — 36415 COLL VENOUS BLD VENIPUNCTURE: CPT | Performed by: FAMILY MEDICINE

## 2021-03-30 PROCEDURE — 84443 ASSAY THYROID STIM HORMONE: CPT | Performed by: FAMILY MEDICINE

## 2021-03-30 PROCEDURE — 80053 COMPREHEN METABOLIC PANEL: CPT | Performed by: FAMILY MEDICINE

## 2021-03-30 PROCEDURE — 99214 OFFICE O/P EST MOD 30 MIN: CPT | Performed by: FAMILY MEDICINE

## 2021-03-30 RX ORDER — ZOLPIDEM TARTRATE 10 MG/1
10 TABLET ORAL
Qty: 30 TABLET | Refills: 0 | Status: CANCELLED | OUTPATIENT
Start: 2021-03-30

## 2021-03-30 RX ORDER — ZOLPIDEM TARTRATE 10 MG/1
10 TABLET ORAL
Qty: 30 TABLET | Refills: 0 | Status: SHIPPED | OUTPATIENT
Start: 2021-03-30 | End: 2021-06-03

## 2021-03-30 RX ORDER — PRAMIPEXOLE DIHYDROCHLORIDE 0.12 MG/1
TABLET ORAL
Qty: 173 TABLET | Refills: 0 | Status: SHIPPED | OUTPATIENT
Start: 2021-03-30 | End: 2021-11-10

## 2021-03-30 ASSESSMENT — MIFFLIN-ST. JEOR: SCORE: 1097.77

## 2021-04-01 ENCOUNTER — TELEPHONE (OUTPATIENT)
Dept: FAMILY MEDICINE | Facility: CLINIC | Age: 56
End: 2021-04-01

## 2021-04-01 DIAGNOSIS — E03.9 HYPOTHYROIDISM, UNSPECIFIED TYPE: Primary | ICD-10-CM

## 2021-04-01 RX ORDER — LEVOTHYROXINE SODIUM 125 UG/1
125 TABLET ORAL DAILY
Qty: 90 TABLET | Refills: 0 | Status: SHIPPED | OUTPATIENT
Start: 2021-04-01 | End: 2021-11-10

## 2021-04-01 NOTE — LETTER
[facility]  [address]  [place] [address]  Phone: [phone]      April 1, 2021    Jackie Rodriguez  [address] [address]          Dear Jackie Rodriguez    As part of [facility]'s commitment to health and wellness, we inform our patients when records indicate the need for specific health screening.  It is time for you to schedule the following:       - Physical       -- Mammogram.  Please call one of the following numbers to schedule:  [facility] [phone]  [facility] [phone]  [facility] [phone]  [facility] [phone]  [facility] [phone]        To schedule an appointment with a [facility] physician, or nurse practitioner, please call:   [facility] at [phone]    NOTE:  Please disregard this notice if you have had this procedure repeated or already made an appointment.        Sincerely,      [facility] Care Team

## 2021-04-01 NOTE — TELEPHONE ENCOUNTER
Panel Management Review      Patient has the following on her problem list: None      Composite cancer screening  Chart review shows that this patient is due/due soon for the following Mammogram  Summary:    Patient is due/failing the following:   MAMMOGRAM and PHYSICAL    Action needed:   Patient needs office visit for a medicare physical.  Schedule mammogram   Type of outreach:    Sent letter.    Questions for provider review:    None                                                                                                                                    Danni Jimenez LPN       Chart routed to none .

## 2021-04-03 ENCOUNTER — HEALTH MAINTENANCE LETTER (OUTPATIENT)
Age: 56
End: 2021-04-03

## 2021-04-04 NOTE — PROGRESS NOTES
Assessment/Plan:    Jackie Rodriguez is a 55 year old female presenting for:    Restless leg syndrome  Mirapex sent to the pharmacy.  Discussed with benzodiazepines would not be a good choice for the patient and she is in agreement with the plan.  Discussed that she should use 1 tablet daily for a week and then can increase to 2 tablets daily if needed.  Will contact me via LAVEGO in 2 to 3 weeks to let me know if this is helping.  - pramipexole (MIRAPEX) 0.125 MG tablet  Dispense: 173 tablet; Refill: 0    Hypothyroidism, unspecified type  Repeat TSH level today is elevated at 46.  Patient states that she has been taking her medication as prescribed and thus a slightly increased dose sent to the pharmacy.  I did put in an order for her to get this rechecked in about 6 weeks.  This can be a lab only appointment or I am happy to see her in clinic.  - TSH    Low ferritin level  Unclear if this is potentially partially because of her restless leg syndrome.  She has been incorporating more iron in her diet.  Labs will be drawn.  - Ferritin  - Iron and iron binding capacity  - CBC with platelets  - Magnesium    Persistent disorder of initiating or maintaining sleep  Refill Ambien sent to the pharmacy.  Discussed that this should generally come from her primary care physician.  - zolpidem (AMBIEN) 10 MG tablet  Dispense: 30 tablet; Refill: 0    Elevated liver enzymes  Recheck LFTs.  Patient states that she is for the most part avoiding alcohol and any medications that might affect her liver.  - Comprehensive metabolic panel (BMP + Alb, Alk Phos, ALT, AST, Total. Bili, TP)      Medications Discontinued During This Encounter   Medication Reason     sertraline (ZOLOFT) 50 MG tablet      zolpidem (AMBIEN) 10 MG tablet Reorder           Chief Complaint:  Labs Only and Recheck Medication        Subjective:   Jackie Rodriguez is a pleasant 55-year-old female presenting to the clinic today for a myriad of concerns.    1.   "Restless leg syndrome: Patient notes that at night she had a difficult time sleeping due to her restless legs.  She will wake up several times at night due to this.  She is frustrated regarding her restless legs given that she notes that it is adversely affecting her life.  She states that she has tried \"many medications\" for this.  She is currently taking tizanidine to help with this but it has not been very beneficial for her.  She has never tried Mirapex that she can think of.  She mentions that one of her friends is on clonazepam at night to help with her restless legs and she states that it works well for her friend.     2.  Hypothyroidism: Patient has a history of hypothyroidism.  She is currently taking levothyroxine 100 mcg daily.  Prior to being on that medication and her TSH was in the 90s.  Last TSH was about 10.  She denies any overt symptoms of hypo or hyperthyroidism.  She does note that she is fairly frequently tired.    3.  Low ferritin level: Iron levels were low back in November.  She has been increasing iron in her diet.  It was thought that maybe this would help with her restless leg syndrome but it does not seem to have done so.  She is hopeful to get her levels rechecked today.    #4.  Insomnia: Patient would like a refill of her Ambien.  She is been on this medication for quite some time.    5.  Liver enzyme elevation: This was not discussed in depth today.  She just notes that she needs a recheck today.  She notes that this has been somewhat high in the past and it is being followed to ensure that it is not increasing.    12 point review of systems completed and negative except for what has been described above    History   Smoking Status     Former Smoker     Packs/day: 0.50     Quit date: 6/28/2008   Smokeless Tobacco     Never Used         Current Outpatient Medications:      ferrous sulfate (FEROSUL) 325 (65 Fe) MG tablet, Take 1 tablet (325 mg) by mouth daily (with breakfast), Disp: 90 " "tablet, Rfl: 1     gabapentin (NEURONTIN) 100 MG capsule, Take 1 capsule (100 mg) by mouth At Bedtime, Disp: 180 capsule, Rfl: 0     levothyroxine (SYNTHROID/LEVOTHROID) 100 MCG tablet, Take 1 tablet (100 mcg) by mouth daily, Disp: 90 tablet, Rfl: 0     omeprazole 20 MG tablet, Take 1 tablet (20 mg) by mouth daily as needed (for reflux), Disp: 30 tablet, Rfl: 3     pramipexole (MIRAPEX) 0.125 MG tablet, Take 1 tablet (0.125 mg) by mouth At Bedtime for 7 days, THEN 2 tablets (0.25 mg) At Bedtime., Disp: 173 tablet, Rfl: 0     propranolol (INDERAL) 20 MG tablet, TK 1 T PO BID, Disp: , Rfl:      sertraline (ZOLOFT) 100 MG tablet, Take 1 tablet (100 mg) by mouth daily, Disp: 90 tablet, Rfl: 0     tiZANidine (ZANAFLEX) 2 MG tablet, Take 1 mg by mouth once as needed for other (for headache), Disp: , Rfl:      topiramate (TOPAMAX) 100 MG tablet, Take 100 mg by mouth daily, Disp: , Rfl:      traMADol (ULTRAM) 50 MG tablet, Once daily prn pain, Disp: 20 tablet, Rfl: 0     zolpidem (AMBIEN) 10 MG tablet, Take 1 tablet (10 mg) by mouth nightly as needed for sleep, Disp: 30 tablet, Rfl: 0     levothyroxine (SYNTHROID/LEVOTHROID) 125 MCG tablet, Take 1 tablet (125 mcg) by mouth daily, Disp: 90 tablet, Rfl: 0      Objective:  Vitals:    03/30/21 1130   BP: 118/78   Pulse: 76   Resp: 16   Temp: 98.7  F (37.1  C)   TempSrc: Tympanic   Weight: 55.5 kg (122 lb 4 oz)   Height: 1.575 m (5' 2\")       Body mass index is 22.36 kg/m .    Vital signs reviewed and stable  General: No acute distress  Psych: Appropriate affect  HEENT: moist mucous membranes, pupils equal, round, reactive to light and accomodation, tympanic membranes are pearly grey bilaterally  Lymph: no cervical or supraclavicular lymphadenopathy  Cardiovascular: regular rate and rhythm with no murmur  Pulmonary: clear to auscultation bilaterally with no wheeze  Abdomen: soft, non tender, non distended with normo-active bowel sounds  Extremities: warm and well perfused with " no edema  Skin: warm and dry with no rash         This note has been dictated and transcribed using voice recognition software.   Any errors in transcription are unintentional and inherent to the software.

## 2021-06-03 ENCOUNTER — TELEPHONE (OUTPATIENT)
Dept: FAMILY MEDICINE | Facility: CLINIC | Age: 56
End: 2021-06-03

## 2021-06-03 ENCOUNTER — VIRTUAL VISIT (OUTPATIENT)
Dept: FAMILY MEDICINE | Facility: CLINIC | Age: 56
End: 2021-06-03
Payer: MEDICARE

## 2021-06-03 DIAGNOSIS — F41.1 GAD (GENERALIZED ANXIETY DISORDER): ICD-10-CM

## 2021-06-03 DIAGNOSIS — F43.21 ADJUSTMENT DISORDER WITH DEPRESSED MOOD: ICD-10-CM

## 2021-06-03 DIAGNOSIS — G25.81 RESTLESS LEGS SYNDROME: ICD-10-CM

## 2021-06-03 DIAGNOSIS — G47.00 PERSISTENT DISORDER OF INITIATING OR MAINTAINING SLEEP: ICD-10-CM

## 2021-06-03 DIAGNOSIS — M79.621 PAIN OF RIGHT UPPER ARM: Primary | ICD-10-CM

## 2021-06-03 PROCEDURE — 99214 OFFICE O/P EST MOD 30 MIN: CPT | Mod: 95 | Performed by: FAMILY MEDICINE

## 2021-06-03 RX ORDER — LIDOCAINE 50 MG/G
1 PATCH TOPICAL EVERY 24 HOURS
Qty: 30 PATCH | Refills: 1 | Status: SHIPPED | OUTPATIENT
Start: 2021-06-03 | End: 2021-07-02

## 2021-06-03 RX ORDER — SERTRALINE HYDROCHLORIDE 100 MG/1
100 TABLET, FILM COATED ORAL DAILY
Qty: 90 TABLET | Refills: 0 | Status: SHIPPED | OUTPATIENT
Start: 2021-06-03 | End: 2021-09-03

## 2021-06-03 RX ORDER — FERROUS SULFATE 325(65) MG
325 TABLET ORAL
Qty: 90 TABLET | Refills: 1 | Status: SHIPPED | OUTPATIENT
Start: 2021-06-03 | End: 2024-05-23

## 2021-06-03 RX ORDER — ZOLPIDEM TARTRATE 10 MG/1
10 TABLET ORAL
Qty: 30 TABLET | Refills: 0 | Status: SHIPPED | OUTPATIENT
Start: 2021-06-03 | End: 2021-10-26

## 2021-06-03 NOTE — PROGRESS NOTES
Doroteo is a 56 year old who is being evaluated via a billable video visit.      How would you like to obtain your AVS? MyChart  If the video visit is dropped, the invitation should be resent by: Send to e-mail at: [email or web address]  Will anyone else be joining your video visit? No      Video Start Time: 9:40 AM    Assessment & Plan     Pain of right upper arm    - lidocaine (LIDODERM) 5 % patch; Place 1 patch onto the skin every 24 hours To prevent lidocaine toxicity, patient should be patch free for 12 hrs daily.    JOHN (generalized anxiety disorder)    - sertraline (ZOLOFT) 100 MG tablet; Take 1 tablet (100 mg) by mouth daily    Adjustment disorder with depressed mood    - sertraline (ZOLOFT) 100 MG tablet; Take 1 tablet (100 mg) by mouth daily    Restless legs syndrome    - ferrous sulfate (FEROSUL) 325 (65 Fe) MG tablet; Take 1 tablet (325 mg) by mouth daily (with breakfast)    Persistent disorder of initiating or maintaining sleep    - zolpidem (AMBIEN) 10 MG tablet; Take 1 tablet (10 mg) by mouth nightly as needed for sleep         Patient Instructions   This is likely due to the needle when you got your shot  Try the lidocaine on this area.   If worsening over the next few weeks make an appointment in the office       Return in about 4 weeks (around 7/1/2021) for if not improving.    Kennedi Valadez MD  [facility] RADHA Sadler is a 56 year old who presents for the following health issues     HPI     Right arm 5/2/21 still painful, feels like she is punched in the arm  Hard time lifting arm is painful, arm is weak.  Has tried ice and heat ibuprofen   She already had ulnar transposition 2x both elbows   This has been bothering her for 4 weeks     She also would like refill on the ambien and her sertraline and iron  Mood is stable although the pain is dragging her down a bit    Review of Systems   Constitutional, HEENT, cardiovascular, pulmonary, gi and gu systems are negative, except  as otherwise noted.      Objective       Had the first     Vitals:  No vitals were obtained today due to virtual visit.    Physical Exam   GENERAL: Healthy, alert and no distress  EYES: Eyes grossly normal to inspection.  No discharge or erythema, or obvious scleral/conjunctival abnormalities.  RESP: No audible wheeze, cough, or visible cyanosis.  No visible retractions or increased work of breathing.    MS: No gross musculoskeletal defects noted.  Normal range of motion.  No visible edema.  SKIN: Visible skin clear. No significant rash, abnormal pigmentation or lesions.  NEURO: Cranial nerves grossly intact.  Mentation and speech appropriate for age.  PSYCH: Mentation appears normal, affect normal/bright, judgement and insight intact, normal speech and appearance well-groomed.    Office Visit on 03/30/2021   Component Date Value Ref Range Status     Sodium 03/30/2021 138  133 - 144 mmol/L Final     Potassium 03/30/2021 3.8  3.4 - 5.3 mmol/L Final     Chloride 03/30/2021 106  94 - 109 mmol/L Final     Carbon Dioxide 03/30/2021 27  20 - 32 mmol/L Final     Anion Gap 03/30/2021 5  3 - 14 mmol/L Final     Glucose 03/30/2021 87  70 - 99 mg/dL Final     Urea Nitrogen 03/30/2021 14  7 - 30 mg/dL Final     Creatinine 03/30/2021 0.98  0.52 - 1.04 mg/dL Final     GFR Estimate 03/30/2021 64  >60 mL/min/[1.73_m2] Final    Comment: Non  GFR Calc  Starting 12/18/2018, serum creatinine based estimated GFR (eGFR) will be   calculated using the Chronic Kidney Disease Epidemiology Collaboration   (CKD-EPI) equation.       GFR Estimate If Black 03/30/2021 74  >60 mL/min/[1.73_m2] Final    Comment:  GFR Calc  Starting 12/18/2018, serum creatinine based estimated GFR (eGFR) will be   calculated using the Chronic Kidney Disease Epidemiology Collaboration   (CKD-EPI) equation.       Calcium 03/30/2021 8.8  8.5 - 10.1 mg/dL Final     Bilirubin Total 03/30/2021 0.2  0.2 - 1.3 mg/dL Final     Albumin  03/30/2021 3.6  3.4 - 5.0 g/dL Final     Protein Total 03/30/2021 7.2  6.8 - 8.8 g/dL Final     Alkaline Phosphatase 03/30/2021 225* 40 - 150 U/L Final     ALT 03/30/2021 81* 0 - 50 U/L Final     AST 03/30/2021 58* 0 - 45 U/L Final     TSH 03/30/2021 46.22* 0.40 - 4.00 mU/L Final     Ferritin 03/30/2021 15  8 - 252 ng/mL Final     Iron 03/30/2021 111  35 - 180 ug/dL Final     Iron Binding Cap 03/30/2021 507* 240 - 430 ug/dL Final     Iron Saturation Index 03/30/2021 22  15 - 46 % Final     WBC 03/30/2021 6.3  4.0 - 11.0 10e9/L Final     RBC Count 03/30/2021 4.89  3.8 - 5.2 10e12/L Final     Hemoglobin 03/30/2021 11.9  11.7 - 15.7 g/dL Final     Hematocrit 03/30/2021 39.5  35.0 - 47.0 % Final     MCV 03/30/2021 81  78 - 100 fl Final     MCH 03/30/2021 24.3* 26.5 - 33.0 pg Final     MCHC 03/30/2021 30.1* 31.5 - 36.5 g/dL Final     RDW 03/30/2021 21.3* 10.0 - 15.0 % Final     Platelet Count 03/30/2021 272  150 - 450 10e9/L Final     Magnesium 03/30/2021 2.4* 1.6 - 2.3 mg/dL Final               Video-Visit Details    Type of service:  Video Visit    Video End Time:9:54 AM    Originating Location (pt. Location): Other car    Distant Location (provider location):  [facility]     Platform used for Video Visit: Miguel Valadez M.D.

## 2021-06-03 NOTE — PATIENT INSTRUCTIONS
This is likely due to the needle when you got your shot  Try the lidocaine on this area.   If worsening over the next few weeks make an appointment in the office

## 2021-06-03 NOTE — TELEPHONE ENCOUNTER
PRIOR AUTHORIZATION DENIED - COMPLETED VIA EPA     Medication: lidocaine (LIDODERM) 5 % patch - DENIED     Denial Date:  06/03/2021    Denial Rational: This medication is NOT covered for your diagnosis; Pain of right upper arm (M79.621)     This medication is covered if it is being used to treat the following:     A) Pain associated with post-herpetic neuralgia,     B) Pain associated with diabetic neuropathy,     C) Pain associated with cancer-related neuropathy (including treatment-related neuropathy [e.g., neuropathy associated with radiation treatment or chemotherapy]      Appeal Information:

## 2021-07-02 ENCOUNTER — OFFICE VISIT (OUTPATIENT)
Dept: FAMILY MEDICINE | Facility: CLINIC | Age: 56
End: 2021-07-02
Payer: MEDICARE

## 2021-07-02 ENCOUNTER — ANCILLARY PROCEDURE (OUTPATIENT)
Dept: GENERAL RADIOLOGY | Facility: CLINIC | Age: 56
End: 2021-07-02
Attending: FAMILY MEDICINE
Payer: MEDICARE

## 2021-07-02 VITALS
WEIGHT: 120.25 LBS | HEART RATE: 72 BPM | HEIGHT: 62 IN | SYSTOLIC BLOOD PRESSURE: 122 MMHG | TEMPERATURE: 97.2 F | DIASTOLIC BLOOD PRESSURE: 80 MMHG | RESPIRATION RATE: 16 BRPM | BODY MASS INDEX: 22.13 KG/M2

## 2021-07-02 DIAGNOSIS — M79.621 PAIN OF RIGHT UPPER ARM: ICD-10-CM

## 2021-07-02 DIAGNOSIS — M79.621 PAIN OF RIGHT UPPER ARM: Primary | ICD-10-CM

## 2021-07-02 PROCEDURE — 99214 OFFICE O/P EST MOD 30 MIN: CPT | Performed by: FAMILY MEDICINE

## 2021-07-02 PROCEDURE — 73060 X-RAY EXAM OF HUMERUS: CPT | Mod: RT | Performed by: RADIOLOGY

## 2021-07-02 RX ORDER — OXYCODONE HYDROCHLORIDE 5 MG/1
5 TABLET ORAL EVERY 12 HOURS PRN
Qty: 15 TABLET | Refills: 0 | Status: SHIPPED | OUTPATIENT
Start: 2021-07-02 | End: 2021-07-05

## 2021-07-02 ASSESSMENT — MIFFLIN-ST. JEOR: SCORE: 1083.7

## 2021-07-03 NOTE — PROGRESS NOTES
Assessment/Plan:    Jackie Rodriguez is a 56 year old female presenting for:    Pain of right upper arm  X-ray today was done which was negative for any fracture, bone mass or dislocation.  MRI has been ordered to further evaluate the musculature in the area.  Oxycodone sent to the pharmacy which she will use only infrequently.  - oxyCODONE (ROXICODONE) 5 MG tablet  Dispense: 15 tablet; Refill: 0  - XR Humerus Right G/E 2 Views  - MR Humerus Upper Arm Right wo Contrast      Medications Discontinued During This Encounter   Medication Reason     lidocaine (LIDODERM) 5 % patch            Chief Complaint:  Follow Up        Subjective:   Jackie Rodriguez is a very pleasant 56-year-old female who presents to the clinic today with pain in her right upper arm.    Patient had her first Moderna COVID-19 vaccination about 2 months ago.  She had this in her right arm.  About 24 hours after her immunization she noted weakness and pain in her right upper arm where she had the injection.  Patient has a chronic neuropathy on the right and has had which she believes was an ulnar release surgery as well as carpal tunnel surgery.  She does not feel though her neuropathy is necessarily worse but notes pain in the muscle both with palpation as well as with movement.  She is unable to lift her arm actively either forward or past about 40 degrees due to the pain in the injection site.    She does not notice any redness or swelling.  Again, the area is very tender to palpation.    Patient is right-handed and this is significantly affecting her life.  She continued to think that it will dissipate on its own however he has now been over 6 weeks and it has not improved.    She has been using Tylenol, ibuprofen and she had some tramadol which does not seem to help.    12 point review of systems completed and negative except for what has been described above    History   Smoking Status     Former Smoker     Packs/day: 0.50     Quit date:  "6/28/2008   Smokeless Tobacco     Never Used         Current Outpatient Medications:      ferrous sulfate (FEROSUL) 325 (65 Fe) MG tablet, Take 1 tablet (325 mg) by mouth daily (with breakfast), Disp: 90 tablet, Rfl: 1     gabapentin (NEURONTIN) 100 MG capsule, Take 1 capsule (100 mg) by mouth At Bedtime, Disp: 180 capsule, Rfl: 0     levothyroxine (SYNTHROID/LEVOTHROID) 125 MCG tablet, Take 1 tablet (125 mcg) by mouth daily, Disp: 90 tablet, Rfl: 0     omeprazole 20 MG tablet, Take 1 tablet (20 mg) by mouth daily as needed (for reflux), Disp: 30 tablet, Rfl: 3     oxyCODONE (ROXICODONE) 5 MG tablet, Take 1 tablet (5 mg) by mouth every 12 hours as needed for severe pain, Disp: 15 tablet, Rfl: 0     propranolol (INDERAL) 20 MG tablet, TK 1 T PO BID, Disp: , Rfl:      sertraline (ZOLOFT) 100 MG tablet, Take 1 tablet (100 mg) by mouth daily, Disp: 90 tablet, Rfl: 0     tiZANidine (ZANAFLEX) 2 MG tablet, Take 1 mg by mouth once as needed for other (for headache), Disp: , Rfl:      traMADol (ULTRAM) 50 MG tablet, Once daily prn pain, Disp: 20 tablet, Rfl: 0     zolpidem (AMBIEN) 10 MG tablet, Take 1 tablet (10 mg) by mouth nightly as needed for sleep, Disp: 30 tablet, Rfl: 0     pramipexole (MIRAPEX) 0.125 MG tablet, Take 1 tablet (0.125 mg) by mouth At Bedtime for 7 days, THEN 2 tablets (0.25 mg) At Bedtime., Disp: 173 tablet, Rfl: 0      Objective:  Vitals:    07/02/21 1405   BP: 122/80   Pulse: 72   Resp: 16   Temp: 97.2  F (36.2  C)   TempSrc: Tympanic   Weight: 54.5 kg (120 lb 4 oz)   Height: 1.575 m (5' 2\")       Body mass index is 21.99 kg/m .    Vital signs reviewed and stable  General: No acute distress  Psych: Appropriate affect  HEENT: moist mucous membranes  Lymph: no cervical or supraclavicular lymphadenopathy  Cardiovascular: regular rate and rhythm with no murmur  Pulmonary: clear to auscultation bilaterally with no wheeze  Abdomen: soft, non tender, non distended with normo-active bowel " sounds  Extremities: warm and well perfused with no edema  Skin: warm and dry with no rash  Musculoskeletal: Tenderness to palpation over the injection site on the right upper arm.  Patient has difficulty lifting her arm up past 50 degrees in abduction and frontal elevation.       This note has been dictated and transcribed using voice recognition software.   Any errors in transcription are unintentional and inherent to the software.

## 2021-07-06 ENCOUNTER — HOSPITAL ENCOUNTER (OUTPATIENT)
Dept: MRI IMAGING | Facility: CLINIC | Age: 56
Discharge: HOME | End: 2021-07-06
Attending: FAMILY MEDICINE | Admitting: FAMILY MEDICINE
Payer: MEDICARE

## 2021-07-06 DIAGNOSIS — M79.621 PAIN OF RIGHT UPPER ARM: ICD-10-CM

## 2021-07-06 PROCEDURE — G1004 CDSM NDSC: HCPCS | Performed by: RADIOLOGY

## 2021-07-06 PROCEDURE — 73218 MRI UPPER EXTREMITY W/O DYE: CPT | Mod: RT,MG

## 2021-07-06 PROCEDURE — 73218 MRI UPPER EXTREMITY W/O DYE: CPT | Mod: 26 | Performed by: RADIOLOGY

## 2021-09-03 DIAGNOSIS — F41.1 GAD (GENERALIZED ANXIETY DISORDER): ICD-10-CM

## 2021-09-03 DIAGNOSIS — F43.21 ADJUSTMENT DISORDER WITH DEPRESSED MOOD: ICD-10-CM

## 2021-09-03 RX ORDER — SERTRALINE HYDROCHLORIDE 100 MG/1
TABLET, FILM COATED ORAL
Qty: 90 TABLET | Refills: 0 | Status: SHIPPED | OUTPATIENT
Start: 2021-09-03 | End: 2021-11-10

## 2021-09-03 NOTE — TELEPHONE ENCOUNTER
Routing refill request to provider for review/approval because:  PHQ9: 10 on 1/2021 - [facility] with PHQ9 and GAD7 questionnaires attached for patient to update    Adam Forbes RN

## 2021-09-18 ENCOUNTER — HEALTH MAINTENANCE LETTER (OUTPATIENT)
Age: 56
End: 2021-09-18

## 2021-10-26 DIAGNOSIS — G47.00 PERSISTENT DISORDER OF INITIATING OR MAINTAINING SLEEP: ICD-10-CM

## 2021-10-26 RX ORDER — ZOLPIDEM TARTRATE 10 MG/1
TABLET ORAL
Qty: 30 TABLET | Refills: 0 | Status: SHIPPED | OUTPATIENT
Start: 2021-10-26 | End: 2022-01-10

## 2021-10-26 NOTE — TELEPHONE ENCOUNTER
I have not seen pt in years.  Looks like she saw Dr Valadez most recently for this concern.  Will forward request.    Lulu Lindsey, DO

## 2021-10-26 NOTE — TELEPHONE ENCOUNTER
Routing refill request to provider for review/approval because:  Drug not on the [facility] refill protocol   Thank you.  Adeel Wang RN

## 2021-11-10 ENCOUNTER — MYC REFILL (OUTPATIENT)
Dept: FAMILY MEDICINE | Facility: CLINIC | Age: 56
End: 2021-11-10
Payer: MEDICARE

## 2021-11-10 ENCOUNTER — MYC MEDICAL ADVICE (OUTPATIENT)
Dept: FAMILY MEDICINE | Facility: CLINIC | Age: 56
End: 2021-11-10
Payer: MEDICARE

## 2021-11-10 DIAGNOSIS — F41.1 GAD (GENERALIZED ANXIETY DISORDER): ICD-10-CM

## 2021-11-10 DIAGNOSIS — F43.21 ADJUSTMENT DISORDER WITH DEPRESSED MOOD: ICD-10-CM

## 2021-11-10 ASSESSMENT — ANXIETY QUESTIONNAIRES
2. NOT BEING ABLE TO STOP OR CONTROL WORRYING: SEVERAL DAYS
7. FEELING AFRAID AS IF SOMETHING AWFUL MIGHT HAPPEN: NOT AT ALL
7. FEELING AFRAID AS IF SOMETHING AWFUL MIGHT HAPPEN: NOT AT ALL
1. FEELING NERVOUS, ANXIOUS, OR ON EDGE: MORE THAN HALF THE DAYS
8. IF YOU CHECKED OFF ANY PROBLEMS, HOW DIFFICULT HAVE THESE MADE IT FOR YOU TO DO YOUR WORK, TAKE CARE OF THINGS AT HOME, OR GET ALONG WITH OTHER PEOPLE?: SOMEWHAT DIFFICULT
GAD7 TOTAL SCORE: 12
6. BECOMING EASILY ANNOYED OR IRRITABLE: SEVERAL DAYS
5. BEING SO RESTLESS THAT IT IS HARD TO SIT STILL: NEARLY EVERY DAY
GAD7 TOTAL SCORE: 12
GAD7 TOTAL SCORE: 12
4. TROUBLE RELAXING: NEARLY EVERY DAY
3. WORRYING TOO MUCH ABOUT DIFFERENT THINGS: MORE THAN HALF THE DAYS

## 2021-11-10 ASSESSMENT — PATIENT HEALTH QUESTIONNAIRE - PHQ9
SUM OF ALL RESPONSES TO PHQ QUESTIONS 1-9: 13
10. IF YOU CHECKED OFF ANY PROBLEMS, HOW DIFFICULT HAVE THESE PROBLEMS MADE IT FOR YOU TO DO YOUR WORK, TAKE CARE OF THINGS AT HOME, OR GET ALONG WITH OTHER PEOPLE: SOMEWHAT DIFFICULT
SUM OF ALL RESPONSES TO PHQ QUESTIONS 1-9: 13

## 2021-11-10 NOTE — TELEPHONE ENCOUNTER
Call placed to patient regarding PHQ9 alert.   PHQ: 13 and patient marked several days has thoughts of being better off dead or hurting herself in some way    Patient states she is doing okay  Has been depressed with the loss of her  and her daughter  States she has thoughts of suicide but states she does not have a plan and would never act to self harm as she has her son - states she would never want him to grieve the way she is grieving     Reports her prescribed anti-depressant is helping symptoms  Reports good support from family and friends    Will forward to Dr. Lindsey to review    Adam Forbes RN

## 2021-11-10 NOTE — TELEPHONE ENCOUNTER
Routing refill request to provider for review/approval because:  PHQ9 > 6 months ago - [employer] message sent with PHQ9 and GAD7 questionnaires attached for patient to update    Adam Forbes RN

## 2021-11-11 RX ORDER — SERTRALINE HYDROCHLORIDE 100 MG/1
100 TABLET, FILM COATED ORAL DAILY
Qty: 30 TABLET | Refills: 0 | Status: SHIPPED | OUTPATIENT
Start: 2021-11-11 | End: 2022-04-01

## 2021-11-11 ASSESSMENT — PATIENT HEALTH QUESTIONNAIRE - PHQ9: SUM OF ALL RESPONSES TO PHQ QUESTIONS 1-9: 13

## 2021-11-11 ASSESSMENT — ANXIETY QUESTIONNAIRES: GAD7 TOTAL SCORE: 12

## 2021-11-11 NOTE — TELEPHONE ENCOUNTER
Did not follow up for recheck.  Recent phq/ara were worse.   We need an appointment in person or via video.   Please let the patient know.   30 days given   CONNIE Welch MD

## 2021-11-11 NOTE — TELEPHONE ENCOUNTER
Call placed to patient    Requesting clarification on patient's PCP as patient has not been seen by Dr. Lindsey in over 4 years  Patient states her new PCP is Dr. Welch - chart updated     Adam Forbes RN

## 2021-11-12 NOTE — TELEPHONE ENCOUNTER
It appears pt has changed her PCP to Dr Welch and I have not seen her in 4 years.  Forwarded to Dr Welch to review.    Lulu Lindsey,

## 2021-11-13 ENCOUNTER — HEALTH MAINTENANCE LETTER (OUTPATIENT)
Age: 56
End: 2021-11-13

## 2022-01-09 DIAGNOSIS — G47.00 PERSISTENT DISORDER OF INITIATING OR MAINTAINING SLEEP: ICD-10-CM

## 2022-01-10 RX ORDER — ZOLPIDEM TARTRATE 10 MG/1
TABLET ORAL
Qty: 30 TABLET | OUTPATIENT
Start: 2022-01-10

## 2022-01-10 RX ORDER — ZOLPIDEM TARTRATE 10 MG/1
TABLET ORAL
Qty: 30 TABLET | Refills: 0 | Status: SHIPPED | OUTPATIENT
Start: 2022-01-10 | End: 2022-02-24

## 2022-01-10 NOTE — TELEPHONE ENCOUNTER
Routing refill request to provider for review/approval because:  Drug not on the [facility] refill protocol   Shanna Vega RN

## 2022-01-10 NOTE — TELEPHONE ENCOUNTER
Routing refill request to provider for review/approval because:  Drug not on the [facility] refill protocol     Adam Forbes RN

## 2022-02-19 DIAGNOSIS — E03.9 HYPOTHYROIDISM, UNSPECIFIED TYPE: ICD-10-CM

## 2022-02-19 RX ORDER — LEVOTHYROXINE SODIUM 125 UG/1
TABLET ORAL
Qty: 90 TABLET | Refills: 0 | Status: CANCELLED | OUTPATIENT
Start: 2022-02-19

## 2022-02-21 RX ORDER — LEVOTHYROXINE SODIUM 125 UG/1
TABLET ORAL
Qty: 30 TABLET | Refills: 0 | Status: SHIPPED | OUTPATIENT
Start: 2022-02-21 | End: 2022-02-21

## 2022-02-21 NOTE — TELEPHONE ENCOUNTER
Routing refill request to provider for review/approval because:  Labs out of range:  TSH: 46.22 on 3/2021    Adam Forbes RN

## 2022-02-21 NOTE — TELEPHONE ENCOUNTER
Scheduled for 4/1/22 with PCP,  Needs 6 weeks worth of pills to get her through until this appt. Please  Janett High on 2/21/2022 at 4:36 PM

## 2022-02-21 NOTE — TELEPHONE ENCOUNTER
She definitely needs to get tsh rechecked. Might be good to make an appointment as well - I saw her last in jan 2021.  Please call her     CONNIE Welch MD

## 2022-02-22 RX ORDER — LEVOTHYROXINE SODIUM 125 UG/1
TABLET ORAL
Qty: 60 TABLET | Refills: 0 | Status: SHIPPED | OUTPATIENT
Start: 2022-02-22 | End: 2022-05-11

## 2022-02-24 DIAGNOSIS — G47.00 PERSISTENT DISORDER OF INITIATING OR MAINTAINING SLEEP: ICD-10-CM

## 2022-02-24 RX ORDER — ZOLPIDEM TARTRATE 10 MG/1
TABLET ORAL
Qty: 30 TABLET | Refills: 0 | Status: SHIPPED | OUTPATIENT
Start: 2022-02-24 | End: 2022-04-01

## 2022-04-01 ENCOUNTER — OFFICE VISIT (OUTPATIENT)
Dept: FAMILY MEDICINE | Facility: CLINIC | Age: 57
End: 2022-04-01
Payer: MEDICARE

## 2022-04-01 VITALS
WEIGHT: 117 LBS | SYSTOLIC BLOOD PRESSURE: 125 MMHG | HEART RATE: 87 BPM | BODY MASS INDEX: 21.53 KG/M2 | OXYGEN SATURATION: 96 % | DIASTOLIC BLOOD PRESSURE: 82 MMHG | HEIGHT: 62 IN | RESPIRATION RATE: 14 BRPM | TEMPERATURE: 97.6 F

## 2022-04-01 DIAGNOSIS — R79.89 ELEVATED SERUM CREATININE: ICD-10-CM

## 2022-04-01 DIAGNOSIS — G47.09 OTHER INSOMNIA: ICD-10-CM

## 2022-04-01 DIAGNOSIS — F19.11 HISTORY OF DRUG ABUSE (H): ICD-10-CM

## 2022-04-01 DIAGNOSIS — R79.0 LOW MAGNESIUM LEVELS: ICD-10-CM

## 2022-04-01 DIAGNOSIS — F43.21 ADJUSTMENT DISORDER WITH DEPRESSED MOOD: ICD-10-CM

## 2022-04-01 DIAGNOSIS — F43.21 SITUATIONAL DEPRESSION: ICD-10-CM

## 2022-04-01 DIAGNOSIS — Z12.31 VISIT FOR SCREENING MAMMOGRAM: ICD-10-CM

## 2022-04-01 DIAGNOSIS — F41.1 GAD (GENERALIZED ANXIETY DISORDER): Primary | ICD-10-CM

## 2022-04-01 DIAGNOSIS — M75.41 IMPINGEMENT SYNDROME, SHOULDER, RIGHT: ICD-10-CM

## 2022-04-01 DIAGNOSIS — M48.02 CERVICAL SPINAL STENOSIS: ICD-10-CM

## 2022-04-01 DIAGNOSIS — G25.81 RESTLESS LEGS SYNDROME: ICD-10-CM

## 2022-04-01 DIAGNOSIS — Z23 NEED FOR VACCINATION: ICD-10-CM

## 2022-04-01 DIAGNOSIS — R79.0 LOW FERRITIN: ICD-10-CM

## 2022-04-01 DIAGNOSIS — R79.89 ELEVATED LIVER FUNCTION TESTS: ICD-10-CM

## 2022-04-01 DIAGNOSIS — E03.9 HYPOTHYROIDISM, UNSPECIFIED TYPE: ICD-10-CM

## 2022-04-01 DIAGNOSIS — G56.23 LESIONS OF BOTH ULNAR NERVES: ICD-10-CM

## 2022-04-01 DIAGNOSIS — G47.00 PERSISTENT DISORDER OF INITIATING OR MAINTAINING SLEEP: ICD-10-CM

## 2022-04-01 LAB
ALBUMIN SERPL-MCNC: 3.9 G/DL (ref 3.4–5)
ALP SERPL-CCNC: 173 U/L (ref 40–150)
ALT SERPL W P-5'-P-CCNC: 103 U/L (ref 0–50)
ANION GAP SERPL CALCULATED.3IONS-SCNC: 7 MMOL/L (ref 3–14)
AST SERPL W P-5'-P-CCNC: 65 U/L (ref 0–45)
BILIRUB SERPL-MCNC: 0.2 MG/DL (ref 0.2–1.3)
BUN SERPL-MCNC: 16 MG/DL (ref 7–30)
CALCIUM SERPL-MCNC: 9.6 MG/DL (ref 8.5–10.1)
CHLORIDE BLD-SCNC: 108 MMOL/L (ref 94–109)
CO2 SERPL-SCNC: 29 MMOL/L (ref 20–32)
CREAT SERPL-MCNC: 1.21 MG/DL (ref 0.52–1.04)
FERRITIN SERPL-MCNC: 13 NG/ML (ref 8–252)
GFR SERPL CREATININE-BSD FRML MDRD: 52 ML/MIN/1.73M2
GLUCOSE BLD-MCNC: 96 MG/DL (ref 70–99)
MAGNESIUM SERPL-MCNC: 2.4 MG/DL (ref 1.6–2.3)
POTASSIUM BLD-SCNC: 3.6 MMOL/L (ref 3.4–5.3)
PROT SERPL-MCNC: 7.4 G/DL (ref 6.8–8.8)
SODIUM SERPL-SCNC: 144 MMOL/L (ref 133–144)
T4 FREE SERPL-MCNC: 0.65 NG/DL (ref 0.76–1.46)
TSH SERPL DL<=0.005 MIU/L-ACNC: 99.63 MU/L (ref 0.4–4)

## 2022-04-01 PROCEDURE — 84439 ASSAY OF FREE THYROXINE: CPT | Performed by: FAMILY MEDICINE

## 2022-04-01 PROCEDURE — 99214 OFFICE O/P EST MOD 30 MIN: CPT | Mod: 25 | Performed by: FAMILY MEDICINE

## 2022-04-01 PROCEDURE — 82728 ASSAY OF FERRITIN: CPT | Performed by: FAMILY MEDICINE

## 2022-04-01 PROCEDURE — 84443 ASSAY THYROID STIM HORMONE: CPT | Performed by: FAMILY MEDICINE

## 2022-04-01 PROCEDURE — 90471 IMMUNIZATION ADMIN: CPT | Performed by: FAMILY MEDICINE

## 2022-04-01 PROCEDURE — 96127 BRIEF EMOTIONAL/BEHAV ASSMT: CPT | Mod: 59 | Performed by: FAMILY MEDICINE

## 2022-04-01 PROCEDURE — 80053 COMPREHEN METABOLIC PANEL: CPT | Performed by: FAMILY MEDICINE

## 2022-04-01 PROCEDURE — 36415 COLL VENOUS BLD VENIPUNCTURE: CPT | Performed by: FAMILY MEDICINE

## 2022-04-01 PROCEDURE — 90715 TDAP VACCINE 7 YRS/> IM: CPT | Performed by: FAMILY MEDICINE

## 2022-04-01 PROCEDURE — 83735 ASSAY OF MAGNESIUM: CPT | Performed by: FAMILY MEDICINE

## 2022-04-01 RX ORDER — SERTRALINE HYDROCHLORIDE 100 MG/1
100 TABLET, FILM COATED ORAL DAILY
Qty: 30 TABLET | Refills: 0 | Status: SHIPPED | OUTPATIENT
Start: 2022-04-01 | End: 2022-05-11

## 2022-04-01 RX ORDER — ROPINIROLE 0.25 MG/1
0.25 TABLET, FILM COATED ORAL AT BEDTIME
Qty: 90 TABLET | Refills: 0 | Status: SHIPPED | OUTPATIENT
Start: 2022-04-01 | End: 2022-05-11

## 2022-04-01 RX ORDER — ZOLPIDEM TARTRATE 10 MG/1
TABLET ORAL
Qty: 30 TABLET | Refills: 0 | Status: SHIPPED | OUTPATIENT
Start: 2022-04-01 | End: 2022-05-11

## 2022-04-01 RX ORDER — TRAMADOL HYDROCHLORIDE 50 MG/1
TABLET ORAL
Qty: 7 TABLET | Refills: 0 | Status: SHIPPED | OUTPATIENT
Start: 2022-04-01

## 2022-04-01 ASSESSMENT — ANXIETY QUESTIONNAIRES
GAD7 TOTAL SCORE: 4
GAD7 TOTAL SCORE: 4
2. NOT BEING ABLE TO STOP OR CONTROL WORRYING: NOT AT ALL
7. FEELING AFRAID AS IF SOMETHING AWFUL MIGHT HAPPEN: NOT AT ALL
4. TROUBLE RELAXING: SEVERAL DAYS
5. BEING SO RESTLESS THAT IT IS HARD TO SIT STILL: SEVERAL DAYS
3. WORRYING TOO MUCH ABOUT DIFFERENT THINGS: NOT AT ALL
1. FEELING NERVOUS, ANXIOUS, OR ON EDGE: SEVERAL DAYS
GAD7 TOTAL SCORE: 4
6. BECOMING EASILY ANNOYED OR IRRITABLE: SEVERAL DAYS
7. FEELING AFRAID AS IF SOMETHING AWFUL MIGHT HAPPEN: NOT AT ALL

## 2022-04-01 ASSESSMENT — PATIENT HEALTH QUESTIONNAIRE - PHQ9
SUM OF ALL RESPONSES TO PHQ QUESTIONS 1-9: 12
SUM OF ALL RESPONSES TO PHQ QUESTIONS 1-9: 12
10. IF YOU CHECKED OFF ANY PROBLEMS, HOW DIFFICULT HAVE THESE PROBLEMS MADE IT FOR YOU TO DO YOUR WORK, TAKE CARE OF THINGS AT HOME, OR GET ALONG WITH OTHER PEOPLE: VERY DIFFICULT

## 2022-04-01 ASSESSMENT — PAIN SCALES - GENERAL: PAINLEVEL: MODERATE PAIN (4)

## 2022-04-01 NOTE — LETTER
April 5, 2022      Doroteo Rodriugez  [address] [place] [id number]        Dear ,    We are writing to inform you of your test results.    Your ferritin remains low. I will send in a different iron form - please let us know if the cost is too great. I would like you to take this every day for 3-4 months and then get your numbers rechecked.  As expected, your thyroid numbers show that you aren't taking enough medication but you said that you don't take it every day. Please find a way to take your meds every day and we can recheck this labwork in 6 weeks.       Resulted Orders   TSH with free T4 reflex   Result Value Ref Range    TSH 99.63 (H) 0.40 - 4.00 mU/L   Magnesium   Result Value Ref Range    Magnesium 2.4 (H) 1.6 - 2.3 mg/dL   Ferritin   Result Value Ref Range    Ferritin 13 8 - 252 ng/mL   Comprehensive metabolic panel (BMP + Alb, Alk Phos, ALT, AST, Total. Bili, TP)   Result Value Ref Range    Sodium 144 133 - 144 mmol/L    Potassium 3.6 3.4 - 5.3 mmol/L    Chloride 108 94 - 109 mmol/L    Carbon Dioxide (CO2) 29 20 - 32 mmol/L    Anion Gap 7 3 - 14 mmol/L    Urea Nitrogen 16 7 - 30 mg/dL    Creatinine 1.21 (H) 0.52 - 1.04 mg/dL    Calcium 9.6 8.5 - 10.1 mg/dL    Glucose 96 70 - 99 mg/dL    Alkaline Phosphatase 173 (H) 40 - 150 U/L    AST 65 (H) 0 - 45 U/L     (H) 0 - 50 U/L    Protein Total 7.4 6.8 - 8.8 g/dL    Albumin 3.9 3.4 - 5.0 g/dL    Bilirubin Total 0.2 0.2 - 1.3 mg/dL    GFR Estimate 52 (L) >60 mL/min/1.73m2      Comment:      Effective December 21, 2021 eGFRcr in adults is calculated using the 2021 CKD-EPI creatinine equation which includes age and gender (Roque davison al., NEJM, DOI: 10.1056/IFLOym0478928)   T4 free   Result Value Ref Range    Free T4 0.65 (L) 0.76 - 1.46 ng/dL       If you have any questions or concerns, please call the clinic at the number listed above.       Sincerely,      Nieves Welch MD/ap[place]

## 2022-04-01 NOTE — PROGRESS NOTES
"  Assessment & Plan   Adjustment disorder with depressed mood  JOHN (generalized anxiety disorder)  Continue on this med. She feels it is helpful   - sertraline (ZOLOFT) 100 MG tablet; Take 1 tablet (100 mg) by mouth daily      Visit for screening mammogram  Due for this - discussed   - *MA Screening Digital Bilateral; Future    Impingement syndrome, shoulder, right  Very rare usage - got #7 tablets last fall. Refilled today   - traMADol (ULTRAM) 50 MG tablet; Once daily prn pain    Persistent disorder of initiating or maintaining sleep  Uses ambien nightly for sleep   - zolpidem (AMBIEN) 10 MG tablet; TAKE 1 TABLET(10 MG) BY MOUTH EVERY NIGHT AS NEEDED FOR SLEEP    Need for vaccination  Discussed. She will do tdap today   - TDAP VACCINE (Adacel, Boostrix)  [[id number]]    Hypothyroidism, unspecified type  Says she doesn't take her med regularly. Will see what her labs look like today. The patient indicates understanding of these issues and agrees with the plan.   - TSH with free T4 reflex; Future  - TSH with free T4 reflex  - T4 free    Low magnesium levels  Last checked, levels were low   - Magnesium; Future  - Magnesium    Low ferritin  History of low ferritin. Isn't taking her iron regularly.   - Ferritin; Future  - Ferritin    Situational depression  Stable on zoloft     Cervical spinal stenosis C5-6, C6-7 discs and foraminal stenosis  Ongoing pain issues \"for years\" very rare tramadol use   - traMADol (ULTRAM) 50 MG tablet; Once daily prn pain    Other insomnia  Uses ambien nightly   - Comprehensive metabolic panel (BMP + Alb, Alk Phos, ALT, AST, Total. Bili, TP); Future  - zolpidem (AMBIEN) 10 MG tablet; TAKE 1 TABLET(10 MG) BY MOUTH EVERY NIGHT AS NEEDED FOR SLEEP  - Comprehensive metabolic panel (BMP + Alb, Alk Phos, ALT, AST, Total. Bili, TP)    Restless legs syndrome  My guess is that her iron is still low and she needs to replenish it. Will check today and try a different type of iron to see if she can " tolerate it. Will switch RLS med to requip and start at low dose. The patient indicates understanding of these issues and agrees with the plan.   - Ferritin; Future  - Comprehensive metabolic panel (BMP + Alb, Alk Phos, ALT, AST, Total. Bili, TP); Future  - rOPINIRole (REQUIP) 0.25 MG tablet; Take 1 tablet (0.25 mg) by mouth At Bedtime  - Ferritin  - Comprehensive metabolic panel (BMP + Alb, Alk Phos, ALT, AST, Total. Bili, TP)    Lesions of both ulnar nerves    - traMADol (ULTRAM) 50 MG tablet; Once daily prn pain    History of drug abuse (H)  Sober for years now     Elevated liver function tests  Unclear etiology. Asked her to abstain from any etoh or tylenol and check for recheck in 2 weeks. The patient indicates understanding of these issues and agrees with the plan.   - Comprehensive metabolic panel (BMP + Alb, Alk Phos, ALT, AST, Total. Bili, TP); Future    Elevated serum creatinine  Recheck in 2 weeks  - Comprehensive metabolic panel (BMP + Alb, Alk Phos, ALT, AST, Total. Bili, TP); Future       Depression Screening Follow Up    PHQ 4/1/2022   PHQ-9 Total Score 12   Q9: Thoughts of better off dead/self-harm past 2 weeks Nearly every day   F/U: Thoughts of suicide or self-harm Yes   F/U: Self harm-plan Yes   F/U: Self-harm action No   F/U: Safety concerns No     Last PHQ-9 4/1/2022   1.  Little interest or pleasure in doing things 1   2.  Feeling down, depressed, or hopeless 1   3.  Trouble falling or staying asleep, or sleeping too much 1   4.  Feeling tired or having little energy 0   5.  Poor appetite or overeating 1   6.  Feeling bad about yourself 1   7.  Trouble concentrating 1   8.  Moving slowly or restless 3   Q9: Thoughts of better off dead/self-harm past 2 weeks 3   PHQ-9 Total Score 12   In the past two weeks have you had thoughts of suicide or self harm? Yes   Do you have concerns about your personal safety or the safety of others? No   In the past 2 weeks have you thought about a plan or had  intention to harm yourself? Yes   In the past 2 weeks have you acted on these thoughts in any way? No               Follow Up        Follow Up Actions Taken  Crisis resource information provided in the After Visit Summary    Discussed the following ways the patient can remain in a safe environment:  be around others    Return in about 4 weeks (around 4/29/2022) for based on labs etc .    Nieves Welch MD  [facility] RADHA Sadler is a 56 year old who presents for the following health issues     Chief Complaint   Patient presents with     Recheck Medication     Check thyroid. Would like to talk about something other than Gabapentin for restless legs-doesn't think it helps         History of Present Illness       Mental Health Follow-up:  Patient presents to follow-up on Depression & Anxiety.Patient's depression since last visit has been:  Medium  The patient is having other symptoms associated with depression.  Patient's anxiety since last visit has been:  Medium  The patient is having other symptoms associated with anxiety.  Any significant life events: relationship concerns, housing concerns and grief or loss  Patient is feeling anxious or having panic attacks.  Patient has no concerns about alcohol or drug use.       Today's PHQ-9         PHQ-9 Total Score: 12  PHQ-9 Q9 Thoughts of better off dead/self-harm past 2 weeks :   (P) Nearly every day  Thoughts of suicide or self harm: (P) Yes  Self-harm Plan:   (P) Yes  Self-harm Action:     (P) No  Safety concerns for self or others: (P) No    How difficult have these problems made it for you to do your work, take care of things at home, or get along with other people: Very difficult    Today's JOHN-7 Score: 4    Hypothyroidism:     Since last visit, patient describes the following symptoms::  Anxiety, Depression, Dry skin, Fatigue and Weight gain    Weight gain::  No weight gain    She eats 0-1 servings of fruits and vegetables daily.She  "consumes 1 sweetened beverage(s) daily.She exercises with enough effort to increase her heart rate 9 or less minutes per day.  She exercises with enough effort to increase her heart rate 7 days per week. She is missing 3 dose(s) of medications per week.  She is not taking prescribed medications regularly due to remembering to take.     Low ferritin  Not on the iron for 6 months or so   It was too expensive       Restless legs  Not effective   Tried 300 mg nightly gabapentin   Interested in trying a different medication     Thyroid   Elevated last year   On 125mg now   Forgets to take her meds     Mood   On zoloft     Thinks about suicide but would never act on it because she wouldn't leave her son alone here  She has lost her daughter and her    No plan for suicide     Tizanidine - takes it rarely for headaches  Currently getting botox shots for migraines    Tramadol - uses it for neuropathy in her arms  Since 1999  7 pills will last her 4 months. Its when she can't handle the pain anymore     ambien - nightly for sleep     Colonoscopy   -done     mammo - due for this     Vaccinations   Wants to do shingrix  Due for tetanus     Review of Systems   Constitutional, HEENT, cardiovascular, pulmonary, gi and gu systems are negative, except as otherwise noted.      Objective    /82   Pulse 87   Temp 97.6  F (36.4  C) (Tympanic)   Resp 14   Ht 1.575 m (5' 2\")   Wt 53.1 kg (117 lb)   SpO2 96%   Breastfeeding No   BMI 21.40 kg/m    Body mass index is 21.4 kg/m .  Physical Exam   GENERAL: healthy, alert and no distress  EYES: Eyes grossly normal to inspection, PERRL and conjunctivae and sclerae normal  RESP: lungs clear to auscultation - no rales, rhonchi or wheezes  CV: regular rate and rhythm, normal S1 S2, no S3 or S4, no murmur, click or rub, no peripheral edema and peripheral pulses strong  MS: no gross musculoskeletal defects noted, no edema  NEURO: Normal strength and tone, mentation intact and " speech normal  PSYCH: mentation appears normal, affect normal/bright

## 2022-04-02 ASSESSMENT — ANXIETY QUESTIONNAIRES: GAD7 TOTAL SCORE: 4

## 2022-04-02 ASSESSMENT — PATIENT HEALTH QUESTIONNAIRE - PHQ9: SUM OF ALL RESPONSES TO PHQ QUESTIONS 1-9: 12

## 2022-04-04 ASSESSMENT — COLUMBIA-SUICIDE SEVERITY RATING SCALE - C-SSRS
6. HAVE YOU EVER DONE ANYTHING, STARTED TO DO ANYTHING, OR PREPARED TO DO ANYTHING TO END YOUR LIFE?: NO
1. WITHIN THE PAST MONTH, HAVE YOU WISHED YOU WERE DEAD OR WISHED YOU COULD GO TO SLEEP AND NOT WAKE UP?: NO
2. IN THE PAST MONTH, HAVE YOU ACTUALLY HAD ANY THOUGHTS OF KILLING YOURSELF?: NO

## 2022-04-08 DIAGNOSIS — G25.81 RESTLESS LEGS SYNDROME: ICD-10-CM

## 2022-04-08 RX ORDER — ROPINIROLE 0.25 MG/1
TABLET, FILM COATED ORAL
Qty: 90 TABLET | Refills: 0 | OUTPATIENT
Start: 2022-04-08

## 2022-04-24 ENCOUNTER — HEALTH MAINTENANCE LETTER (OUTPATIENT)
Age: 57
End: 2022-04-24

## 2022-05-05 DIAGNOSIS — F43.21 ADJUSTMENT DISORDER WITH DEPRESSED MOOD: ICD-10-CM

## 2022-05-05 DIAGNOSIS — G56.23 LESIONS OF BOTH ULNAR NERVES: ICD-10-CM

## 2022-05-05 DIAGNOSIS — G25.81 RESTLESS LEGS SYNDROME: ICD-10-CM

## 2022-05-05 DIAGNOSIS — M75.41 IMPINGEMENT SYNDROME, SHOULDER, RIGHT: ICD-10-CM

## 2022-05-05 DIAGNOSIS — M48.02 CERVICAL SPINAL STENOSIS: ICD-10-CM

## 2022-05-05 DIAGNOSIS — G47.00 PERSISTENT DISORDER OF INITIATING OR MAINTAINING SLEEP: ICD-10-CM

## 2022-05-05 DIAGNOSIS — E03.9 HYPOTHYROIDISM, UNSPECIFIED TYPE: ICD-10-CM

## 2022-05-05 DIAGNOSIS — F41.1 GAD (GENERALIZED ANXIETY DISORDER): ICD-10-CM

## 2022-05-05 DIAGNOSIS — G47.09 OTHER INSOMNIA: ICD-10-CM

## 2022-05-05 RX ORDER — FERROUS SULFATE 325(65) MG
325 TABLET ORAL
Qty: 90 TABLET | Refills: 1 | Status: CANCELLED | OUTPATIENT
Start: 2022-05-05

## 2022-05-11 RX ORDER — ZOLPIDEM TARTRATE 10 MG/1
TABLET ORAL
Qty: 30 TABLET | Refills: 3 | Status: SHIPPED | OUTPATIENT
Start: 2022-05-11 | End: 2022-08-11

## 2022-05-11 RX ORDER — LEVOTHYROXINE SODIUM 125 UG/1
TABLET ORAL
Qty: 180 TABLET | Refills: 1 | Status: SHIPPED | OUTPATIENT
Start: 2022-05-11

## 2022-05-11 RX ORDER — TRAMADOL HYDROCHLORIDE 50 MG/1
TABLET ORAL
Qty: 7 TABLET | Refills: 0 | OUTPATIENT
Start: 2022-05-11

## 2022-05-11 RX ORDER — ROPINIROLE 0.25 MG/1
0.25 TABLET, FILM COATED ORAL AT BEDTIME
Qty: 90 TABLET | Refills: 1 | Status: SHIPPED | OUTPATIENT
Start: 2022-05-11 | End: 2022-10-05

## 2022-05-11 RX ORDER — SERTRALINE HYDROCHLORIDE 100 MG/1
100 TABLET, FILM COATED ORAL DAILY
Qty: 90 TABLET | Refills: 3 | Status: SHIPPED | OUTPATIENT
Start: 2022-05-11

## 2022-06-01 ENCOUNTER — TRANSFERRED RECORDS (OUTPATIENT)
Dept: HEALTH INFORMATION MANAGEMENT | Facility: CLINIC | Age: 57
End: 2022-06-01
Payer: MEDICARE

## 2022-08-11 DIAGNOSIS — G47.00 PERSISTENT DISORDER OF INITIATING OR MAINTAINING SLEEP: ICD-10-CM

## 2022-08-11 DIAGNOSIS — G47.09 OTHER INSOMNIA: ICD-10-CM

## 2022-08-11 RX ORDER — ZOLPIDEM TARTRATE 10 MG/1
TABLET ORAL
Qty: 30 TABLET | Refills: 3 | Status: SHIPPED | OUTPATIENT
Start: 2022-08-11 | End: 2022-11-30

## 2022-09-27 ENCOUNTER — TELEPHONE (OUTPATIENT)
Dept: FAMILY MEDICINE | Facility: CLINIC | Age: 57
End: 2022-09-27

## 2022-09-27 NOTE — TELEPHONE ENCOUNTER
",     Antonino Soni, Nurse Practitioner with \"[facility]\" at  [phone]; called and reports that Jackie scored a  16 on her PHQ 9 today;   Antonino said, \"Jackie expressed suicide ideation; but has no plan and said  that she would never do it. Jackie said that she has already told this to Dr. Welch.\"  PHQ 1/26/2021 11/10/2021 4/1/2022   PHQ-9 Total Score 10 13 12   Q9: Thoughts of better off dead/self-harm past 2 weeks Not at all Several days Nearly every day   F/U: Thoughts of suicide or self-harm - Yes Yes   F/U: Self harm-plan - No Yes   F/U: Self-harm action - No No   F/U: Safety concerns - No No       Adeel Wang RN    "

## 2022-09-28 NOTE — TELEPHONE ENCOUNTER
Please call Doroteo and confirm no plan of action and ways to stay safe.     I see that we discussed her mood in April and she noticed ideation at that time as well.    She was supposed to check back in with me in a month or so AND was supposed to get labs done. Please schedule her with me within a month or so,  assuming she is stable when you call her.     CONNIE Welch MD

## 2022-09-28 NOTE — TELEPHONE ENCOUNTER
Call placed to patient   No answer; voicemail not set-up; unable to leave a message; will need to re-call at another time    Adam Forbes RN

## 2022-09-30 NOTE — TELEPHONE ENCOUNTER
Call placed to patient   Relayed Dr. Welch's message     Patient has no plan to self harm - states she has a son and would never put him through the pain of losing his mother   Admits to suicidal ideation at times, but again, no active plan     Patient reports good support from family and friends   Feels safe in her home  Tolerating depression medication     Patient unable to schedule appointment during call as she was driving   Requesting call back on Monday to schedule   Will forward to AFR to assist with scheduling on Monday     Patient verbalized understanding  No further questions/concerns    Adam Forbes RN

## 2022-10-02 DIAGNOSIS — G25.81 RESTLESS LEGS SYNDROME: ICD-10-CM

## 2022-10-04 NOTE — TELEPHONE ENCOUNTER
"Requested Prescriptions   Pending Prescriptions Disp Refills     rOPINIRole (REQUIP) 0.25 MG tablet [Pharmacy Med Name: ROPINIROLE 0.25MG TAB 0.25 Tablet] 30 tablet 10     Sig: TAKE 1 TABLET BY MOUTH AT BEDTIME       Antiparkinson's Agents Protocol Failed - 10/2/2022  5:51 PM        Failed - CBC on record in past 12 months     Recent Labs   Lab Test 03/30/21  1147   WBC 6.3   RBC 4.89   HGB 11.9   HCT 39.5                    Failed - Recent (6 mo) or future (30 days) visit within the authorizing provider's specialty     Patient had office visit in the last 6 months or has a visit in the next 30 days with authorizing provider or within the authorizing provider's specialty.  See \"Patient Info\" tab in inbasket, or \"Choose Columns\" in Meds & Orders section of the refill encounter.            Passed - Blood pressure under 140/90 in past 12 months     BP Readings from Last 3 Encounters:   04/01/22 125/82   07/02/21 122/80   03/30/21 118/78                 Passed - ALT on record in past 12 months         Recent Labs   Lab Test 04/01/22  1557   *             Passed - Serum Creatinine on file in past 12 months     Recent Labs   Lab Test 04/01/22  1557   CR 1.21*       Ok to refill medication if creatinine is low          Passed - Medication is active on med list        Passed - Patient is age 18 or older        Passed - No active pregnancy on record        Passed - No positive pregnancy test in the past 12 months             "

## 2022-10-05 RX ORDER — ROPINIROLE 0.25 MG/1
TABLET, FILM COATED ORAL
Qty: 30 TABLET | Refills: 6 | Status: SHIPPED | OUTPATIENT
Start: 2022-10-05 | End: 2024-05-23

## 2022-11-19 ENCOUNTER — HEALTH MAINTENANCE LETTER (OUTPATIENT)
Age: 57
End: 2022-11-19

## 2022-11-29 DIAGNOSIS — G47.09 OTHER INSOMNIA: ICD-10-CM

## 2022-11-29 DIAGNOSIS — G47.00 PERSISTENT DISORDER OF INITIATING OR MAINTAINING SLEEP: ICD-10-CM

## 2022-11-30 RX ORDER — ZOLPIDEM TARTRATE 10 MG/1
TABLET ORAL
Qty: 30 TABLET | Refills: 3 | Status: SHIPPED | OUTPATIENT
Start: 2022-11-30

## 2023-06-01 ENCOUNTER — HEALTH MAINTENANCE LETTER (OUTPATIENT)
Age: 58
End: 2023-06-01

## 2023-08-30 NOTE — PATIENT INSTRUCTIONS
Your wet prep was negative,   The rest of the tests will be back tomorrow.    You will get a MyHeritaget message tomorrow once the results come back    [FreeTextEntry1] : Interval History: Given disease response observed on scans, she has not resumed neoadjuvant therapy. She feels good. She denies chest pain, palpitations, and notes her BP seems better controlled since starting carvedilol. She was evaluated by Pulmonologist and is not on steroids. Shortness of breath is back to her baseline. She has not yet scheduled the cardiac MRI. There were two appointments offered to her today, but she feels overwhelmed by all the appointments and did not want to do the test today.  Scheduled for lumpectomy and axillary lymph node dissection  with Dr. Schulte.  History: This 55 year old woman with no cardiac history, but h/o BOOP, began neoadjuvant treatment with chemo/immunotherapy (TC and pembrolizumab) on 3/29/23. She completed 12 weeks of TC and received one cycle of AC with 3rd dose of pembrolizumab on 23. A few days after this she developed acute shortness of breath and was admitted to [facility]. CT-angiogram was negative for PE, but there were cavitary lung lesions compatible with organizing pneumonia/pneumonitis. Hospital records not available to me, but she reports her breathing improved without intervention and she is near her baseline now.   An echo done  (after discharge) showed a decrease in the LVEF to 55 % (from 70 %) with hypokinesis of the basal inferior wall.   She never had edema, orthopnea, palpitations, vision changes, focal muscle weakness, or chest pain.  Baseline ET is 10-15 blocks, was down to about 1 block at time of hospitalization, now can go about the same as baseline, but has to push herself harder. She reports having a stress test several years ago which was negative. She has no history of prior coronary interventions or clinical cardiovascular disease.  Patient reports being diagnosed with BOOP at [facility] in  by biopsy. She followed there for several years but received no specific treatment. A chest CT done in  showed no inflammatory lung disease.  Lives [address] in [place] with her daughter.   Cardiovascular Summary: ---------------------------------------------- EC2023: declined. 2023: sinus with LVH ---------------------------------------------- Echo: 2023: EF 65%, basal/mid inferior hypokinesis, GLS - 21.9 (suboptimal quality), no effusion 3/27/2023: EF 70 %, GLS -22.4 ----------------------------------------------

## 2023-10-12 ENCOUNTER — TELEPHONE (OUTPATIENT)
Dept: FAMILY MEDICINE | Facility: CLINIC | Age: 58
End: 2023-10-12

## 2023-10-12 NOTE — TELEPHONE ENCOUNTER
Patient called the clinic today wondering if Dr. Welch would be willing to sign of form from her apartment. She has nerve damage in her arms and would like a shower head that has a detachable head. She needs a form signed saying she needs it.     Patient has not been seen since 4/2022. RN scheduled a annual visit for 11/24/2023 only time that fit patients schedule.     Would provider be willing to sign this before she sees her?    Tania Reddy RN on 10/12/2023 at 3:34 PM

## 2023-11-19 ENCOUNTER — HEALTH MAINTENANCE LETTER (OUTPATIENT)
Age: 58
End: 2023-11-19

## 2024-04-11 ENCOUNTER — TRANSFERRED RECORDS (OUTPATIENT)
Dept: HEALTH INFORMATION MANAGEMENT | Facility: CLINIC | Age: 59
End: 2024-04-11

## 2024-05-16 PROBLEM — Z00.00 PREVENTATIVE HEALTH CARE: Status: ACTIVE | Noted: 2024-05-16

## 2024-05-16 PROBLEM — R79.0 LOW FERRITIN: Status: RESOLVED | Noted: 2021-01-26 | Resolved: 2024-05-16

## 2024-05-22 PROBLEM — Z87.19 HISTORY OF SMALL BOWEL OBSTRUCTION: Status: ACTIVE | Noted: 2019-08-13

## 2024-05-23 ENCOUNTER — ANCILLARY PROCEDURE (OUTPATIENT)
Dept: GENERAL RADIOLOGY | Facility: CLINIC | Age: 59
End: 2024-05-23
Attending: PHYSICIAN ASSISTANT
Payer: COMMERCIAL

## 2024-05-23 ENCOUNTER — OFFICE VISIT (OUTPATIENT)
Dept: FAMILY MEDICINE | Facility: CLINIC | Age: 59
End: 2024-05-23
Payer: COMMERCIAL

## 2024-05-23 VITALS
WEIGHT: 124.4 LBS | SYSTOLIC BLOOD PRESSURE: 109 MMHG | RESPIRATION RATE: 18 BRPM | OXYGEN SATURATION: 97 % | DIASTOLIC BLOOD PRESSURE: 72 MMHG | TEMPERATURE: 98.5 F | HEART RATE: 89 BPM | HEIGHT: 62 IN | BODY MASS INDEX: 22.89 KG/M2

## 2024-05-23 DIAGNOSIS — G89.29 CHRONIC PAIN OF BOTH SHOULDERS: ICD-10-CM

## 2024-05-23 DIAGNOSIS — E61.1 IRON DEFICIENCY: ICD-10-CM

## 2024-05-23 DIAGNOSIS — Z98.84 S/P BARIATRIC SURGERY: ICD-10-CM

## 2024-05-23 DIAGNOSIS — M25.511 CHRONIC PAIN OF BOTH SHOULDERS: ICD-10-CM

## 2024-05-23 DIAGNOSIS — R41.840 DIFFICULTY CONCENTRATING: ICD-10-CM

## 2024-05-23 DIAGNOSIS — Z00.00 ENCOUNTER FOR MEDICARE ANNUAL WELLNESS EXAM: Primary | ICD-10-CM

## 2024-05-23 DIAGNOSIS — Z00.00 PREVENTATIVE HEALTH CARE: ICD-10-CM

## 2024-05-23 DIAGNOSIS — Z23 NEED FOR SHINGLES VACCINE: ICD-10-CM

## 2024-05-23 DIAGNOSIS — G25.81 RESTLESS LEGS SYNDROME: ICD-10-CM

## 2024-05-23 DIAGNOSIS — Z12.31 VISIT FOR SCREENING MAMMOGRAM: ICD-10-CM

## 2024-05-23 DIAGNOSIS — F41.1 GAD (GENERALIZED ANXIETY DISORDER): ICD-10-CM

## 2024-05-23 DIAGNOSIS — G47.33 OSA (OBSTRUCTIVE SLEEP APNEA): ICD-10-CM

## 2024-05-23 DIAGNOSIS — G47.09 OTHER INSOMNIA: ICD-10-CM

## 2024-05-23 DIAGNOSIS — M25.512 CHRONIC PAIN OF BOTH SHOULDERS: ICD-10-CM

## 2024-05-23 DIAGNOSIS — F43.21 ADJUSTMENT DISORDER WITH DEPRESSED MOOD: ICD-10-CM

## 2024-05-23 DIAGNOSIS — G57.02 PIRIFORMIS SYNDROME, LEFT: ICD-10-CM

## 2024-05-23 DIAGNOSIS — E03.9 HYPOTHYROIDISM, UNSPECIFIED TYPE: ICD-10-CM

## 2024-05-23 DIAGNOSIS — E55.9 VITAMIN D DEFICIENCY: ICD-10-CM

## 2024-05-23 DIAGNOSIS — S20.211A HEMATOMA, CHEST WALL, RIGHT, INITIAL ENCOUNTER: ICD-10-CM

## 2024-05-23 DIAGNOSIS — F19.11 HISTORY OF DRUG ABUSE (H): ICD-10-CM

## 2024-05-23 DIAGNOSIS — M25.562 ACUTE PAIN OF LEFT KNEE: ICD-10-CM

## 2024-05-23 LAB
ERYTHROCYTE [DISTWIDTH] IN BLOOD BY AUTOMATED COUNT: 13.4 % (ref 10–15)
FOLATE SERPL-MCNC: 9.6 NG/ML (ref 4.6–34.8)
HCT VFR BLD AUTO: 38.4 % (ref 35–47)
HGB BLD-MCNC: 12.1 G/DL (ref 11.7–15.7)
MCH RBC QN AUTO: 30.7 PG (ref 26.5–33)
MCHC RBC AUTO-ENTMCNC: 31.5 G/DL (ref 31.5–36.5)
MCV RBC AUTO: 98 FL (ref 78–100)
PLATELET # BLD AUTO: 260 10E3/UL (ref 150–450)
RBC # BLD AUTO: 3.94 10E6/UL (ref 3.8–5.2)
WBC # BLD AUTO: 5.6 10E3/UL (ref 4–11)

## 2024-05-23 PROCEDURE — 73030 X-RAY EXAM OF SHOULDER: CPT | Mod: TC | Performed by: RADIOLOGY

## 2024-05-23 PROCEDURE — 85027 COMPLETE CBC AUTOMATED: CPT | Performed by: PHYSICIAN ASSISTANT

## 2024-05-23 PROCEDURE — 73560 X-RAY EXAM OF KNEE 1 OR 2: CPT | Mod: TC | Performed by: RADIOLOGY

## 2024-05-23 PROCEDURE — 83540 ASSAY OF IRON: CPT | Performed by: PHYSICIAN ASSISTANT

## 2024-05-23 PROCEDURE — 83735 ASSAY OF MAGNESIUM: CPT | Performed by: PHYSICIAN ASSISTANT

## 2024-05-23 PROCEDURE — 99396 PREV VISIT EST AGE 40-64: CPT | Performed by: PHYSICIAN ASSISTANT

## 2024-05-23 PROCEDURE — 82728 ASSAY OF FERRITIN: CPT | Performed by: PHYSICIAN ASSISTANT

## 2024-05-23 PROCEDURE — 82306 VITAMIN D 25 HYDROXY: CPT | Performed by: PHYSICIAN ASSISTANT

## 2024-05-23 PROCEDURE — 82746 ASSAY OF FOLIC ACID SERUM: CPT | Performed by: PHYSICIAN ASSISTANT

## 2024-05-23 PROCEDURE — 99214 OFFICE O/P EST MOD 30 MIN: CPT | Mod: 25 | Performed by: PHYSICIAN ASSISTANT

## 2024-05-23 PROCEDURE — 82607 VITAMIN B-12: CPT | Performed by: PHYSICIAN ASSISTANT

## 2024-05-23 PROCEDURE — 80053 COMPREHEN METABOLIC PANEL: CPT | Performed by: PHYSICIAN ASSISTANT

## 2024-05-23 PROCEDURE — 36415 COLL VENOUS BLD VENIPUNCTURE: CPT | Performed by: PHYSICIAN ASSISTANT

## 2024-05-23 PROCEDURE — 84439 ASSAY OF FREE THYROXINE: CPT | Performed by: PHYSICIAN ASSISTANT

## 2024-05-23 PROCEDURE — 84443 ASSAY THYROID STIM HORMONE: CPT | Performed by: PHYSICIAN ASSISTANT

## 2024-05-23 RX ORDER — ROPINIROLE 0.25 MG/1
0.5 TABLET, FILM COATED ORAL AT BEDTIME
Qty: 60 TABLET | Refills: 11 | Status: SHIPPED | OUTPATIENT
Start: 2024-05-23

## 2024-05-23 SDOH — HEALTH STABILITY: PHYSICAL HEALTH: ON AVERAGE, HOW MANY DAYS PER WEEK DO YOU ENGAGE IN MODERATE TO STRENUOUS EXERCISE (LIKE A BRISK WALK)?: 3 DAYS

## 2024-05-23 SDOH — HEALTH STABILITY: PHYSICAL HEALTH: ON AVERAGE, HOW MANY MINUTES DO YOU ENGAGE IN EXERCISE AT THIS LEVEL?: 10 MIN

## 2024-05-23 ASSESSMENT — SOCIAL DETERMINANTS OF HEALTH (SDOH): HOW OFTEN DO YOU GET TOGETHER WITH FRIENDS OR RELATIVES?: THREE TIMES A WEEK

## 2024-05-23 ASSESSMENT — PATIENT HEALTH QUESTIONNAIRE - PHQ9
SUM OF ALL RESPONSES TO PHQ QUESTIONS 1-9: 15
10. IF YOU CHECKED OFF ANY PROBLEMS, HOW DIFFICULT HAVE THESE PROBLEMS MADE IT FOR YOU TO DO YOUR WORK, TAKE CARE OF THINGS AT HOME, OR GET ALONG WITH OTHER PEOPLE: SOMEWHAT DIFFICULT
SUM OF ALL RESPONSES TO PHQ QUESTIONS 1-9: 15

## 2024-05-23 NOTE — ASSESSMENT & PLAN NOTE
She has used ambien in the past.   She has couple pills at home.  Recommend proceeding with ADHD at this time.  She has used trazodone in the past but developed side effects.  She has also tried seroquel and melatonin.  We discussed sleep hygiene as well as use of melatonin today.  I would prefer not to use Ambien at this time due to her history of drug abuse.

## 2024-05-23 NOTE — ASSESSMENT & PLAN NOTE
She has been experiencing left buttocks pain for the past 2 months.  The pain does not radiate down her leg.  Exam demonstrates likely piriformis muscle syndrome.  She may benefit from physical therapy.  Will await results of her additional x-rays that she is having completed for shoulder and knee pain todetermine appropriate referral.

## 2024-05-23 NOTE — ASSESSMENT & PLAN NOTE
She has been experiencing left knee pain for the past 2 months.  No known injury.  The pain is located in the medial aspect.    Will proceed with x-ray of her left knee at this time.  We discussed this may be a meniscal tear, osteoarthritis.  No ligament laxity.  No MRI is indicated at this time.  Encouraged to use Tylenol 1000 mg 3 times a day.  She may also apply lidocaine patch or Voltaren gel.

## 2024-05-23 NOTE — PROGRESS NOTES
Preventive Care Visit  [facility]  Jennifer Light PA-C, Family Medicine  May 23, 2024      SUBJECTIVE:   Doroteo is a 59 year old, presenting for the following:  Annual Visit (L buttock pain x 3-4mos/L knee pain x 2-3 weeks/Bilat shoulder pain x 2mos)    Total of 52 minutes was spent in face-to-face and non-face-to-face time in care and coordination.  30 minutes was spent on other issues outside of the preventative.        5/23/2024    11:44 AM   Additional Questions   Roomed by JE   Accompanied by self     Are you in the first 12 months of your Medicare coverage?  No    HPI  Today's PHQ-9 Score:       5/23/2024    11:38 AM   PHQ-9 SCORE   PHQ-9 Total Score MyChart 15 (Moderately severe depression)   PHQ-9 Total Score 15         Have you ever done Advance Care Planning? (For example, a Health Directive, POLST, or a discussion with a medical provider or your loved ones about your wishes): No, advance care planning information given to patient to review.  Patient declined advance care planning discussion at this time.    Fall risk  Fell off step stool.  Does not require gait aid.  Cognitive Screening   1) Repeat 3 items (Leader, Season, Table) able to recall 2 items.  2) Clock draw: NORMAL  3) 3 item recall: Recalls 2 objects   Results: NORMAL clock, 1-2 items recalled: COGNITIVE IMPAIRMENT LESS LIKELY    Mini-CogTM Copyright S Delilah. Licensed by the author for use in [facility]; reprinted with permission (giancarlo@.[facility]). All rights reserved.        Reviewed and updated as needed this visit by clinical staff   Tobacco  Allergies  Meds  Problems  Med Hx  Surg Hx  Fam Hx          Reviewed and updated as needed this visit by Provider   Tobacco  Allergies  Meds  Problems  Med Hx  Surg Hx  Fam Hx          Social History     Tobacco Use    Smoking status: Former     Current packs/day: 0.00     Types: Cigarettes     Quit date: 6/28/2008     Years since quitting: 15.9     "Smokeless tobacco: Never   Substance Use Topics    Alcohol use: No           5/23/2024    11:35 AM   Alcohol Use   Prescreen: >3 drinks/day or >7 drinks/week? No     Do you have a current opioid prescription? No  Do you use any other controlled substances or medications that are not prescribed by a provider? None  Link to Social and Substance History :[id number]}      Patient Care Team:  Nieves Welch MD as PCP - General (Family Medicine)  Nieves Welch MD as Assigned PCP    The following health maintenance items are reviewed in Epic and correct as of today:  Health Maintenance   Topic Date Due    MIGRAINE ACTION PLAN  Never done    ZOSTER IMMUNIZATION (1 of 2) Never done    MAMMO SCREENING  12/07/2018    TSH W/FREE T4 REFLEX  04/01/2023    COVID-19 Vaccine (2 - 2023-24 season) 09/01/2023    RSV VACCINE (Pregnancy & 60+) (1 - 1-dose 60+ series) Never done    INFLUENZA VACCINE (Season Ended) 09/01/2024    GLUCOSE  04/01/2025    MEDICARE ANNUAL WELLNESS VISIT  05/23/2025    ANNUAL REVIEW OF HM ORDERS  05/23/2025    LIPID  11/20/2025    COLORECTAL CANCER SCREENING  12/07/2026    ADVANCE CARE PLANNING  05/23/2029    DTAP/TDAP/TD IMMUNIZATION (3 - Td or Tdap) 04/01/2032    HEPATITIS C SCREENING  Completed    HIV SCREENING  Completed    PHQ-2 (once per calendar year)  Completed    Pneumococcal Vaccine: Pediatrics (0 to 5 Years) and At-Risk Patients (6 to 64 Years)  Aged Out    IPV IMMUNIZATION  Aged Out    HPV IMMUNIZATION  Aged Out    MENINGITIS IMMUNIZATION  Aged Out    RSV MONOCLONAL ANTIBODY  Aged Out    PAP  Discontinued       FHS-7:        No data to display              Pertinent mammograms are reviewed under the imaging tab.  Review of Systems   Shoulder pain, knee pain, hematoma chest wall.    OBJECTIVE:   /72 (BP Location: Left arm, Patient Position: Sitting, Cuff Size: Adult Regular)   Pulse 89   Temp 98.5  F (36.9  C) (Oral)   Resp 18   Ht 1.562 m (5' 1.5\")   Wt 56.4 kg (124 lb 6.4 oz)   SpO2 " "97%   BMI 23.12 kg/m     Estimated body mass index is 23.12 kg/m  as calculated from the following:    Height as of this encounter: 1.562 m (5' 1.5\").    Weight as of this encounter: 56.4 kg (124 lb 6.4 oz).  Physical Exam  Vitals and nursing note reviewed.   Constitutional:       Appearance: Normal appearance. She is normal weight.   HENT:      Head: Normocephalic and atraumatic.      Right Ear: Tympanic membrane, ear canal and external ear normal.      Left Ear: Tympanic membrane, ear canal and external ear normal.      Mouth/Throat:      Mouth: Mucous membranes are moist.      Pharynx: Oropharynx is clear.   Eyes:      Conjunctiva/sclera: Conjunctivae normal.   Cardiovascular:      Rate and Rhythm: Normal rate and regular rhythm.      Pulses: Normal pulses.      Heart sounds: Normal heart sounds.      Comments: Palpable 1 cm hematoma right chest wall.  No bruising.  Pulmonary:      Effort: Pulmonary effort is normal.      Breath sounds: Normal breath sounds.   Abdominal:      General: Abdomen is flat. Bowel sounds are normal.      Palpations: Abdomen is soft.   Musculoskeletal:      Cervical back: Normal range of motion and neck supple.      Comments: On exam of her shoulders there is no obvious deformity.  She does have limited abduction of both shoulders particularly on the left.  She has difficulty raising her arms above her head.  Decreased internal rotation of both shoulders.    Left knee demonstrates mild effusion.  Tenderness over the medial joint line.  Normal flexion extension.  No ligament laxity.   Skin:     General: Skin is warm and dry.   Neurological:      General: No focal deficit present.      Mental Status: She is alert and oriented to person, place, and time.   Psychiatric:         Mood and Affect: Mood normal.         Behavior: Behavior normal.         Thought Content: Thought content normal.         Judgment: Judgment normal.           ASSESSMENT / PLAN:     Problem List Items Addressed This " Visit       Hypothyroidism     She is due for repeat TSH at this time.  Currently on levothyroxine 125 mcg daily.         Relevant Orders    TSH with free T4 reflex    Other insomnia     She has used ambien in the past.   She has couple pills at home.  Recommend proceeding with ADHD at this time.  She has used trazodone in the past but developed side effects.  She has also tried seroquel and melatonin.  We discussed sleep hygiene as well as use of melatonin today.  I would prefer not to use Ambien at this time due to her history of drug abuse.         History of drug abuse meth sober since 2007     Remains in sobriety.         LINA (obstructive sleep apnea)    Vitamin D deficiency     She has a history of vitamin D deficiency and bariatric surgery.  Will check vitamin D level at this time.         Relevant Orders    HC VITAMIN D; 1,25 DIHYDROXY    Adjustment disorder with depressed mood     Treated with sertraline.  Has been under a lot of stressors in recent years.  Her boyfriend was murdered 2 years ago.  She was then evicted from her place.  She recently got at home and lives in [place].  Her daughter was also killed in a car accident.  She is not currently receiving psychotherapy.  This was offered but she declined today.         S/P bariatric surgery     She has a history of bariatric surgery.  She is due for annual labs at this time.         Relevant Orders    CBC with Platelets    COMPREHENSIVE METABOLIC PANEL    Ferritin    Folate    HC VITAMIN D; 1,25 DIHYDROXY    Iron    VITAMIN B12, SERUM    Magnesium    JOHN (generalized anxiety disorder)     Symptoms stable on sertraline.         Restless legs syndrome     RLS treated with ropinirole.  Continues to have symptoms on current dose.  We will increase to 0.5 mg nightly.           Relevant Medications    rOPINIRole (REQUIP) 0.25 MG tablet    Other Relevant Orders    Ferritin    Iron    Preventative health care     Pap:    No longer indicated due to history  of hysterectomy.  Mammogram:   Due.  Colon cancer screen:    Due 2026.  Immunizations:   Due to zoster, RSV and COVID.  Lipids:    Due 2025.   Glucose:    Due 2025.   Dexa:   Will begin DEXA scans at age 64.         Hematoma, chest wall, right, initial encounter     Reports that she fell off a stepladder 6 weeks ago.  She sustained a hematoma to the right chest wall.  Exam today demonstrates a very 1 cm hematoma on the right chest wall.  Explained that this should resolve over the next several months.  No indicated at this time.         Piriformis syndrome, left     She has been experiencing left buttocks pain for the past 2 months.  The pain does not radiate down her leg.  Exam demonstrates likely piriformis muscle syndrome.  She may benefit from physical therapy.  Will await results of her additional x-rays that she is having completed for shoulder and knee pain todetermine appropriate referral.         Relevant Medications    rOPINIRole (REQUIP) 0.25 MG tablet    Acute pain of left knee     She has been experiencing left knee pain for the past 2 months.  No known injury.  The pain is located in the medial aspect.    Will proceed with x-ray of her left knee at this time.  We discussed this may be a meniscal tear, osteoarthritis.  No ligament laxity.  No MRI is indicated at this time.  Encouraged to use Tylenol 1000 mg 3 times a day.  She may also apply lidocaine patch or Voltaren gel.         Relevant Orders    XR Knee Left 1/2 Views    Chronic pain of both shoulders     She has been experiencing bilateral shoulder pain for the past 2 months.  She has difficulty lifting her arms above her head.  No known injury.    Proceed with x-rays of both shoulders today.  She does have impaired range of motion of both shoulders.  This may be a labrum tear, rotator cuff tear or arthritic changes.  Will await results of x-ray.  She may benefit from physical therapy or referral to orthopedist.  In the meantime I have recommended  Tylenol 1000 mg 3 times a day.  She may apply ice or heat.         Relevant Orders    XR Shoulder Left G/E 3 Views    XR Shoulder Right G/E 3 Views     Other Visit Diagnoses       Encounter for Medicare annual wellness exam    -  Primary    Visit for screening mammogram        Relevant Orders    MA SCREENING DIGITAL BILAT - Future  (s+30)    Need for shingles vaccine        Relevant Medications    zoster vaccine recombinant adjuvanted (SHINGRIX) injection    Difficulty concentrating        Relevant Orders    Adult Mental Health  Referral    Iron deficiency        Relevant Orders    Ferritin    Iron            Depression Screening Follow Up        5/23/2024    11:38 AM   PHQ   PHQ-9 Total Score 15   Q9: Thoughts of better off dead/self-harm past 2 weeks Not at all           Follow Up Actions Taken  Crisis resource information provided in After Visit Summary       Counseling  Reviewed preventive health counseling, as reflected in patient instructions        She reports that she quit smoking about 15 years ago. She has never used smokeless tobacco.      Appropriate preventive services were discussed with this patient, including applicable screening as appropriate for fall prevention, nutrition, physical activity, Tobacco-use cessation, weight loss and cognition.  Checklist reviewing preventive services available has been given to the patient.    Reviewed patients plan of care and provided an AVS. The Basic Care Plan (routine screening as documented in Health Maintenance) for Jackie meets the Care Plan requirement. This Care Plan has been established and reviewed with the Patient.              Signed Electronically by: Jennifer Light PA-C    Identified Health Risks  I have reviewed Opioid Use Disorder and Substance Use Disorder risk factors and made any needed referrals.   Answers submitted by the patient for this visit:  Patient Health Questionnaire (Submitted on 5/23/2024)  If you checked off any problems, how  difficult have these problems made it for you to do your work, take care of things at home, or get along with other people?: Somewhat difficult  PHQ9 TOTAL SCORE: 15

## 2024-05-23 NOTE — ASSESSMENT & PLAN NOTE
She has a history of vitamin D deficiency and bariatric surgery.  Will check vitamin D level at this time.

## 2024-05-23 NOTE — ASSESSMENT & PLAN NOTE
Treated with sertraline.  Has been under a lot of stressors in recent years.  Her boyfriend was murdered 2 years ago.  She was then evicted from her place.  She recently got at home and lives in [place].  Her daughter was also killed in a car accident.  She is not currently receiving psychotherapy.  This was offered but she declined today.

## 2024-05-23 NOTE — COMMUNITY RESOURCES LIST (ENGLISH)
May 23, 2024           YOUR PERSONALIZED LIST OF SERVICES & PROGRAMS           NAVIGATION    Eligibility Screening      [facility] Insurance Application Program  [address] [place] [address] (Distance: 0.4 miles)  Language: English  Fee: Free  Accessibility: Translation services      [facility] application assistance  [address] [facility] [address] [place] [address] (Distance: 0.4 miles)  Phone: [phone]  Language: English  Fee: Free  Accessibility: Translation services      [employer] [place] - SNAP (formerly food stamps) Screening and Application help  Phone: ([phone] [phone]  Website: https://www.[employer]al[employer].org/programs/[place]-food-helpline/  Language: English  Hours: Mon 10:00 AM - 5:00 PM Tue 10:00 AM - 5:00 PM Wed 10:00 AM - 5:00 PM Thu 10:00 AM - 5:00 PM Fri 10:00 AM - 5:00 PM  Fee: Free  Accessibility: Ada accessible, Blind accommodation, Deaf or hard of hearing, Translation services        ASSISTANCE    Nutrition Benefits      [facility] application assistance  [address] [place] [address] (Distance: 0.4 miles)  Language: English  Fee: Free  Accessibility: Translation services      [facility] application assistance  [address] [place] [address] [address] [place] [address] (Distance: 0.4 miles)  Phone: [phone]  Language: English  Fee: Free  Accessibility: Translation services      [employer] [place] - SNAP (formerly food stamps) Screening and Application help  Phone: ([phone]  Website: https://www.[employer].org/programs/[place]-food-helpline/  Language: English  Hours: Mon 10:00 AM - 5:00 PM Tue 10:00 AM - 5:00 PM Wed 10:00 AM - 5:00 PM Thu 10:00 AM - 5:00 PM Fri 10:00 AM - 5:00 PM  Fee: Free  Accessibility: Ada accessible, Blind accommodation, Deaf or hard of hearing, Translation services    [employer]      Outreach - Food Shelf  [address] [place] [address] (Distance: 1.4 miles)  Phone: [phone]  [phone]  Website: http://[facility].org/  Language: English, Uzbek  Fee: Free  Accessibility: Ada accessible, Translation services      [employer] - Family Pathways Food Shelves  [address] [place] [address] (Distance: 18.6 miles)  Phone: [phone]  Website: https://www.[facility].org/our-work/  Language: English  Fee: Free  Accessibility: Ada accessible, Deaf or hard of hearing, Translation services      [employer] - [place] [employer] Food Shelf  Phone: [phone]  Website: www.[employer].org  Language: English, Uzbek  Hours: Mon 9:00 AM - 3:30 PM Tue 9:00 AM - 6:30 PM Wed 9:00 AM - 3:30 PM Thu 9:00 AM - 12:30 PM Fri 9:00 AM - 12:30 PM Sat 9:00 AM - 12:00 PM  Fee: Free            Medical Transportation, (NEMT)      [facility] Nurse Program - Transportation to medical appointments  [address] [place] [address] (Distance: 19.6 miles)  Phone: [phone]  Website: http://[facility].org/  Language: English, Uzbek, Turkmen  Fee: Sliding scale  Accessibility: Translation services      [employer] - Transportation to medical appointments  [address] [address] (Distance: 14.9 miles)  Phone: [phone]  Website: https://www.[employer]/  Language: English  Fee: Self pay, Insurance      Social Service [facility] - Neighbor to Neighbor Program  Phone: (556) [phone]  Email: [email or web address]  Website: https://www.[facility].org/services/older-adults/-services/neighbor-to-neighbor  Language: English  Hours: Mon 8:00 AM - 5:00 PM Tue 8:00 AM - 5:00 PM Wed 8:00 AM - 5:00 PM Thu 8:00 AM - 5:00 PM Fri 8:00 AM - 5:00 PM  Fee: Insurance, Self pay  Accessibility: Deaf or hard of hearing, Blind accommodation, Translation services    Expense Assistance      [place] - Transit Link  [address] [place] [address] (Distance: 15.2 miles)  Phone: [phone]) [phone]  Website:  https://[employer]/Transportation/Services/Transit-Link.aspx  Language: English  Fee: Self pay      [employer] - [place] - Transit Assistance Program (TAP) - Transportation expense assistance  [address] [place] [address] (Distance: 15.3 miles)  Language: English, Chinese  Fee: Free, Sliding scale, Self pay  Accessibility: Translation services      - Dislocated Worker/Adult [employer] Employment Program  Phone: (297) 507-7334  Email: daxa@[employer]  Website: https://[employer]/services/employment-services/dislocated-worker-program/  Language: English, Egyptian  Hours: Mon 8:00 AM - 4:30 PM Tue 8:00 AM - 4:30 PM Wed 8:00 AM - 4:30 PM Thu 8:00 AM - 4:30 PM Fri 8:00 AM - 4:30 PM  Fee: Free  Accessibility: Ada accessible    Coordination      [employer] [place] - Gas vouchers and transportation assistance - Free or low-cost transportation  [address] [place] [address] (Distance: 10.6 miles)  Phone: [phone]) [phone]  Language: English  Fee: Free  Accessibility: Ada accessible      [facility]  [address] [place] [address] (Distance: 0.4 miles)  Language: English  Fee: Free  Accessibility: Translation services      [employer] Transportation - How [employer] works  Phone: (145) 322-5087  Website: https://www.[employer]ified/members/shop-plans/[place]-health-care-programs/blueride-transportation  Language: English  Hours: Mon 8:00 AM - 5:00 PM Tue 8:00 AM - 5:00 PM Wed 8:00 AM - 5:00 PM Thu 8:00 AM - 5:00 PM Fri 8:00 AM - 5:00 PM               IMPORTANT NUMBERS & WEBSITES        Emergency Services  911  .   United Way  211 http://211unitedway.org  .   Poison Control  (638) 874-6536 http://mnpoison.org http://wisconsinpoison.org  .     Suicide and Crisis Lifeline  988 http://988lifeline.org  .   Childhelp National Child Abuse Hotline  806.210.5712 http://Childhelphotline.org   .   National Sexual Assault Hotline  (608) 351-3129 (HOPE) http://Rainn.org   .     National Runaway  Safeline  (791) 471-3909 (RUNAWAY) http://Stream[employer] ComputingruTrueInsider.org  .   Pregnancy & Postpartum Support  Call/text 485-767-6047  [place]: http://ppsupportmn.org  [place]: http://psichapters.com/wi  .   Substance Abuse National Helpline ([facility])  482-895-HELP (8850) http://Findtreatment.gov   .                DISCLAIMER: These resources have been generated via the rVue Platform. rVue does not endorse any service providers mentioned in this resource list. rVue does not guarantee that the services mentioned in this resource list will be available to you or will improve your health or wellness.    [facility]

## 2024-05-23 NOTE — ASSESSMENT & PLAN NOTE
Pap:    No longer indicated due to history of hysterectomy.  Mammogram:   Due.  Colon cancer screen:    Due 2026.  Immunizations:   Due to zoster, RSV and COVID.  Lipids:    Due 2025.   Glucose:    Due 2025.   Dexa:   Will begin DEXA scans at age 65.

## 2024-05-23 NOTE — PATIENT INSTRUCTIONS
"Preventive Care Advice   This is general advice we often give to help people stay healthy. Your care team may have specific advice just for you. Please talk to your care team about your own preventive care needs.  Lifestyle  Exercise at least 150 minutes each week (30 minutes a day, 5 days a week).  Do muscle strengthening activities 2 days a week. These help control your weight and prevent disease.  No smoking.  Wear sunscreen to prevent skin cancer.  Have your home tested for radon every 2 to 5 years. Radon is a colorless, odorless gas that can harm your lungs. To learn more, go to www.health.[facility].mn. and search for \"Radon in Homes.\"  Keep guns unloaded and locked up in a safe place like a safe or gun vault, or, use a gun lock and hide the keys. Always lock away bullets separately. To learn more, visit [employer].mn.gov and search for \"safe gun storage.\"  Nutrition  Eat 5 or more servings of fruits and vegetables each day.  Try wheat bread, brown rice and whole grain pasta (instead of white bread, rice, and pasta).  Get enough calcium and vitamin D. Check the label on foods and aim for 100% of the RDA (recommended daily allowance).  Regular exams  Have a dental exam and cleaning every 6 months.  See your health care team every year to talk about:  Any changes in your health.  Any medicines your care team has prescribed.  Preventive care, family planning, and ways to prevent chronic diseases.  Shots (vaccines)   HPV shots (up to age 26), if you've never had them before.  Hepatitis B shots (up to age 59), if you've never had them before.  COVID-19 shot: Get this shot when it's due.  Flu shot: Get a flu shot every year.  Tetanus shot: Get a tetanus shot every 10 years.  Pneumococcal, hepatitis A, and RSV shots: Ask your care team if you need these based on your risk.  Shingles shot (for age 50 and up).  General health tests  Diabetes screening:  Starting at age 35, Get screened for diabetes at least every 3 years.  If " you are younger than age 35, ask your care team if you should be screened for diabetes.  Cholesterol test: At age 39, start having a cholesterol test every 5 years, or more often if advised.  Bone density scan (DEXA): At age 50, ask your care team if you should have this scan for osteoporosis (brittle bones).  Hepatitis C: Get tested at least once in your life.  Abdominal aortic aneurysm screening: Talk to your doctor about having this screening if you:  Have ever smoked; and  Are biologically male; and  Are between the ages of 65 and 75.  STIs (sexually transmitted infections)  Before age 24: Ask your care team if you should be screened for STIs.  After age 24: Get screened for STIs if you're at risk. You are at risk for STIs (including HIV) if:  You are sexually active with more than one person.  You don't use condoms every time.  You or a partner was diagnosed with a sexually transmitted infection.  If you are at risk for HIV, ask about PrEP medicine to prevent HIV.  Get tested for HIV at least once in your life, whether you are at risk for HIV or not.  Cancer screening tests  Cervical cancer screening: If you have a cervix, begin getting regular cervical cancer screening tests at age 21. Most people who have regular screenings with normal results can stop after age 65. Talk about this with your provider.  Breast cancer scan (mammogram): If you've ever had breasts, begin having regular mammograms starting at age 40. This is a scan to check for breast cancer.  Colon cancer screening: It is important to start screening for colon cancer at age 45.  Have a colonoscopy test every 10 years (or more often if you're at risk) Or, ask your provider about stool tests like a FIT test every year or Cologuard test every 3 years.  To learn more about your testing options, visit: www.Screwpulp/709906.pdf.  For help making a decision, visit: evelia/ni57900.  Prostate cancer screening test: If you have a prostate and are age 55  to 69, ask your provider if you would benefit from a yearly prostate cancer screening test.  Lung cancer screening: If you are a current or former smoker age 50 to 80, ask your care team if ongoing lung cancer screenings are right for you.  For informational purposes only. Not to replace the advice of your health care provider. Copyright   2023 [employer]. All rights reserved. Clinically reviewed by the [facility] Transitions Program. [employer] 507339 - REV 04/24.

## 2024-05-23 NOTE — ASSESSMENT & PLAN NOTE
Reports that she fell off a stepladder 6 weeks ago.  She sustained a hematoma to the right chest wall.  Exam today demonstrates a very 1 cm hematoma on the right chest wall.  Explained that this should resolve over the next several months.  No indicated at this time.

## 2024-05-23 NOTE — ASSESSMENT & PLAN NOTE
RLS treated with ropinirole.  Continues to have symptoms on current dose.  We will increase to 0.5 mg nightly.

## 2024-05-23 NOTE — ASSESSMENT & PLAN NOTE
She has been experiencing bilateral shoulder pain for the past 2 months.  She has difficulty lifting her arms above her head.  No known injury.    Proceed with x-rays of both shoulders today.  She does have impaired range of motion of both shoulders.  This may be a labrum tear, rotator cuff tear or arthritic changes.  Will await results of x-ray.  She may benefit from physical therapy or referral to orthopedist.  In the meantime I have recommended Tylenol 1000 mg 3 times a day.  She may apply ice or heat.

## 2024-05-24 LAB
ALBUMIN SERPL BCG-MCNC: 4.6 G/DL (ref 3.5–5.2)
ALP SERPL-CCNC: 138 U/L (ref 40–150)
ALT SERPL W P-5'-P-CCNC: 31 U/L (ref 0–50)
ANION GAP SERPL CALCULATED.3IONS-SCNC: 10 MMOL/L (ref 7–15)
AST SERPL W P-5'-P-CCNC: 35 U/L (ref 0–45)
BILIRUB SERPL-MCNC: 0.2 MG/DL
BUN SERPL-MCNC: 17.1 MG/DL (ref 8–23)
CALCIUM SERPL-MCNC: 9.6 MG/DL (ref 8.8–10.2)
CHLORIDE SERPL-SCNC: 103 MMOL/L (ref 98–107)
CREAT SERPL-MCNC: 1.24 MG/DL (ref 0.51–0.95)
DEPRECATED HCO3 PLAS-SCNC: 29 MMOL/L (ref 22–29)
EGFRCR SERPLBLD CKD-EPI 2021: 50 ML/MIN/1.73M2
FERRITIN SERPL-MCNC: 19 NG/ML (ref 11–328)
GLUCOSE SERPL-MCNC: 65 MG/DL (ref 70–99)
IRON SERPL-MCNC: 51 UG/DL (ref 37–145)
MAGNESIUM SERPL-MCNC: 2.6 MG/DL (ref 1.7–2.3)
POTASSIUM SERPL-SCNC: 4.4 MMOL/L (ref 3.4–5.3)
PROT SERPL-MCNC: 7.2 G/DL (ref 6.4–8.3)
SODIUM SERPL-SCNC: 142 MMOL/L (ref 135–145)
T4 FREE SERPL-MCNC: 0.31 NG/DL (ref 0.9–1.7)
TSH SERPL DL<=0.005 MIU/L-ACNC: 115 UIU/ML (ref 0.3–4.2)
VIT B12 SERPL-MCNC: 389 PG/ML (ref 232–1245)
VIT D+METAB SERPL-MCNC: 11 NG/ML (ref 20–50)

## 2024-05-27 RX ORDER — ERGOCALCIFEROL 1.25 MG/1
50000 CAPSULE, LIQUID FILLED ORAL WEEKLY
Qty: 6 CAPSULE | Refills: 0 | Status: SHIPPED | OUTPATIENT
Start: 2024-05-27

## 2024-05-28 ENCOUNTER — TELEPHONE (OUTPATIENT)
Dept: FAMILY MEDICINE | Facility: CLINIC | Age: 59
End: 2024-05-28
Payer: COMMERCIAL

## 2024-05-28 NOTE — TELEPHONE ENCOUNTER
Left message to call back for:  Doroteo  Information to relay to patient:  Please transfer patient to RN upon return call, see and relay lab/imaging results and inquire on provider questions below.        Beata Miller RN  [facility] [place]     ==============================================    ----- Message from Jennifer Light PA-C sent at 5/27/2024  2:09 PM CDT -----  Please let patient know that her thyroid testing shows that it is very elevated at 113.  Is she taking her levothyroxine?  If so we need to make a dose adjustment.  Please let me know.    Her vitamin D level is low at 11.  She should begin vitamin D 50,000 units weekly for 6 weeks.  This will be sent to her pharmacy.  We should repeat vitamin D level in 8 weeks.    Please let her know that her magnesium level is high.  Please inquire if she is taking any supplements that contain magnesium.  Please let me know so I can determine how to follow this up.    Her creatinine level is high.  This could be due to not consuming enough liquids.  I would recommend that she stay well-hydrated and repeat a BMP in 1 week.  Please lynda this up for me.    Her ferritin level is at the low end of normal at 19.  If she taking any iron at this time?  If not I would recommend that she take ferrous sulfate 324 mg every other day.  I would recommend repeating ferritin level in 12 weeks.  Please lynda this up also.  She should take this with vitamin C such as orange juice.  Her hemoglobin is normal.      Jennifer Light PA-C  [facility]  I note that patient has not read her portal message.  Please let her know that the x-ray of her shoulder shows a mild arthritic changes.  I think she would benefit from physical therapy.  I do not feel MRI is needed at this time.  Please let me know if she would like to proceed with physical therapy.  Thank you.

## 2024-05-28 NOTE — LETTER
June 5, 2024      Doroteo Rodriguez  [id number] [address] [facility] [address]        Dear ,    We are writing to inform you of your test results.    Your thyroid testing shows that it is very elevated at 113.  Are you taking the levothyroxine?  If so we need to make a dose adjustment.  Please let me know.     Your vitamin D level is low at 11.  You should begin vitamin D 50,000 units weekly for 6 weeks.  This will be sent to your pharmacy.  We should repeat vitamin D level in 8 weeks.     Your magnesium level is high.  Are you taking any supplements that contain magnesium.  Please let me know so I can determine how to follow this up.     Your creatinine level is high.  This could be due to not consuming enough liquids.  I would recommend that you staywell-hydrated and repeat a BMP in 1 week.       Your ferritin level is at the low end of normal at 19.  Are you taking any iron at this time?  If not I would recommend that she take ferrous sulfate 324 mg every other day.  I would recommend repeating ferritin level in 12 weeks.    She should take this with vitamin C such as orange juice.  Your hemoglobin is normal.       Resulted Orders   CBC with Platelets   Result Value Ref Range    WBC Count 5.6 4.0 - 11.0 10e3/uL    RBC Count 3.94 3.80 - 5.20 10e6/uL    Hemoglobin 12.1 11.7 - 15.7 g/dL    Hematocrit 38.4 35.0 - 47.0 %    MCV 98 78 - 100 fL    MCH 30.7 26.5 - 33.0 pg    MCHC 31.5 31.5 - 36.5 g/dL    RDW 13.4 10.0 - 15.0 %    Platelet Count 260 150 - 450 10e3/uL   COMPREHENSIVE METABOLIC PANEL   Result Value Ref Range    Sodium 142 135 - 145 mmol/L      Comment:      Reference intervals for this test were updated on 09/26/2023 to more accurately reflect our healthy population. There may be differences in the flagging of prior results with similar values performed with this method. Interpretation of those prior results can be made in the context of the updated reference intervals.     Potassium 4.4 3.4 - 5.3 mmol/L     Carbon Dioxide (CO2) 29 22 - 29 mmol/L    Anion Gap 10 7 - 15 mmol/L    Urea Nitrogen 17.1 8.0 - 23.0 mg/dL    Creatinine 1.24 (H) 0.51 - 0.95 mg/dL    GFR Estimate 50 (L) >60 mL/min/1.73m2    Calcium 9.6 8.8 - 10.2 mg/dL    Chloride 103 98 - 107 mmol/L    Glucose 65 (L) 70 - 99 mg/dL    Alkaline Phosphatase 138 40 - 150 U/L    AST 35 0 - 45 U/L      Comment:      Reference intervals for this test were updated on 6/12/2023 to more accurately reflect our healthy population. There may be differences in the flagging of prior results with similar values performed with this method. Interpretation of those prior results can be made in the context of the updated reference intervals.    ALT 31 0 - 50 U/L      Comment:      Reference intervals for this test were updated on 6/12/2023 to more accurately reflect our healthy population. There may be differences in the flagging of prior results with similar values performed with this method. Interpretation of those prior results can be made in the context of the updated reference intervals.      Protein Total 7.2 6.4 - 8.3 g/dL    Albumin 4.6 3.5 - 5.2 g/dL    Bilirubin Total 0.2 <=1.2 mg/dL   Ferritin   Result Value Ref Range    Ferritin 19 11 - 328 ng/mL   Folate   Result Value Ref Range    Folic Acid 9.6 4.6 - 34.8 ng/mL   Iron   Result Value Ref Range    Iron 51 37 - 145 ug/dL   TSH with free T4 reflex   Result Value Ref Range    .00 (H) 0.30 - 4.20 uIU/mL   Magnesium   Result Value Ref Range    Magnesium 2.6 (H) 1.7 - 2.3 mg/dL   Vitamin B12   Result Value Ref Range    Vitamin B12 389 232 - 1,245 pg/mL   Vitamin D Deficiency   Result Value Ref Range    Vitamin D, Total (25-Hydroxy) 11 (L) 20 - 50 ng/mL      Comment:      mild to moderate deficiency    Narrative    Season, race, dietary intake, and treatment affect the concentration of 25-hydroxy-Vitamin D. Values may decrease during winter months and increase during summer months.    Vitamin D determination is  routinely performed by an immunoassay specific for 25 hydroxyvitamin D3.  If an individual is on vitamin D2(ergocalciferol) supplementation, please specify 25 OH vitamin D2 and D3 level determination by LCMSMS test VITD23.     T4 free   Result Value Ref Range    Free T4 0.31 (L) 0.90 - 1.70 ng/dL       If you have any questions or concerns, please call the clinic at the number listed above.       Sincerely,

## 2024-05-31 NOTE — TELEPHONE ENCOUNTER
Left message to call back for: patient   Information to relay to patient: transfer to [facility] RN line upon return call to discuss providers message below    ----- Message from Jennifer Light PA-C sent at 5/27/2024  2:09 PM CDT -----  Please let patient know that her thyroid testing shows that it is very elevated at 113.  Is she taking her levothyroxine?  If so we need to make a dose adjustment.  Please let me know.     Her vitamin D level is low at 11.  She should begin vitamin D 50,000 units weekly for 6 weeks.  This will be sent to her pharmacy.  We should repeat vitamin D level in 8 weeks.     Please let her know that her magnesium level is high.  Please inquire if she is taking any supplements that contain magnesium.  Please let me know so I can determine how to follow this up.     Her creatinine level is high.  This could be due to not consuming enough liquids.  I would recommend that she stay well-hydrated and repeat a BMP in 1 week.  Please lynda this up for me.     Her ferritin level is at the low end of normal at 19.  If she taking any iron at this time?  If not I would recommend that she take ferrous sulfate 324 mg every other day.  I would recommend repeating ferritin level in 12 weeks.  Please lynda this up also.  She should take this with vitamin C such as orange juice.  Her hemoglobin is normal.        Jennifer Light PA-C  [facility]  I note that patient has not read her portal message.  Please let her know that the x-ray of her shoulder shows a mild arthritic changes.  I think she would benefit from physical therapy.  I do not feel MRI is needed at this time.  Please let me know if she would like to proceed with physical therapy.  Thank you.

## 2024-06-04 NOTE — TELEPHONE ENCOUNTER
Left message to call back for:  Doroteo   Information to relay to patient:  Please transfer to [facility] RN line upon return call to discuss providers message below.      Of note, per chart review patient noted to have 'seen' all lab results from 5/23/24 except their Folate and Ferritin results; however, patient has been unreachable x3.      Routed to Jennifer Light PA-C to see if they want a letter mailed to patient's home in addition to patient seeing MyChart results.      Beata Miller RN  [facility]

## 2024-06-16 ENCOUNTER — HEALTH MAINTENANCE LETTER (OUTPATIENT)
Age: 59
End: 2024-06-16

## 2024-07-17 ENCOUNTER — MEDICAL CORRESPONDENCE (OUTPATIENT)
Dept: HEALTH INFORMATION MANAGEMENT | Facility: CLINIC | Age: 59
End: 2024-07-17
Payer: COMMERCIAL

## 2024-07-19 DIAGNOSIS — G43.719 INTRACTABLE CHRONIC MIGRAINE WITHOUT AURA: Primary | ICD-10-CM

## 2024-07-19 RX ORDER — MEPERIDINE HYDROCHLORIDE 25 MG/ML
25 INJECTION INTRAMUSCULAR; INTRAVENOUS; SUBCUTANEOUS EVERY 30 MIN PRN
OUTPATIENT
Start: 2024-07-19

## 2024-07-19 RX ORDER — ONDANSETRON 2 MG/ML
8 INJECTION INTRAMUSCULAR; INTRAVENOUS
OUTPATIENT
Start: 2024-07-19

## 2024-07-19 RX ORDER — ALBUTEROL SULFATE 0.83 MG/ML
2.5 SOLUTION RESPIRATORY (INHALATION)
OUTPATIENT
Start: 2024-07-19

## 2024-07-19 RX ORDER — DIHYDROERGOTAMINE MESYLATE 1 MG/ML
1 INJECTION, SOLUTION INTRAMUSCULAR; INTRAVENOUS; SUBCUTANEOUS ONCE
Start: 2024-07-19

## 2024-07-19 RX ORDER — ALBUTEROL SULFATE 90 UG/1
1-2 AEROSOL, METERED RESPIRATORY (INHALATION)
Start: 2024-07-19

## 2024-07-19 RX ORDER — LORAZEPAM 2 MG/ML
1 INJECTION INTRAMUSCULAR ONCE
Start: 2024-07-19 | End: 2024-07-19

## 2024-07-19 RX ORDER — METHYLPREDNISOLONE SODIUM SUCCINATE 125 MG/2ML
125 INJECTION, POWDER, LYOPHILIZED, FOR SOLUTION INTRAMUSCULAR; INTRAVENOUS
Start: 2024-07-19

## 2024-07-19 RX ORDER — EPINEPHRINE 1 MG/ML
0.3 INJECTION, SOLUTION, CONCENTRATE INTRAVENOUS EVERY 5 MIN PRN
OUTPATIENT
Start: 2024-07-19

## 2024-07-19 RX ORDER — DIPHENHYDRAMINE HYDROCHLORIDE 50 MG/ML
50 INJECTION INTRAMUSCULAR; INTRAVENOUS
Start: 2024-07-19

## 2024-12-26 ENCOUNTER — PATIENT OUTREACH (OUTPATIENT)
Dept: CARE COORDINATION | Facility: CLINIC | Age: 59
End: 2024-12-26
Payer: COMMERCIAL

## 2025-01-17 DIAGNOSIS — E03.9 HYPOTHYROIDISM, UNSPECIFIED TYPE: ICD-10-CM

## 2025-01-17 PROBLEM — R29.818 DISABILITY DUE TO NEUROLOGICAL DISORDER: Status: ACTIVE | Noted: 2025-01-17

## 2025-01-20 ENCOUNTER — MYC REFILL (OUTPATIENT)
Dept: FAMILY MEDICINE | Facility: CLINIC | Age: 60
End: 2025-01-20
Payer: COMMERCIAL

## 2025-01-20 ENCOUNTER — PATIENT OUTREACH (OUTPATIENT)
Dept: CARE COORDINATION | Facility: CLINIC | Age: 60
End: 2025-01-20
Payer: COMMERCIAL

## 2025-01-20 DIAGNOSIS — G25.81 RESTLESS LEGS SYNDROME: ICD-10-CM

## 2025-01-20 DIAGNOSIS — E03.9 HYPOTHYROIDISM, UNSPECIFIED TYPE: ICD-10-CM

## 2025-01-20 RX ORDER — LEVOTHYROXINE SODIUM 125 UG/1
TABLET ORAL
Qty: 90 TABLET | Refills: 0 | Status: SHIPPED | OUTPATIENT
Start: 2025-01-20 | End: 2025-01-21 | Stop reason: DRUGHIGH

## 2025-01-20 RX ORDER — ROPINIROLE 0.25 MG/1
0.5 TABLET, FILM COATED ORAL AT BEDTIME
Qty: 60 TABLET | Refills: 11 | OUTPATIENT
Start: 2025-01-20

## 2025-01-20 RX ORDER — LEVOTHYROXINE SODIUM 125 UG/1
TABLET ORAL
Qty: 30 TABLET | Refills: 1 | OUTPATIENT
Start: 2025-01-20

## 2025-01-21 ENCOUNTER — PATIENT OUTREACH (OUTPATIENT)
Dept: CARE COORDINATION | Facility: CLINIC | Age: 60
End: 2025-01-21
Payer: COMMERCIAL

## 2025-01-21 NOTE — PROGRESS NOTES
Clinic Care Coordination Contact  [facility]/Voicemail      Clinical Data: CHW Outreach    Outreach attempted x 1 regarding CCC enrollment.  Left message on patient's voicemail with call back information and requested return call.    Plan: CHW will attempt another outreach to the patient via phone regarding enrollment into CCC in 1-2 business days.    ALIDA Hector  Clinic Care Coordination   [facility]   Phone: [phone]  [email or web address]

## 2025-01-21 NOTE — LETTER
[facility] CARE COORDINATION    January 22, 2025    Jackie Rodriguez  [address] [address] [place] [address]      Dear Doroteo,    I am a clinic community health worker who works with No primary care provider on file. with the [facility]. I have been trying to reach you recently to introduce Clinic Care Coordination. Below is a description of clinic care coordination and how I can further assist you.       The clinic care coordination team is made up of a registered nurse, , financial resource worker and community health worker who understand the health care system. The goal of clinic care coordination is to help you manage your health and improve access to the health care system. Our team works alongside your provider to assist you in determining your health and social needs. We can help you obtain health care and community resources, providing you with necessary information and education. We can work with you through any barriers and develop a care plan that helps coordinate and strengthen the communication between you and your care team.  Our services are voluntary and are offered without charge to you personally.    Please feel free to contact Community Health Worker, Malick [phone] with any questions or concerns regarding care coordination and what we can offer.      We are focused on providing you with the highest-quality healthcare experience possible.    Sincerely,     Ashley Ramirez MINA  Clinic Care Coordination   [facility]

## 2025-01-22 NOTE — PROGRESS NOTES
Clinic Care Coordination Contact  [facility]/Voicemail      Clinical Data: CHW Outreach    Outreach attempted x 2 regarding CCC enrollment.  Left message on patient's voicemail with call back information and requested return call.    Plan: Patient has been sent a unreachable letter via [employer] and was provided with CHW contact information if they are interested in accessing Clinic Care Coordination.         Order for Care Management has been closed, no further outreach will be done at this time and patient can be re-referred.    Ashley Ramirez, HEATHW  Clinic Care Coordination   [facility] [place] [facility]   Phone: [phone]  [email or web address]

## 2025-02-25 ENCOUNTER — OFFICE VISIT (OUTPATIENT)
Dept: FAMILY MEDICINE | Facility: CLINIC | Age: 60
End: 2025-02-25
Payer: COMMERCIAL

## 2025-02-25 VITALS
OXYGEN SATURATION: 100 % | HEART RATE: 76 BPM | TEMPERATURE: 97.5 F | WEIGHT: 137 LBS | RESPIRATION RATE: 14 BRPM | SYSTOLIC BLOOD PRESSURE: 130 MMHG | DIASTOLIC BLOOD PRESSURE: 87 MMHG | BODY MASS INDEX: 25.21 KG/M2 | HEIGHT: 62 IN

## 2025-02-25 DIAGNOSIS — E66.3 OVERWEIGHT WITH BODY MASS INDEX (BMI) OF 25 TO 25.9 IN ADULT: Primary | ICD-10-CM

## 2025-02-25 DIAGNOSIS — E03.9 HYPOTHYROIDISM, UNSPECIFIED TYPE: ICD-10-CM

## 2025-02-25 DIAGNOSIS — F33.1 MODERATE EPISODE OF RECURRENT MAJOR DEPRESSIVE DISORDER (H): ICD-10-CM

## 2025-02-25 DIAGNOSIS — G25.81 RESTLESS LEGS SYNDROME: ICD-10-CM

## 2025-02-25 DIAGNOSIS — Z78.0 MENOPAUSE: ICD-10-CM

## 2025-02-25 DIAGNOSIS — M54.50 CHRONIC BILATERAL LOW BACK PAIN, UNSPECIFIED WHETHER SCIATICA PRESENT: ICD-10-CM

## 2025-02-25 DIAGNOSIS — G89.29 CHRONIC BILATERAL LOW BACK PAIN, UNSPECIFIED WHETHER SCIATICA PRESENT: ICD-10-CM

## 2025-02-25 DIAGNOSIS — F19.11 HISTORY OF DRUG ABUSE (H): ICD-10-CM

## 2025-02-25 DIAGNOSIS — F41.1 GAD (GENERALIZED ANXIETY DISORDER): ICD-10-CM

## 2025-02-25 PROCEDURE — 84439 ASSAY OF FREE THYROXINE: CPT | Performed by: FAMILY MEDICINE

## 2025-02-25 PROCEDURE — 3079F DIAST BP 80-89 MM HG: CPT | Performed by: FAMILY MEDICINE

## 2025-02-25 PROCEDURE — 99214 OFFICE O/P EST MOD 30 MIN: CPT | Performed by: FAMILY MEDICINE

## 2025-02-25 PROCEDURE — 36415 COLL VENOUS BLD VENIPUNCTURE: CPT | Performed by: FAMILY MEDICINE

## 2025-02-25 PROCEDURE — 84443 ASSAY THYROID STIM HORMONE: CPT | Performed by: FAMILY MEDICINE

## 2025-02-25 PROCEDURE — 3075F SYST BP GE 130 - 139MM HG: CPT | Performed by: FAMILY MEDICINE

## 2025-02-25 SDOH — HEALTH STABILITY: PHYSICAL HEALTH: ON AVERAGE, HOW MANY DAYS PER WEEK DO YOU ENGAGE IN MODERATE TO STRENUOUS EXERCISE (LIKE A BRISK WALK)?: 5 DAYS

## 2025-02-25 ASSESSMENT — ANXIETY QUESTIONNAIRES
1. FEELING NERVOUS, ANXIOUS, OR ON EDGE: MORE THAN HALF THE DAYS
6. BECOMING EASILY ANNOYED OR IRRITABLE: NEARLY EVERY DAY
GAD7 TOTAL SCORE: 16
IF YOU CHECKED OFF ANY PROBLEMS ON THIS QUESTIONNAIRE, HOW DIFFICULT HAVE THESE PROBLEMS MADE IT FOR YOU TO DO YOUR WORK, TAKE CARE OF THINGS AT HOME, OR GET ALONG WITH OTHER PEOPLE: VERY DIFFICULT
GAD7 TOTAL SCORE: 16
7. FEELING AFRAID AS IF SOMETHING AWFUL MIGHT HAPPEN: SEVERAL DAYS
GAD7 TOTAL SCORE: 16
5. BEING SO RESTLESS THAT IT IS HARD TO SIT STILL: NEARLY EVERY DAY
2. NOT BEING ABLE TO STOP OR CONTROL WORRYING: MORE THAN HALF THE DAYS
3. WORRYING TOO MUCH ABOUT DIFFERENT THINGS: MORE THAN HALF THE DAYS
7. FEELING AFRAID AS IF SOMETHING AWFUL MIGHT HAPPEN: SEVERAL DAYS
8. IF YOU CHECKED OFF ANY PROBLEMS, HOW DIFFICULT HAVE THESE MADE IT FOR YOU TO DO YOUR WORK, TAKE CARE OF THINGS AT HOME, OR GET ALONG WITH OTHER PEOPLE?: VERY DIFFICULT
4. TROUBLE RELAXING: NEARLY EVERY DAY

## 2025-02-25 ASSESSMENT — PATIENT HEALTH QUESTIONNAIRE - PHQ9
SUM OF ALL RESPONSES TO PHQ QUESTIONS 1-9: 10
10. IF YOU CHECKED OFF ANY PROBLEMS, HOW DIFFICULT HAVE THESE PROBLEMS MADE IT FOR YOU TO DO YOUR WORK, TAKE CARE OF THINGS AT HOME, OR GET ALONG WITH OTHER PEOPLE: VERY DIFFICULT
SUM OF ALL RESPONSES TO PHQ QUESTIONS 1-9: 10

## 2025-02-25 ASSESSMENT — SOCIAL DETERMINANTS OF HEALTH (SDOH): HOW OFTEN DO YOU GET TOGETHER WITH FRIENDS OR RELATIVES?: MORE THAN THREE TIMES A WEEK

## 2025-02-25 NOTE — PROGRESS NOTES
"  Assessment & Plan  Overweight with body mass index (BMI) of 25 to 25.9 in adult  Overweight with bmi 25.47 and s/p bariatric surgery.         Hypothyroidism, unspecified type  Hypothyroidism with current levothyroxine 175 mcg orally per day-due for recheck  Tsh- need to recheck and titrate med prn.          Restless legs syndrome  Restless legs   - requip 0.25 - 0.5mg q hs -continue         JOHN (generalized anxiety disorder)  Uncontrolled depression and anxiety and hx of meth use (sober since 2008)   Wants to live and be healthy-for her son  She says she needs referral back to psychiatry and psychology.          Moderate episode of recurrent major depressive disorder (H)  Uncontrolled depression and anxiety and hx of meth use (sober since 2008)   She says she doesn't have any plan to harm self or others due to needing to be here for her son.   She says she needs referral back to psychiatry and psychology.          History of drug abuse meth sober since 2007  Sober from meth since 2008.          Menopause  She wonders if she can get dexa, fam hx of osteoporosis. Dexa ordered.                     Subjective   Doroteo is a 59 year old, presenting for the following health issues:  Establish Care, feels like she is gaining weight, and Radiology Visit          Objective    /87 (BP Location: Left arm, Patient Position: Left side, Cuff Size: Adult Regular)   Pulse 76   Temp 97.5  F (36.4  C) (Oral)   Resp 14   Ht 1.562 m (5' 1.5\")   Wt 62.1 kg (137 lb)   SpO2 100%   BMI 25.47 kg/m    Body mass index is 25.47 kg/m .  Physical Exam  Constitutional:       Appearance: Normal appearance.   HENT:      Head: Normocephalic and atraumatic.   Cardiovascular:      Rate and Rhythm: Normal rate and regular rhythm.   Pulmonary:      Effort: Pulmonary effort is normal.   Abdominal:      Tenderness: There is no abdominal tenderness.   Musculoskeletal:         General: Normal range of motion.      Cervical back: Normal range of " motion and neck supple.      Lumbar back: No tenderness or bony tenderness.   Neurological:      General: No focal deficit present.      Mental Status: She is alert and oriented to person, place, and time.                Signed Electronically by: Kelly Reyna MD    Answers submitted by the patient for this visit:  Patient Health Questionnaire (Submitted on 2/25/2025)  If you checked off any problems, how difficult have these problems made it for you to do your work, take care of things at home, or get along with other people?: Very difficult  PHQ9 TOTAL SCORE: 10  Patient Health Questionnaire (G7) (Submitted on 2/25/2025)  JOHN 7 TOTAL SCORE: 16  Back Pain Visit Questionnaire (Submitted on 2/25/2025)  Your back pain is: new  New Back Pain Visit Questionnaire  (Submitted on 2/25/2025)  What do you think is the original cause of your back pain?: other  When did you first notice your back pain? : more than 1 month ago  How would you describe your back pain? : burning, cramping, fullness  How often do you feel your back pain? : 2 to 4 times per day  Where is your back pain located? : right middle of back, left middle of back  Where does your back pain spread? : nowhere  Since you noticed your back pain, how has it changed? : always present, but gets better and worse  Does your back pain interfere with your job?: Not applicable  On a scale of 1-10 (10 being the worst), how strong is your back pain?: 10  What makes your back pain worse? : other  Acupuncture:: not tried  Acetaminophen: not tried  Activity or Exercise: not helpful  Chiropractor: not tried  Cold: not tried  Heat: not tried  Massage: not helpful  Muscle relaxants : not tried  NSAIDS (Ibuprofen, Naproxen) : not helpful  Opioids: not tried  Physical Therapy: not helpful  Rest: helpful  Steroid Injection: not tried  Stretching : helpful  Surgery: not tried  TENS Unit: not tried  Topical pain relievers : not helpful  Do you see any other healthcare providers  for your back pain? : None  General Questionnaire (Submitted on 2/25/2025)  Chief Complaint: Chronic problems general questions HPI Form  How many days per week do you miss taking your medication?: 5  What makes it hard for you to take your medication every day?: remembering to take  Questionnaire about: Chronic problems general questions HPI Form (Submitted on 2/25/2025)  Chief Complaint: Chronic problems general questions HPI Form

## 2025-02-25 NOTE — ASSESSMENT & PLAN NOTE
Chronic low back pain ongoing x 4 months.   No inciting event.   Describes burning pain in the low back.   She says stooping over helps.   Standing up straight hurts.   Radiates from low back around to abdomen.   Walking hurts so she is now using a cane to help herself stand more up right.   Will refer to spine center. Likely needs physical therapy   No point tenderness on exam, pain is diffuse in low back. On abdominal exam abdomen is soft with no tenderness.

## 2025-02-25 NOTE — ASSESSMENT & PLAN NOTE
Uncontrolled depression and anxiety and hx of meth use (sober since 2008)   Wants to live and be healthy-for her son  She says she needs referral back to psychiatry and psychology.

## 2025-02-25 NOTE — ASSESSMENT & PLAN NOTE
Uncontrolled depression and anxiety and hx of meth use (sober since 2008)   She says she doesn't have any plan to harm self or others due to needing to be here for her son.   She says she needs referral back to psychiatry and psychology.

## 2025-02-25 NOTE — ASSESSMENT & PLAN NOTE
Hypothyroidism with current levothyroxine 175 mcg orally per day-due for recheck  Tsh- need to recheck and titrate med prn.

## 2025-02-26 ENCOUNTER — PATIENT OUTREACH (OUTPATIENT)
Dept: CARE COORDINATION | Facility: CLINIC | Age: 60
End: 2025-02-26
Payer: COMMERCIAL

## 2025-02-26 LAB
T4 FREE SERPL-MCNC: <0.1 NG/DL (ref 0.9–1.7)
TSH SERPL DL<=0.005 MIU/L-ACNC: 172 UIU/ML (ref 0.3–4.2)

## 2025-02-26 NOTE — PROGRESS NOTES
Clinic Care Coordination Contact  [facility]/Voicemail      Clinical Data: [facility]W Outreach    Outreach attempted x 1 regarding CCC enrollment.  Left message on patient's voicemail with call back information and requested return call.    Plan: CHW will attempt another outreach to the patient via phone regarding enrollment into CCC in 1-2 business days.    ALIDA Hector  Clinic Care Coordination   [facility]   Phone: [phone]  [email or web address]

## 2025-02-27 ENCOUNTER — TELEPHONE (OUTPATIENT)
Dept: FAMILY MEDICINE | Facility: CLINIC | Age: 60
End: 2025-02-27
Payer: COMMERCIAL

## 2025-02-27 NOTE — TELEPHONE ENCOUNTER
----- Message from Kelly Reyna sent at 2/26/2025  5:28 PM CST -----  Please check that this patient is taking levothyroxine as directed 175 mcg daily. If so, let me know because I Will need to titrate med up

## 2025-02-27 NOTE — TELEPHONE ENCOUNTER
Left message to call back for: Patient  Information to relay to patient: Please relay provider message below. Please route patient's response to provider

## 2025-02-27 NOTE — PROGRESS NOTES
Clinic Care Coordination Contact  [facility]/Voicemail    Clinical Data: Care Coordinator Outreach    Outreach Documentation Number of Outreach Attempt   2/27/2025   8:55 AM 2       Left message on patient's voicemail with call back information and requested return call.      Plan: Care Coordinator will try to reach patient again in 1-2 business days.    ALIDA Torres   [phone]  Clinic Care Coordination  [facility]

## 2025-02-28 NOTE — TELEPHONE ENCOUNTER
Left message x2  Information to relay to patient: Please relay provider message below. Please route patient's response to provider

## 2025-03-26 ENCOUNTER — TELEPHONE (OUTPATIENT)
Dept: FAMILY MEDICINE | Facility: CLINIC | Age: 60
End: 2025-03-26
Payer: COMMERCIAL

## 2025-03-26 DIAGNOSIS — E03.9 HYPOTHYROIDISM, UNSPECIFIED TYPE: Primary | ICD-10-CM

## 2025-03-26 NOTE — TELEPHONE ENCOUNTER
"Called patient back and relayed below message from Dr. Reyna as written.      Doroteo states she's \"maybe\" taken her levothyroxine consistently the last 2 weeks, missing a dose \"here or there.\"  She reports having set up a medication vega through [employer] on her phone today, that will give her daily reminders to take her medication every day going forward.      Confirmed she took her levothyroxine today and will start taking it consistently for the next 4-6 weeks and have labs rechecked at that time.      RN scheduled lab only visit on 5/7/25, which is 6 weeks from today for repeat labs.        Labs pended for review/approval, if appropriate.        Beata Miller RN  [facility]       "

## 2025-03-26 NOTE — TELEPHONE ENCOUNTER
Okay once she has been taking her levothyroxine consistently lets recheck her TSH 4 to 6 weeks after that.  When will that be?

## 2025-03-26 NOTE — TELEPHONE ENCOUNTER
"Called patient and relayed below message from Dr. Reyna as written.  Patient states prior to her lab draw a month ago she had missed \"a lot\" of her levothyroxine, but since then she's been taking it daily with the exception of the last couple of days, which she's missed.      RN educated on the importance of taking her medication every day.  Reviewed ways to remember to take her medication, including keeping it on her nightstand next to her bed to take first thing in the morning (30-60 minutes prior to eating or taking any other medication), or setting an alarm every morning to remind her to take it, as a couple options.  Patient appreciative of ideas and willing to try for better adherence.  Will send update to Dr. Reyna and call patient back with any other recommendations.     Routing update to Dr. Reyna.  Please advise on recommendations, repeat labs vs dose increase?        Beata Miller RN  [facility]     ==========================================    ----- Message from Kelly Reyna sent at 3/25/2025  5:56 PM CDT -----  I still have not heard back - see phone encounter, need to confirm if pt is taking levothyroxine either way she needs more levothyroxine so either needs to resume taking (if not taking) or increase if she is taking. See related phone and [employer] messages.       "

## 2025-06-21 ENCOUNTER — HEALTH MAINTENANCE LETTER (OUTPATIENT)
Age: 60
End: 2025-06-21

## 2025-07-12 ENCOUNTER — HOSPITAL ENCOUNTER (INPATIENT)
Facility: CLINIC | Age: 60
LOS: 4 days | Discharge: HOME | End: 2025-07-16
Attending: EMERGENCY MEDICINE | Admitting: COLON & RECTAL SURGERY
Payer: COMMERCIAL

## 2025-07-12 ENCOUNTER — APPOINTMENT (OUTPATIENT)
Dept: GENERAL RADIOLOGY | Facility: CLINIC | Age: 60
End: 2025-07-12
Attending: STUDENT IN AN ORGANIZED HEALTH CARE EDUCATION/TRAINING PROGRAM
Payer: COMMERCIAL

## 2025-07-12 DIAGNOSIS — K56.2 SIGMOID VOLVULUS (H): ICD-10-CM

## 2025-07-12 LAB
ABO + RH BLD: NORMAL
ALBUMIN SERPL BCG-MCNC: 4.4 G/DL (ref 3.5–5.2)
ALP SERPL-CCNC: 139 U/L (ref 40–150)
ALT SERPL W P-5'-P-CCNC: 120 U/L (ref 0–50)
ANION GAP SERPL CALCULATED.3IONS-SCNC: 12 MMOL/L (ref 7–15)
AST SERPL W P-5'-P-CCNC: 94 U/L (ref 0–45)
BASOPHILS # BLD AUTO: 0.1 10E3/UL (ref 0–0.2)
BASOPHILS NFR BLD AUTO: 1 %
BILIRUB SERPL-MCNC: 0.3 MG/DL
BLD GP AB SCN SERPL QL: NEGATIVE
BUN SERPL-MCNC: 15.7 MG/DL (ref 8–23)
CALCIUM SERPL-MCNC: 9.2 MG/DL (ref 8.8–10.4)
CHLORIDE SERPL-SCNC: 104 MMOL/L (ref 98–107)
CREAT SERPL-MCNC: 1.54 MG/DL (ref 0.51–0.95)
EGFRCR SERPLBLD CKD-EPI 2021: 38 ML/MIN/1.73M2
EOSINOPHIL # BLD AUTO: 0.1 10E3/UL (ref 0–0.7)
EOSINOPHIL NFR BLD AUTO: 3 %
ERYTHROCYTE [DISTWIDTH] IN BLOOD BY AUTOMATED COUNT: 21.7 % (ref 10–15)
FLEXIBLE SIGMOIDOSCOPY: NORMAL
GLUCOSE SERPL-MCNC: 75 MG/DL (ref 70–99)
HCO3 SERPL-SCNC: 24 MMOL/L (ref 22–29)
HCT VFR BLD AUTO: 36.6 % (ref 35–47)
HGB BLD-MCNC: 10.7 G/DL (ref 11.7–15.7)
IMM GRANULOCYTES # BLD: 0 10E3/UL
IMM GRANULOCYTES NFR BLD: 0 %
INR PPP: 0.97 (ref 0.85–1.15)
LACTATE SERPL-SCNC: 0.9 MMOL/L (ref 0.7–2)
LYMPHOCYTES # BLD AUTO: 1.7 10E3/UL (ref 0.8–5.3)
LYMPHOCYTES NFR BLD AUTO: 32 %
MCH RBC QN AUTO: 23.4 PG (ref 26.5–33)
MCHC RBC AUTO-ENTMCNC: 29.2 G/DL (ref 31.5–36.5)
MCV RBC AUTO: 80 FL (ref 78–100)
MONOCYTES # BLD AUTO: 0.2 10E3/UL (ref 0–1.3)
MONOCYTES NFR BLD AUTO: 4 %
NEUTROPHILS # BLD AUTO: 3.1 10E3/UL (ref 1.6–8.3)
NEUTROPHILS NFR BLD AUTO: 60 %
NRBC # BLD AUTO: 0 10E3/UL
NRBC BLD AUTO-RTO: 0 /100
PLATELET # BLD AUTO: 331 10E3/UL (ref 150–450)
POTASSIUM SERPL-SCNC: 4.3 MMOL/L (ref 3.4–5.3)
PROT SERPL-MCNC: 7.2 G/DL (ref 6.4–8.3)
PROTHROMBIN TIME: 13.2 SECONDS (ref 11.8–14.8)
RBC # BLD AUTO: 4.57 10E6/UL (ref 3.8–5.2)
SODIUM SERPL-SCNC: 140 MMOL/L (ref 135–145)
SPECIMEN EXP DATE BLD: NORMAL
WBC # BLD AUTO: 5.2 10E3/UL (ref 4–11)

## 2025-07-12 PROCEDURE — 80053 COMPREHEN METABOLIC PANEL: CPT | Performed by: EMERGENCY MEDICINE

## 2025-07-12 PROCEDURE — 86900 BLOOD TYPING SEROLOGIC ABO: CPT | Performed by: EMERGENCY MEDICINE

## 2025-07-12 PROCEDURE — 120N000002 HC R&B MED SURG/OB UMMC

## 2025-07-12 PROCEDURE — 0DNN8ZZ RELEASE SIGMOID COLON, VIA NATURAL OR ARTIFICIAL OPENING ENDOSCOPIC: ICD-10-PCS | Performed by: STUDENT IN AN ORGANIZED HEALTH CARE EDUCATION/TRAINING PROGRAM

## 2025-07-12 PROCEDURE — 85610 PROTHROMBIN TIME: CPT | Performed by: EMERGENCY MEDICINE

## 2025-07-12 PROCEDURE — 74018 RADEX ABDOMEN 1 VIEW: CPT | Mod: 26 | Performed by: RADIOLOGY

## 2025-07-12 PROCEDURE — 36415 COLL VENOUS BLD VENIPUNCTURE: CPT | Performed by: EMERGENCY MEDICINE

## 2025-07-12 PROCEDURE — 83605 ASSAY OF LACTIC ACID: CPT | Performed by: EMERGENCY MEDICINE

## 2025-07-12 PROCEDURE — 250N000013 HC RX MED GY IP 250 OP 250 PS 637

## 2025-07-12 PROCEDURE — 93005 ELECTROCARDIOGRAM TRACING: CPT

## 2025-07-12 PROCEDURE — 74018 RADEX ABDOMEN 1 VIEW: CPT

## 2025-07-12 PROCEDURE — 85025 COMPLETE CBC W/AUTO DIFF WBC: CPT | Performed by: EMERGENCY MEDICINE

## 2025-07-12 PROCEDURE — 99291 CRITICAL CARE FIRST HOUR: CPT | Performed by: EMERGENCY MEDICINE

## 2025-07-12 PROCEDURE — 999N000099 HC STATISTIC MODERATE SEDATION < 10 MIN: Performed by: STUDENT IN AN ORGANIZED HEALTH CARE EDUCATION/TRAINING PROGRAM

## 2025-07-12 PROCEDURE — 99291 CRITICAL CARE FIRST HOUR: CPT | Mod: 25 | Performed by: EMERGENCY MEDICINE

## 2025-07-12 PROCEDURE — 93010 ELECTROCARDIOGRAM REPORT: CPT | Performed by: INTERNAL MEDICINE

## 2025-07-12 PROCEDURE — 250N000011 HC RX IP 250 OP 636: Performed by: STUDENT IN AN ORGANIZED HEALTH CARE EDUCATION/TRAINING PROGRAM

## 2025-07-12 PROCEDURE — 45330 DIAGNOSTIC SIGMOIDOSCOPY: CPT | Performed by: STUDENT IN AN ORGANIZED HEALTH CARE EDUCATION/TRAINING PROGRAM

## 2025-07-12 PROCEDURE — 258N000003 HC RX IP 258 OP 636

## 2025-07-12 RX ORDER — FENTANYL CITRATE 50 UG/ML
INJECTION, SOLUTION INTRAMUSCULAR; INTRAVENOUS PRN
Status: DISCONTINUED | OUTPATIENT
Start: 2025-07-12 | End: 2025-07-12 | Stop reason: HOSPADM

## 2025-07-12 RX ORDER — ROPINIROLE 0.5 MG/1
0.5 TABLET, FILM COATED ORAL AT BEDTIME
Status: DISCONTINUED | OUTPATIENT
Start: 2025-07-12 | End: 2025-07-14

## 2025-07-12 RX ORDER — ACETAMINOPHEN 325 MG/1
650 TABLET ORAL EVERY 4 HOURS PRN
Status: DISCONTINUED | OUTPATIENT
Start: 2025-07-12 | End: 2025-07-12

## 2025-07-12 RX ORDER — SODIUM CHLORIDE, SODIUM LACTATE, POTASSIUM CHLORIDE, CALCIUM CHLORIDE 600; 310; 30; 20 MG/100ML; MG/100ML; MG/100ML; MG/100ML
INJECTION, SOLUTION INTRAVENOUS CONTINUOUS
Status: DISCONTINUED | OUTPATIENT
Start: 2025-07-12 | End: 2025-07-16 | Stop reason: HOSPADM

## 2025-07-12 RX ORDER — LEVOTHYROXINE SODIUM 175 UG/1
175 TABLET ORAL
Status: DISCONTINUED | OUTPATIENT
Start: 2025-07-13 | End: 2025-07-16 | Stop reason: HOSPADM

## 2025-07-12 RX ORDER — NALOXONE HYDROCHLORIDE 0.4 MG/ML
0.2 INJECTION, SOLUTION INTRAMUSCULAR; INTRAVENOUS; SUBCUTANEOUS
Status: DISCONTINUED | OUTPATIENT
Start: 2025-07-12 | End: 2025-07-16 | Stop reason: HOSPADM

## 2025-07-12 RX ORDER — OXYCODONE HYDROCHLORIDE 5 MG/1
5 TABLET ORAL EVERY 4 HOURS PRN
Refills: 0 | Status: DISCONTINUED | OUTPATIENT
Start: 2025-07-12 | End: 2025-07-16 | Stop reason: HOSPADM

## 2025-07-12 RX ORDER — LIDOCAINE 40 MG/G
CREAM TOPICAL
Status: DISCONTINUED | OUTPATIENT
Start: 2025-07-12 | End: 2025-07-16 | Stop reason: HOSPADM

## 2025-07-12 RX ORDER — ACETAMINOPHEN 325 MG/1
975 TABLET ORAL 3 TIMES DAILY
Status: DISCONTINUED | OUTPATIENT
Start: 2025-07-12 | End: 2025-07-16 | Stop reason: HOSPADM

## 2025-07-12 RX ORDER — NALOXONE HYDROCHLORIDE 0.4 MG/ML
0.4 INJECTION, SOLUTION INTRAMUSCULAR; INTRAVENOUS; SUBCUTANEOUS
Status: DISCONTINUED | OUTPATIENT
Start: 2025-07-12 | End: 2025-07-16 | Stop reason: HOSPADM

## 2025-07-12 RX ORDER — OXYCODONE HYDROCHLORIDE 10 MG/1
10 TABLET ORAL EVERY 4 HOURS PRN
Refills: 0 | Status: DISCONTINUED | OUTPATIENT
Start: 2025-07-12 | End: 2025-07-15

## 2025-07-12 RX ORDER — OXYCODONE HYDROCHLORIDE 5 MG/1
5-10 TABLET ORAL EVERY 4 HOURS PRN
Status: DISCONTINUED | OUTPATIENT
Start: 2025-07-12 | End: 2025-07-12

## 2025-07-12 RX ADMIN — ROPINIROLE HYDROCHLORIDE 0.5 MG: 0.5 TABLET, FILM COATED ORAL at 22:34

## 2025-07-12 RX ADMIN — ACETAMINOPHEN 975 MG: 325 TABLET ORAL at 20:40

## 2025-07-12 RX ADMIN — SODIUM CHLORIDE, SODIUM LACTATE, POTASSIUM CHLORIDE, AND CALCIUM CHLORIDE: .6; .31; .03; .02 INJECTION, SOLUTION INTRAVENOUS at 18:23

## 2025-07-12 RX ADMIN — OXYCODONE HYDROCHLORIDE 5 MG: 5 TABLET ORAL at 18:13

## 2025-07-12 ASSESSMENT — ACTIVITIES OF DAILY LIVING (ADL)
ADLS_ACUITY_SCORE: 49
ADLS_ACUITY_SCORE: 49
ADLS_ACUITY_SCORE: 41
ADLS_ACUITY_SCORE: 49
ADLS_ACUITY_SCORE: 41
ADLS_ACUITY_SCORE: 41
ADLS_ACUITY_SCORE: 49
ADLS_ACUITY_SCORE: 41
ADLS_ACUITY_SCORE: 49
ADLS_ACUITY_SCORE: 41

## 2025-07-12 ASSESSMENT — COLUMBIA-SUICIDE SEVERITY RATING SCALE - C-SSRS
2. HAVE YOU ACTUALLY HAD ANY THOUGHTS OF KILLING YOURSELF IN THE PAST MONTH?: NO
6. HAVE YOU EVER DONE ANYTHING, STARTED TO DO ANYTHING, OR PREPARED TO DO ANYTHING TO END YOUR LIFE?: NO
1. IN THE PAST MONTH, HAVE YOU WISHED YOU WERE DEAD OR WISHED YOU COULD GO TO SLEEP AND NOT WAKE UP?: NO

## 2025-07-12 NOTE — ED TRIAGE NOTES
BIBA from [facility]. No BM in 6 days, tried laxative and enema. Had CT that found a sigmoid volvulus. Hx of drug abuse. Dilaudid in ED pain 5/10. VSS.         Triage Assessment (Adult)       Row Name 07/12/25 0936          Triage Assessment    Airway WDL WDL        Respiratory WDL    Respiratory WDL WDL        Skin Circulation/Temperature WDL    Skin Circulation/Temperature WDL WDL        Cardiac WDL    Cardiac WDL X  hypertensive        Peripheral/Neurovascular WDL    Peripheral Neurovascular WDL X  cold hands        Cognitive/Neuro/Behavioral WDL    Cognitive/Neuro/Behavioral WDL X;arousability;level of consciousness     Level of Consciousness sedated     Arousal Level arouses to voice     Orientation oriented x 4        Pupils (CN II)    Pupil PERRLA yes     Pupil Size Left 4 mm     Pupil Size Right 4 mm        [place] Coma Scale    Best Eye Response 3-->(E3) to speech     Best Motor Response 6-->(M6) obeys commands     Best Verbal Response 5-->(V5) oriented     [place] Coma Scale Score 14

## 2025-07-12 NOTE — ED NOTES
Awaiting to hear back from floor nurse for admit.  Pt has been sleeping since endoscopy but opens eyes to voice

## 2025-07-12 NOTE — ED NOTES
Bed: ED23  Expected date:   Expected time:   Means of arrival:   Comments:  Pt coming back, in endo

## 2025-07-12 NOTE — PROGRESS NOTES
Gastroenterology Endoscopy Suite Brief Operative Note    Procedure:  Flexible sigmoidoscopy   Post-operative diagnosis:  Sigmoid volvulus   Staff Physician:  Dr. Beltran   Fellow/Assistant(s):  Tomas Jama MD   Specimens:  NA   Findings:  Transition point in the distal sigmoid. Endoscopy was successful for detorsion and decompression.    Complications:  None.   Condition:  Stable   Recommendations  Diet:  NPO. Transition to CLD in 6h, unless CRS has further recommendations.   Planning:   KUB tomorrow AM      Gastroenterology will peripherally follow.     Tomas Lopez MD  Gastroenterology Fellow   Division of Gastroenterology, Hepatology and Nutrition  [facility]  Pager: [phone]

## 2025-07-12 NOTE — ED PROVIDER NOTES
[place] EMERGENCY DEPARTMENT ([facility])    7/12/25       ED PROVIDER NOTE   History     Chief Complaint   Patient presents with    Constipation     HPI  Jackie Rodriguez is a 60 year old female with a history of Naima-en-Y gastric bypass, s/p bariatric surgery (3/6/2012), opoid abuse and small bowel obstruction who presents to the ED for evaluation of constipation. Patient was transported via EMS from [facility]. Patient reports she has had constipation for the last 7 days with associated abdominal pain. Of note, [place] ED did CT of patient which had evidence of a sigmoid volvulus. Patient received Dilaudid here in ED. Patient notes she has tried to relieve her constipation prior to ED visits with laxatives and an enema. Patient denies vomiting, fevers, chills, and nausea.       CT ABDOMEN PELVIS W IV CONTRAST  07/12/25 01:30 AM  [facility] EMERGENCY DEPARTMENT  IMPRESSION:   Redundant sigmoid colon, which courses into the right upper hemiabdomen. The sigmoid colon in the upper abdomen is moderately distended with gas and there is anatomic distortion and decompression of the sigmoid colon leading to and from the pelvis. These findings are suggestive of a sigmoid volvulus. Additionally, there appears to be associated anatomic distortion of a separate segment of colon in the mid abdomen, possibly a portion of a redundant transverse colon. A very small amount of perihepatic free fluid.         Past Medical History  Past Medical History:   Diagnosis Date    History of drug abuse (H) 06/01/2011    Hypothyroidism     Left ovarian cyst 10/16/2013    Low ferritin 01/26/2021    Overweight BMI 37 01/16/2012    S/P hysterectomy     partial    T wave inversion in EKG 02/28/2012    Upper back strain 07/11/2011     Past Surgical History:   Procedure Laterality Date    BARIATRIC SURGERY  3/6/2012    naima-en-y    carple tunnel      bilat     COLONOSCOPY N/A 12/7/2016    Procedure:  COLONOSCOPY;  Surgeon: Frankie Gregorio MD;  Location: [place] [facility]    HYSTERECTOMY  1986 & 2014    HYSTERECTOMY, PAP NO LONGER INDICATED  1986    has left ovary    INJECT EPIDURAL CERVICAL  10/8/2012    Procedure: INJECT EPIDURAL CERVICAL;  ROMINA Cervical--;  Surgeon: Provider, Generic Anesthesia;  Location: [facility] OR    LAPAROSCOPIC SALPINGO-OOPHORECTOMY  10/21/2013    Procedure: LAPAROSCOPIC SALPINGO-OOPHORECTOMY;  Laparoscopic Left Salpingo-Oophorectomy;  Surgeon: Willie Leblanc MD;  Location: [facility] OR    REVISION BHUPENDRA-EN-Y  2012    Dr. Silva    TRANSPOSITION ULNAR NERVE (ELBOW)      right  x 2  left elbow x1     No current outpatient medications on file.    Allergies   Allergen Reactions    Nkda [No Known Drug Allergy]     Seasonal Allergies      Family History  Family History   Problem Relation Age of Onset    Arthritis Mother     Obesity Mother     Thyroid Disease Mother     Neurologic Disorder Mother         migraine    Hammer toes Mother     Arthritis Father     Cancer Father         lung ca    Thyroid Disease Brother     Neurologic Disorder Brother         migraine    Thyroid Disease Sister     Thyroid Disease Sister     Heart Disease Sister     Obesity Sister     Psychotic Disorder Sister     Neurologic Disorder Sister         migraines    Obesity Sister     Neurologic Disorder Daughter         migraines     Social History   Social History     Tobacco Use    Smoking status: Former     Current packs/day: 0.50     Types: Cigarettes    Smokeless tobacco: Never   Vaping Use    Vaping status: Never Used   Substance Use Topics    Alcohol use: No    Drug use: No     Comment: 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 No additional pertinent items.   A medically appropriate review of systems was performed with pertinent positives and negatives noted in  the HPI, and all other systems negative.    Physical Exam   BP: 124/73  Pulse: 57  Temp: 97.7  F (36.5  C)  Resp: 16  SpO2: 97 %  Physical Exam  Vitals and nursing note reviewed.   Constitutional:       General: She is not in acute distress.     Appearance: She is ill-appearing. She is not toxic-appearing or diaphoretic.   HENT:      Head: Normocephalic and atraumatic.   Eyes:      Extraocular Movements: Extraocular movements intact.      Conjunctiva/sclera: Conjunctivae normal.   Cardiovascular:      Rate and Rhythm: Bradycardia present.      Heart sounds: Normal heart sounds.   Pulmonary:      Effort: Pulmonary effort is normal. No respiratory distress.      Breath sounds: Normal breath sounds.   Abdominal:      General: There is distension.      Palpations: Abdomen is soft.      Tenderness: There is abdominal tenderness (diffuse).   Musculoskeletal:         General: No tenderness. Normal range of motion.      Cervical back: Normal range of motion and neck supple.   Skin:     General: Skin is warm.      Findings: No rash.   Neurological:      General: No focal deficit present.      Mental Status: She is alert and oriented to person, place, and time.      Cranial Nerves: No cranial nerve deficit.   Psychiatric:         Mood and Affect: Mood normal.         Behavior: Behavior normal.           ED Course, Procedures, & Data      Procedures                Results for orders placed or performed during the hospital encounter of 07/12/25   FLEXIBLE SIGMOIDOSCOPY   Result Value Ref Range    Flex Sig       [facility]  [address] [place] [address] [phone]     Endoscopy Department  _______________________________________________________________________________  Patient Name: Jackie Rodriguez          Procedure Date: 7/12/2025 12:07 PM  MRN: [id number]                       Account Number: [id number]  YOB: 1965               Admit Type: Emergency Department  Age: 60                                Room: [place] [address]  Gender: Female                        Note Status: Finalized  Attending MD: JOSÉ LUIS AKINS , ,          Total Sedation Time:   _______________________________________________________________________________     Procedure:             Flexible Sigmoidoscopy  Indications:           Sigmoid Volvulus Reduction  Providers:             JOSÉ LUIS AKINS, Christian Adams, RN, Julia Hopson RN  Referring MD:            Complications:         No immediate complications. Estimated blood loss: None.  __________________________________________________ _____________________________  Procedure:             Pre-Anesthesia Assessment:                         - Prior to the procedure, a History and Physical was                          performed, and patient medications and allergies were                          reviewed. The patient is competent. The risks and                          benefits of the procedure and the sedation options and                          risks were discussed with the patient. All questions                          were answered and informed consent was obtained.                          Patient identification and proposed procedure were                          verified by the physician in the endoscopy suite.                          Mental Status Examination: alert and oriented. Airway                          Examination: normal oropharyngeal airway and neck                          mobility. After reviewing the risks and benefits, the                          patient was deemed in satisfactory condition to                           undergo the procedure. The anesthesia plan was to use                          moderate sedation / analgesia (conscious sedation).                          Immediately prior to administration of medications,                          the patient was re-assessed for adequacy to receive                          sedatives. The heart rate,  respiratory rate, oxygen                          saturations, blood pressure, adequacy of pulmonary                          ventilation, and response to care were monitored                          throughout the procedure. The physical status of the                          patient was re-assessed after the procedure.                         After obtaining informed consent, the scope was passed                          under direct vision. The Colonoscope was introduced                          through the anus and advanced to the left transverse                          colon. The flexible sigmoidoscopy was accomplished                           without difficulty. The patient tolerated the                          procedure well. The quality of the bowel preparation                          was poor.                                                                                   Findings:       Peds colonoscope was inserted with water Sam technique and advanced        to the distal sigmoid colon, where a point of abruptly        twisted/converging colon mucosa identified and Â Whirl signÂ  visualized.        Successful detorsion was achieved with gentle and direct advancement of        the scope tip through the twisted segment. The sigmoid colon mucosa        appeared to be mildly edematous with mild ischemic changes present. The        sigmoid colon was significantly redundant. The scope was advanced to the        mid transverse colon. I was not able to advance any further due to the        stool burden. Aggressive decompression was performed and large amounts        of gas expelled.                                                                                    Impression:            As above  Recommendation:        Â  Admit the patient to hospital oro for observation.                         Â  NPO initially                         Â  IV fluid resuscitation                         Â  Vital sign  monitoring                         Â  Nasogastric decompression                         Â  Serial abdominal examinations                         Â  Broad-spectrum antibiotics                         Â  Given high rate of recurrence specifically in this                          patient with redundant sigmoid colon, recommend                          surgical consult for consideration of elective sigmoid                          resection preferrably during this admission. High                          recurrence rate (43-75%) expected without resection.                         Patient needs a colonoscopy for CRC screening as an                          outpatient.                                                                                        _____________  JOSÉ LUIS MABLE,   7/12/2025 2:02:26 PM  I was physically present for the entire viewing portion of the exam.  __________________________  Signature of teaching physician  Tom/[id number] MABLE  Number of Addenda: 0    Note Initiated On: 7/12/2025 12:07 PM  Scope In:  Scope Out:     XR Abdomen Port 1 View    Impression    IMPRESSION: Gaseous dilation of multiple loops of bowel. No definite  evidence of free air, though this is difficult to assess given supine  projection. Consider repeat upright radiograph for better sensitivity.    I have personally reviewed the examination and initial interpretation  and I agree with the findings.    COY JAMESON MD         SYSTEM ID:  [id number]   Comprehensive metabolic panel   Result Value Ref Range    Sodium 140 135 - 145 mmol/L    Potassium 4.3 3.4 - 5.3 mmol/L    Carbon Dioxide (CO2) 24 22 - 29 mmol/L    Anion Gap 12 7 - 15 mmol/L    Urea Nitrogen 15.7 8.0 - 23.0 mg/dL    Creatinine 1.54 (H) 0.51 - 0.95 mg/dL    GFR Estimate 38 (L) >60 mL/min/1.73m2    Calcium 9.2 8.8 - 10.4 mg/dL    Chloride 104 98 - 107 mmol/L    Glucose 75 70 - 99 mg/dL    Alkaline Phosphatase 139 40 - 150 U/L    AST 94 (H) 0 - 45 U/L     (H) 0  - 50 U/L    Protein Total 7.2 6.4 - 8.3 g/dL    Albumin 4.4 3.5 - 5.2 g/dL    Bilirubin Total 0.3 <=1.2 mg/dL   INR   Result Value Ref Range    INR 0.97 0.85 - 1.15    PT 13.2 11.8 - 14.8 Seconds   Lactic acid whole blood with 1x repeat in 2 hr when >2   Result Value Ref Range    Lactic Acid, Initial 0.9 0.7 - 2.0 mmol/L   CBC with platelets and differential   Result Value Ref Range    WBC Count 5.2 4.0 - 11.0 10e3/uL    RBC Count 4.57 3.80 - 5.20 10e6/uL    Hemoglobin 10.7 (L) 11.7 - 15.7 g/dL    Hematocrit 36.6 35.0 - 47.0 %    MCV 80 78 - 100 fL    MCH 23.4 (L) 26.5 - 33.0 pg    MCHC 29.2 (L) 31.5 - 36.5 g/dL    RDW 21.7 (H) 10.0 - 15.0 %    Platelet Count 331 150 - 450 10e3/uL    % Neutrophils 60 %    % Lymphocytes 32 %    % Monocytes 4 %    % Eosinophils 3 %    % Basophils 1 %    % Immature Granulocytes 0 %    NRBCs per 100 WBC 0 <1 /100    Absolute Neutrophils 3.1 1.6 - 8.3 10e3/uL    Absolute Lymphocytes 1.7 0.8 - 5.3 10e3/uL    Absolute Monocytes 0.2 0.0 - 1.3 10e3/uL    Absolute Eosinophils 0.1 0.0 - 0.7 10e3/uL    Absolute Basophils 0.1 0.0 - 0.2 10e3/uL    Absolute Immature Granulocytes 0.0 <=0.4 10e3/uL    Absolute NRBCs 0.0 10e3/uL   Adult Type and Screen   Result Value Ref Range    ABO/RH(D) B POS     Antibody Screen Negative Negative    SPECIMEN EXPIRATION DATE 7/15/2025 11:59:00 PM CDT      Medications   lidocaine 1 % 0.1-1 mL (has no administration in time range)   lidocaine (LMX4) cream (has no administration in time range)   sodium chloride (PF) 0.9% PF flush 3 mL (has no administration in time range)   sodium chloride (PF) 0.9% PF flush 3 mL (has no administration in time range)   acetaminophen (TYLENOL) tablet 650 mg (has no administration in time range)   oxyCODONE (ROXICODONE) tablet 5-10 mg (has no administration in time range)   lactated ringers infusion (has no administration in time range)   naloxone (NARCAN) injection 0.2 mg (has no administration in time range)     Or   naloxone (NARCAN)  injection 0.4 mg (has no administration in time range)     Or   naloxone (NARCAN) injection 0.2 mg (has no administration in time range)     Or   naloxone (NARCAN) injection 0.4 mg (has no administration in time range)          Critical Care Addendum  My initial assessment, based on my review of prehospital provider report, review of nursing observations, review of vital signs, focused history, physical exam, and interpretation of CT abdomen/pelvis from an outside hospital , established a high suspicion that Jackie Rodriguez has sigmoid volvulus, which requires immediate intervention, and therefore she is critically ill.     After the initial assessment, the care team consulted with colorectal and GI teams to provide stabilization care. Due to the critical nature of this patient, I reassessed nursing observations, vital signs, physical exam, review of cardiac rhythm monitor, mental status, and respiratory status multiple times prior to her disposition.     Time also spent performing documentation, reviewing test results, discussion with consultants, and coordination of care.     Critical care time (excluding teaching time and procedures): 45 minutes.       Assessment & Plan    Jackie Rodriguez is a 60-year-old woman who was transferred from [place] emergency department due to sigmoid volvulus.  Patient is awake and alert in mild discomfort due to pain.  She states that pain is tolerable and she declined analgesia at this time.  I will obtain laboratory studies.  She is hemodynamically stable.  Colorectal surgery was consulted.  They will see the patient.  They also requested to consult GI team as well.  I will do this.      Laboratory studies returned with no leukocytosis, WBC is normal at 5200.  There is anemia with hemoglobin of 10.7.  Electrolytes show slight dehydration with elevated creatinine of 1.54.  Lactic acid is normal at 0.9.  Patient went to the endoscopy suite with GI team for procedural  intervention.  She will be admitted to colorectal surgery afterwards.    I have reviewed the nursing notes. I have reviewed the findings, diagnosis, plan and need for follow up with the patient.    Current Discharge Medication List          Final diagnoses:   Sigmoid volvulus (H)   Stone GLASGOW, am serving as a trained medical scribe to document services personally performed by Zoë Mason MD, MD, based on the provider's statements to me.     Isabella GLASGOW Nikola, MD, MD, was physically present and have reviewed and verified the accuracy of this note documented by Stone Middleton.     Zoë Mason MD  [facility] EMERGENCY DEPARTMENT  7/12/2025     Zoë Mason MD  07/12/25 1931

## 2025-07-12 NOTE — PROGRESS NOTES
BRIEF PROGRESS NOTE 7/12/25    1700:  Patient examined after flexible sigmoidoscopy and successful detorsion and decompression of sigmoid volvulus with GI.     Patient states that her abdominal pain has significantly improved. Notes continued discomfort on her ride side. Denies chest pain, SOB, nausea, vomiting. Has not urinated thus far today, has remained NPO.    Nurse bladder scanned patient- 820 measured, then voided 425. Will continue to monitor urine output while patient receiving maintenance IVF at 100 mL/hr. Ice chips only per mouth.    Physical Exam:  Gen: AOx3, NAD, resting comfortably in bed  Resp: breathing non-labored, on room air  Abdomen: soft, non-distended, mild tenderness in RLQ  Extremities: warm and well-perfused  Skin: warm, dry    Patient admitted to colorectal surgery service, team aware.    1900:  Patient examined at bedside for bradycardia down into 40s. MAPs maintained. Denies chest pain, discomfort, tightness. Denies palpitations, SOB, dizziness.    Physical Exam:  Gen: AOx3, resting comfortably in bed  Cardio: Regular rhythm. Rate 45.  Resp: Sating well on room air  Abdomen: Soft, non-distended  Extremities: Warm and well-perfused. Bilateral radial pulses palpable.  Skin: Warm, dry, pink    Will put on telemetry and obtain 12 lead EKG.

## 2025-07-12 NOTE — PLAN OF CARE
Goal Outcome Evaluation:  Significant 24 hour events: voided. Started on LR IVMF. Admitted to 5A. Admission questions not completed. Skin check still needed.  Javier - tele started, EKG ordered.     Level of Care: Acute    Summary Type: 5A Post Op Report   POD: #0 = 7/12 Flexible sigmoidoscopy   Complications:    Pain:  controlled w/ oxycodone.  Neuro:.WDL except, arousability, level of consciousness drowsy.   CV:  bradycardic   Resp:  RA  GI:  abdominal pain. Distention. No BM. No gas. Potential surgery.   : voiding spontaneously.   Skin:   PIV x2  Activity Assistance:   Assist x 1   Safety/Falls Risk:   Assist x 1  Consults: colorectal   Procedures/Imaging: Abdominal Xray. Endoscopy . EKG.   Precautions: Fall Risk Precautions    If no gas and BM potential surgery tomorrow. If abdominal pain becomes worse and no pain/no gas then potential surgery tonight.     Plan of Care Reviewed With: patient    Overall Patient Progress: improvingOverall Patient Progress: improving

## 2025-07-12 NOTE — OR NURSING
Flex sigmoidoscopy completed with moderate sedation and on 2L nc oxygen and pt tolerated well. Report called to ED RN and transport to take pt back to dept.  Pt sleepy, but responds when spoken to.

## 2025-07-12 NOTE — ED NOTES
Bed: ED23  Expected date: 7/12/25  Expected time: 10:06 AM  Means of arrival: Ambulance  Comments:  Jackie Rodriguez transfer

## 2025-07-12 NOTE — PROVIDER NOTIFICATION
Eden Monterroso paged [phone]  Pt intermittently bradycardic , HR dropped into high 40's when pt was sleeping. No lightheaded /dizziness reported. Also, please change oxycodone PRN order, we are not allowed to do range orders for narcotics. Pt bladder scanned for 820 and urinated 425.

## 2025-07-12 NOTE — CONSULTS
[facility]    Consult  - MIS/Bariatric Surgery Service       Date of Admission:  7/12/2025    Assessment & Plan: Surgery   Jackie Rodriguez is a 60 year old female with history of Lola-en-Y gastric bypass (3/6/2012), substance use disorder, and history of small bowel obstruction who was transferred to the [facility] ED from [facility] for concern for sigmoid volvulus. ED work up was significant for normal heart rate, normotensive, creatinine 1.54, , AST 94, lactate within normal limits, and WBC 5.2. MIS was consulted for further evaluation of internal hernia. Low suspicion for internal hernia of the small bowel based on CT A/P.     - No acute surgical intervention  - Could consider NG tube for decompression of small bowel  - Can be available if colorectal surgery is planning operative intervention for assistance given prior lola-en-y.      The patient's care was discussed with the attending.    Maria Del Rosario Schafer DO  [facility]  All communications related to this note should be expressed to resident/AIME on call for this team at the time of your communication. Please be advised that not all members of this team utilize vocera.  ______________________________________________________________________    Chief Complaint   Concern for internal hernia    History of Present Illness   Jackie Rodriguez is a 60 year old female with history of Olla-en-Y gastric bypass (3/6/2012), substance use disorder, and history of small bowel obstruction who was transferred to the [facility] from [facility] for concern for sigmoid volvulus. ED work up was significant for normal heart rate, normotensive, creatinine 1.54, , AST 94, lactate within normal limits, and WBC 5.2. MIS was consulted for further evaluation of internal hernia.      Patient seen at bedside. Reports she has not had a bowel movement for 6  days with associated abdominal distension. She last passed gas yesterday. Typically has a bowel movement every 2-3 days. Prior to presenting to the ED she tried using laxatives and an enema to relive her constipation. Denies any nausea or vomiting. She has been voiding. Denies ever having a colonoscopy.     Lola-en-y gastric bypass was done in 2012 by Dr. Silva. Per operative report at least one mesenteric defect was closed in the procedure.       Past Medical History    Past Medical History:   Diagnosis Date    History of drug abuse (H) 06/01/2011    Hypothyroidism     Left ovarian cyst 10/16/2013    Low ferritin 01/26/2021    Overweight BMI 37 01/16/2012    S/P hysterectomy     partial    T wave inversion in EKG 02/28/2012    Upper back strain 07/11/2011       Past Surgical History   Past Surgical History:   Procedure Laterality Date    BARIATRIC SURGERY  3/6/2012    lola-en-y    carple tunnel      bilat     COLONOSCOPY N/A 12/7/2016    Procedure: COLONOSCOPY;  Surgeon: Frankie Gregorio MD;  Location: [facility]    HYSTERECTOMY  1986 & 2014    HYSTERECTOMY, PAP NO LONGER INDICATED  1986    has left ovary    INJECT EPIDURAL CERVICAL  10/8/2012    Procedure: INJECT EPIDURAL CERVICAL;  ROMINA Cervical--;  Surgeon: Provider, Generic Anesthesia;  Location: [facility]    LAPAROSCOPIC SALPINGO-OOPHORECTOMY  10/21/2013    Procedure: LAPAROSCOPIC SALPINGO-OOPHORECTOMY;  Laparoscopic Left Salpingo-Oophorectomy;  Surgeon: Willie Leblanc MD;  Location: [facility]    REVISION LOLA-EN-Y  2012    Dr. Silva    TRANSPOSITION ULNAR NERVE (ELBOW)      right  x 2  left elbow x1       Prior to Admission Medications   Prior to Admission Medications   Prescriptions Last Dose Informant Patient Reported? Taking?   ferrous fumarate-vitamin C ER (KEVIN-SEQUELS) 65-25 MG CR tablet   No No   Sig: Take 1 tablet by mouth daily.   Patient not taking: Reported on 2/25/2025   levothyroxine (SYNTHROID/LEVOTHROID) 175 MCG tablet   No No   Sig:  TAKE 1 TABLET(175 MCG) BY MOUTH EVERY MORNING BEFORE BREAKFAST   rOPINIRole (REQUIP) 0.25 MG tablet   No No   Sig: Take 2 tablets (0.5 mg) by mouth at bedtime.   vitamin D2 (ERGOCALCIFEROL) 89221 units (1250 mcg) capsule   No No   Sig: Take 1 capsule (50,000 Units) by mouth once a week      Facility-Administered Medications: None        Review of Systems    The 10 point Review of Systems is negative other than noted in the HPI or here.     Social History   I have reviewed this patient's social history and updated it with pertinent information if needed.  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  Family History   Problem Relation Age of Onset    Arthritis Mother     Obesity Mother     Thyroid Disease Mother     Neurologic Disorder Mother         migraine    Hammer toes Mother     Arthritis Father     Cancer Father         lung ca    Thyroid Disease Brother     Neurologic Disorder Brother         migraine    Thyroid Disease Sister     Thyroid Disease Sister     Heart Disease Sister     Obesity Sister     Psychotic Disorder Sister     Neurologic Disorder Sister         migraines    Obesity Sister     Neurologic Disorder Daughter         migraines         Allergies   Allergies   Allergen Reactions    Nkda [No Known Drug Allergy]     Seasonal Allergies         Physical Exam   Vital Signs: Temp: 97.7  F (36.5  C) Temp src: Oral BP: 132/86 Pulse: 53   Resp: 16 SpO2: 94 % O2 Device: None (Room air)    Weight: 0 lbs 0 ozNo intake or output data in the 24 hours ending 07/12/25 1342     Gen: Alert and conversational adult female in NAD  Eyes:  Nonicteric  ENT: Mucous Membranes Moist  Pulm: Nonlabored Breathing on RA  CV: Normal heart rate, normotensive   Abd: Soft, moderately distended, tenderness to palpation RUQ and LUQ, no guarding, not peritonitic   : No daigle present  Skin: WWP  Extremities: No peripheral edema  Neuro: CN grossly intact, no focal deficits  Psych: Appropriate in conversation         Data     I have personally reviewed the following data over the past 24 hrs:    5.2  \   10.7 (L)   / 331     140 104 15.7 /  75   4.3 24 1.54 (H) \     ALT: 120 (H) AST: 94 (H) AP: 139 TBILI: 0.3   ALB: 4.4 TOT PROTEIN: 7.2 LIPASE: N/A     Procal: N/A CRP: N/A Lactic Acid: 0.9       INR:  0.97 PTT:  N/A   D-dimer:  N/A Fibrinogen:  N/A       Imaging results reviewed over the past 24 hrs:   Recent Results (from the past 24 hours)   XR Abdomen Port 1 View    Narrative    EXAMINATION:  XR ABDOMEN PORT 1 VIEW 7/12/2025     COMPARISON: Abdominal radiograph 8/16/2019.    HISTORY: Sigmoid volv assess for perf.    TECHNIQUE: Frontal supine view of the abdomen.    FINDINGS: Gaseous dilation of multiple bowel loops without definite  evidence of free air, pneumatosis, or portal venous gas. Moderate to  large colonic stool burden throughout the right hemiabdomen. Contrast  material within the bladder.      Impression    IMPRESSION: Gaseous dilation of multiple loops of bowel. No definite  evidence of free air, though this is difficult to assess given supine  projection. Consider repeat upright radiograph for better sensitivity.    I have personally reviewed the examination and initial interpretation  and I agree with the findings.    COY JAMESON MD         SYSTEM ID:  [id number]

## 2025-07-12 NOTE — CONSULTS
"    GASTROENTEROLOGY CONSULTATION      Date of Admission:  7/12/2025  Requesting physician: No att. providers found           Reason for Consultation:   Concern for sigmoid volvulus           ASSESSMENT AND RECOMMENDATIONS:   Assessment:  60-year-old female with a history of Naima-en-Y gastric bypass (3/6/2012), opioid use disorder, and prior small bowel obstruction, presented to [facility] ED with progressive abdominal pain and constipation of 7 days  duration. She reported crampy, intermittent abdominal pain described as a tightening \"belt-like\" sensation, associated with marked distension. CT abdomen/pelvis with IV contrast obtained at [place] ED showed a markedly redundant sigmoid colon looping into the right upper quadrant with associated anatomic distortion, gas distension, and decompression of the proximal and distal sigmoid--findings concerning for sigmoid volvulus    On arrival, she was hemodynamically stable (normal HR and BP), afebrile, and mildly uncomfortable. Labs revealed creatinine 1.54, AST 94, , lactate within normal limits, and WBC 5.2. She received IV Dilaudid in the ED. She recalls a prior colonoscopy over 10 years ago, which was limited by poor prep and was reportedly normal aside from incomplete visualization; no follow-up has been done since. She expressed openness to flexible sigmoidoscopy for further evaluation and decompression if needed.    I was contacted by Dr. Zoë Mason, the emergency room attending, regarding this patient. Per their report, colorectal surgery had accepted the patient for transfer. Upon arrival, we evaluated the patient and shared with Dr. Mason that we planned to proceed with flexible sigmoidoscopy. I personally reviewed the CT images with Dr. Mason in the ED, and the patient was transferred to the endoscopy unit for the procedure. However, just prior to initiating the intervention, the colorectal surgery team arrived and requested that we " "hold off due to concern for possible internal hernia. We subsequently reviewed the imaging with radiology; the radiology resident was awaiting an attending for formal overread.     We clearly communicated our plan and availability to all involved services--emergency medicine, colorectal surgery, and radiology. Should internal hernia be excluded, we remain available to proceed with flexible sigmoidoscopy.     #.Concern for sigmoid volvulus    Recommendations  - Pending involved teams consent to proceed with flexible sigmoidoscopy.       Thank you for involving us in this patient's care. Please do not hesitate to contact the GI service with any questions or concerns.     Pt care plan discussed with Dr. Beltran, GI staff physician.      Tomas Lopez MD  Gastroenterology Fellow   Division of Gastroenterology, Hepatology and Nutrition  [facility]  Pager: [phone]   -------------------------------------------------------------------------------------------------------------------           History of Present Illness:   60-year-old female with a history of Naima-en-Y gastric bypass (3/6/2012), opioid use disorder, and prior small bowel obstruction, presented to [facility] ED with progressive abdominal pain and constipation of 7 days  duration. She reported crampy, intermittent abdominal pain described as a tightening \"belt-like\" sensation, associated with marked distension. She denied nausea, vomiting, diarrhea, fevers, or chills. Attempts at home bowel relief included laxatives and enemas, without success. She is baseline constipated, with bowel movements every 2-3 days and no routine bowel regimen.    CT abdomen/pelvis with IV contrast obtained at [place] ED showed a markedly redundant sigmoid colon looping into the right upper quadrant with associated anatomic distortion, gas distension, and decompression of the proximal and distal sigmoid--findings concerning for sigmoid volvulus. " There was also distortion of an adjacent colonic segment, possibly redundant transverse colon, and trace perihepatic free fluid. Colorectal surgery was consulted, and the patient was transferred to the [place] ED after [facility] accepted the case.            Past Medical History:   Reviewed and edited as appropriate  Past Medical History:   Diagnosis Date    History of drug abuse (H) 06/01/2011    Hypothyroidism     Left ovarian cyst 10/16/2013    Low ferritin 01/26/2021    Overweight BMI 37 01/16/2012    S/P hysterectomy     partial    T wave inversion in EKG 02/28/2012    Upper back strain 07/11/2011            Past Surgical History:   Reviewed and edited as appropriate   Past Surgical History:   Procedure Laterality Date    BARIATRIC SURGERY  3/6/2012    lloa-en-y    carple tunnel      bilat     COLONOSCOPY N/A 12/7/2016    Procedure: COLONOSCOPY;  Surgeon: Frankie Gregorio MD;  Location: [facility]    HYSTERECTOMY  1986 & 2014    HYSTERECTOMY, PAP NO LONGER INDICATED  1986    has left ovary    INJECT EPIDURAL CERVICAL  10/8/2012    Procedure: INJECT EPIDURAL CERVICAL;  ROMINA Cervical--;  Surgeon: Provider, Generic Anesthesia;  Location: [facility]    LAPAROSCOPIC SALPINGO-OOPHORECTOMY  10/21/2013    Procedure: LAPAROSCOPIC SALPINGO-OOPHORECTOMY;  Laparoscopic Left Salpingo-Oophorectomy;  Surgeon: Willie Leblanc MD;  Location: [facility]    REVISION LOLA-EN-Y  2012    Dr. Silva    TRANSPOSITION ULNAR NERVE (ELBOW)      right  x 2  left elbow x1            Social History:   Reviewed and edited as appropriate  Social History     Socioeconomic History    Marital status:      Spouse name: Not on file    Number of children: 2    Years of education: Not on file    Highest education level: Not on file   Occupational History     Employer: UNEMPLOYED   Tobacco Use    Smoking status: Former     Current packs/day: 0.50     Types: Cigarettes    Smokeless tobacco: Never   Vaping Use    Vaping status: Never Used    Substance and Sexual Activity    Alcohol use: No    Drug use: No     Comment: 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 No   Social History Narrative    Not on file     Social Drivers of Health     Financial Resource Strain: Low Risk  (2/25/2025)    Financial Resource Strain     Within the past 12 months, have you or your family members you live with been unable to get utilities (heat, electricity) when it was really needed?: No   Food Insecurity: Low Risk  (2/25/2025)    Food Insecurity     Within the past 12 months, did you worry that your food would run out before you got money to buy more?: No     Within the past 12 months, did the food you bought just not last and you didn t have money to get more?: No   Transportation Needs: Low Risk  (2/25/2025)    Transportation Needs     Within the past 12 months, has lack of transportation kept you from medical appointments, getting your medicines, non-medical meetings or appointments, work, or from getting things that you need?: No   Physical Activity: Insufficiently Active (2/25/2025)    Exercise Vital Sign     Days of Exercise per Week: 5 days     Minutes of Exercise per Session: 10 min   Stress: Stress Concern Present (2/25/2025)    [employer] - Occupational Stress Questionnaire     Feeling of Stress : Very much   Social Connections: Unknown (2/25/2025)    Social Connection and Isolation Panel [NHANES]     Frequency of Communication with Friends and Family: Not on file     Frequency of Social Gatherings with Friends and Family: More than three times a week     Attends Rastafari Services: Not on file     Active Member of Clubs or Organizations: Not on file     Attends Club or Organization Meetings: Not on file     Marital Status: Not on  file   Interpersonal Safety: Low Risk  (2/25/2025)    Interpersonal Safety     Do you feel physically and emotionally safe where you currently live?: Yes     Within the past 12 months, have you been hit, slapped, kicked or otherwise physically hurt by someone?: No     Within the past 12 months, have you been humiliated or emotionally abused in other ways by your partner or ex-partner?: No   Housing Stability: High Risk (2/25/2025)    Housing Stability     Do you have housing? : Yes     Are you worried about losing your housing?: Yes            Family History:   Reviewed and edited as appropriate  Family History   Problem Relation Age of Onset    Arthritis Mother     Obesity Mother     Thyroid Disease Mother     Neurologic Disorder Mother         migraine    Hammer toes Mother     Arthritis Father     Cancer Father         lung ca    Thyroid Disease Brother     Neurologic Disorder Brother         migraine    Thyroid Disease Sister     Thyroid Disease Sister     Heart Disease Sister     Obesity Sister     Psychotic Disorder Sister     Neurologic Disorder Sister         migraines    Obesity Sister     Neurologic Disorder Daughter         migraines     No known history of colorectal cancer, liver disease, or inflammatory bowel disease.         Allergies:   Reviewed and edited as appropriate     Allergies   Allergen Reactions    Nkda [No Known Drug Allergy]     Seasonal Allergies             Medications:     Medications Prior to Admission   Medication Sig Dispense Refill Last Dose/Taking    ferrous fumarate-vitamin C ER (KEVIN-SEQUELS) 65-25 MG CR tablet Take 1 tablet by mouth daily. (Patient not taking: Reported on 2/25/2025) 90 tablet 1     levothyroxine (SYNTHROID/LEVOTHROID) 175 MCG tablet TAKE 1 TABLET(175 MCG) BY MOUTH EVERY MORNING BEFORE BREAKFAST 90 tablet 0     rOPINIRole (REQUIP) 0.25 MG tablet Take 2 tablets (0.5 mg) by mouth at bedtime. 60 tablet 11     vitamin D2 (ERGOCALCIFEROL) 96491 units (1250 mcg)  capsule Take 1 capsule (50,000 Units) by mouth once a week 6 capsule 0              Review of Systems:     A complete 10 point review of systems was performed and is negative except as noted in the HPI           Physical Exam:   /86   Pulse 53   Temp 97.7  F (36.5  C) (Oral)   Resp 16   SpO2 94%   Wt:   Wt Readings from Last 2 Encounters:   02/25/25 62.1 kg (137 lb)   01/17/25 60.8 kg (134 lb 1.6 oz)      Constitutional: No acute distress, resting comfortably in bed  Eyes: Sclera anicteric  Ears/nose/mouth/throat: Moist mucus membranes, hearing intact  Neck: supple  CV: No edema  Respiratory: Breathing comfortably on room air  Abd: Soft, mildly diffuse tenderness, severe distention, bowel sounds present  Skin: warm, perfused, no jaundice  Neuro: AAO x 3  Psych: Normal affect  MSK: No gross deformities         Data:   Labs and imaging below were independently reviewed and interpreted    BMP  Recent Labs   Lab 07/12/25  1031      POTASSIUM 4.3   CHLORIDE 104   NORA 9.2   CO2 24   BUN 15.7   CR 1.54*   GLC 75     CBC  Recent Labs   Lab 07/12/25  1031   WBC 5.2   RBC 4.57   HGB 10.7*   HCT 36.6   MCV 80   MCH 23.4*   MCHC 29.2*   RDW 21.7*        INR  Recent Labs   Lab 07/12/25  1031   INR 0.97     LFTs  Recent Labs   Lab 07/12/25  1031   ALKPHOS 139   AST 94*   *   BILITOTAL 0.3   PROTTOTAL 7.2   ALBUMIN 4.4      MELD  MELD 3.0: 12 at 7/12/2025 10:31 AM  MELD-Na: 10 at 7/12/2025 10:31 AM  Calculated from:  Serum Creatinine: 1.54 mg/dL at 7/12/2025 10:31 AM  Serum Sodium: 140 mmol/L (Using max of 137 mmol/L) at 7/12/2025 10:31 AM  Total Bilirubin: 0.3 mg/dL (Using min of 1 mg/dL) at 7/12/2025 10:31 AM  Serum Albumin: 4.4 g/dL (Using max of 3.5 g/dL) at 7/12/2025 10:31 AM  INR(ratio): 0.97 (Using min of 1) at 7/12/2025 10:31 AM  Age at listing (hypothetical): 60 years  Sex: Female at 7/12/2025 10:31 AM    PANCNo lab results found in last 7 days.    Imaging: Pending over read     Endoscopy: NA

## 2025-07-12 NOTE — H&P
[facility]    History and Physical - Colorectal Surgery Service       Date of Admission:  7/12/2025    Assessment & Plan: Surgery   Jackie Rodriguez is a 60 year old female with history of Naima-en-Y gastric bypass (3/6/2012), substance use disorder, and history of small bowel obstruction who was transferred to the [place] ED from [facility] for concern for sigmoid volvulus. ED work up was significant for normal heart rate, normotensive, creatinine 1.54, , AST 94, lactate within normal limits, and WBC 5.2. Colorectal surgery was consulted for further evaluation of sigmoid volvulus. CT A/P reviewed with redundant sigmoid and transverse colon with concern for sigmoid volvulus. Patient is hemodynamically stable at this time.     - No emergent surgical intervention   - Plan for flexible sigmoidoscopy with GI for detorsion. Will plan for further evaluation after scope for further operative plans  - Awaiting overread by radiology of outside CT scan  - NPO okay for ice chips, IVF  - Admit to colorectal surgery    The patient's care was discussed with the colorectal fellow.    Maria Del Rosario Schafer DO  [facility]  All communications related to this note should be expressed to resident/AIME on call for this team at the time of your communication. Please be advised that not all members of this team utilize vocera.  ______________________________________________________________________    Chief Complaint   Concern for sigmoid volvulus    History of Present Illness   Jackie Rodriguez is a 60 year old female with history of Naima-en-Y gastric bypass (3/6/2012), substance use disorder, and history of small bowel obstruction who was transferred to the [place] ED from [facility] for concern for sigmoid volvulus. ED work up was significant for normal heart rate, normotensive, creatinine 1.54, , AST 94,  lactate within normal limits, and WBC 5.2. Colorectal surgery was consulted for further evaluation of sigmoid volvulus.     Patient seen at bedside. Reports she has not had a bowel movement for 6 days with associated abdominal distension. She last passed gas yesterday. Typically has a bowel movement every 2-3 days. Prior to presenting to the ED she tried using laxatives and an enema to relive her constipation. Denies any nausea or vomiting. She has been voiding. Denies ever having a colonoscopy.       Past Medical History    Past Medical History:   Diagnosis Date    History of drug abuse (H) 06/01/2011    Hypothyroidism     Left ovarian cyst 10/16/2013    Low ferritin 01/26/2021    Overweight BMI 37 01/16/2012    S/P hysterectomy     partial    T wave inversion in EKG 02/28/2012    Upper back strain 07/11/2011       Past Surgical History   Past Surgical History:   Procedure Laterality Date    BARIATRIC SURGERY  3/6/2012    lola-en-y    carple tunnel      bilat     COLONOSCOPY N/A 12/7/2016    Procedure: COLONOSCOPY;  Surgeon: Frankie Gregorio MD;  Location: [facility]    HYSTERECTOMY  1986 & 2014    HYSTERECTOMY, PAP NO LONGER INDICATED  1986    has left ovary    INJECT EPIDURAL CERVICAL  10/8/2012    Procedure: INJECT EPIDURAL CERVICAL;  ROMINA Cervical--;  Surgeon: Provider, Generic Anesthesia;  Location: [facility] OR    LAPAROSCOPIC SALPINGO-OOPHORECTOMY  10/21/2013    Procedure: LAPAROSCOPIC SALPINGO-OOPHORECTOMY;  Laparoscopic Left Salpingo-Oophorectomy;  Surgeon: Willie Leblanc MD;  Location: [facility] OR    REVISION LOLA-EN-Y  2012    Dr. Silva    TRANSPOSITION ULNAR NERVE (ELBOW)      right  x 2  left elbow x1       Prior to Admission Medications   Prior to Admission Medications   Prescriptions Last Dose Informant Patient Reported? Taking?   ferrous fumarate-vitamin C ER (KEVIN-SEQUELS) 65-25 MG CR tablet   No No   Sig: Take 1 tablet by mouth daily.   Patient not taking: Reported on 2/25/2025   levothyroxine  (SYNTHROID/LEVOTHROID) 175 MCG tablet   No No   Sig: TAKE 1 TABLET(175 MCG) BY MOUTH EVERY MORNING BEFORE BREAKFAST   rOPINIRole (REQUIP) 0.25 MG tablet   No No   Sig: Take 2 tablets (0.5 mg) by mouth at bedtime.   vitamin D2 (ERGOCALCIFEROL) 09671 units (1250 mcg) capsule   No No   Sig: Take 1 capsule (50,000 Units) by mouth once a week      Facility-Administered Medications: None        Review of Systems    The 10 point Review of Systems is negative other than noted in the HPI or here.     Social History   I have reviewed this patient's social history and updated it with pertinent information if needed.  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  Family History   Problem Relation Age of Onset    Arthritis Mother     Obesity Mother     Thyroid Disease Mother     Neurologic Disorder Mother         migraine    Hammer toes Mother     Arthritis Father     Cancer Father         lung ca    Thyroid Disease Brother     Neurologic Disorder Brother         migraine    Thyroid Disease Sister     Thyroid Disease Sister     Heart Disease Sister     Obesity Sister     Psychotic Disorder Sister     Neurologic Disorder Sister         migraines    Obesity Sister     Neurologic Disorder Daughter         migraines         Allergies   Allergies   Allergen Reactions    Nkda [No Known Drug Allergy]     Seasonal Allergies         Physical Exam   Vital Signs: Temp: 97.7  F (36.5  C) Temp src: Oral BP: 132/86 Pulse: 53   Resp: 16 SpO2: 94 % O2 Device: None (Room air)    Weight: 0 lbs 0 ozNo intake or output data in the 24 hours ending 07/12/25 0958     Gen:  Alert and conversational adult female in NAD  Eyes: Nonicteric  ENT: Mucous Membranes Moist  Pulm: Nonlabored Breathing on RA  CV: Normal heart rate, normotensive   Abd: Soft, moderately distended, tenderness to palpation RUQ and LUQ, no guarding, not peritonitic   Rectal: no external masses, adequate rectal tone, no bright red blood per rectum, no palpable masses  : No daigle present  Skin: WWP  Extremities: No peripheral edema  Neuro: CN grossly intact, no focal deficits  Psych: Appropriate in conversation         Data     I have personally reviewed the following data over the past 24 hrs:    5.2  \   10.7 (L)   / 331     140 104 15.7 /  75   4.3 24 1.54 (H) \     ALT: 120 (H) AST: 94 (H) AP: 139 TBILI: 0.3   ALB: 4.4 TOT PROTEIN: 7.2 LIPASE: N/A     Procal: N/A CRP: N/A Lactic Acid: 0.9       INR:  0.97 PTT:  N/A   D-dimer:  N/A Fibrinogen:  N/A       Imaging results reviewed over the past 24 hrs:   Recent Results (from the past 24 hours)   XR Abdomen Port 1 View    Narrative    EXAMINATION:  XR ABDOMEN PORT 1 VIEW 7/12/2025     COMPARISON: Abdominal radiograph 8/16/2019.    HISTORY: Sigmoid volv assess for perf.    TECHNIQUE: Frontal supine view of the abdomen.    FINDINGS: Gaseous dilation of multiple bowel loops without definite  evidence of free air, pneumatosis, or portal venous gas. Moderate to  large colonic stool burden throughout the right hemiabdomen. Contrast  material within the bladder.      Impression    IMPRESSION: Gaseous dilation of multiple loops of bowel. No definite  evidence of free air, though this is difficult to assess given supine  projection. Consider repeat upright radiograph for better sensitivity.    I have personally reviewed the examination and initial interpretation  and I agree with the findings.    COY JAMESON MD         SYSTEM ID:  [id number]

## 2025-07-13 ENCOUNTER — APPOINTMENT (OUTPATIENT)
Dept: GENERAL RADIOLOGY | Facility: CLINIC | Age: 60
End: 2025-07-13
Attending: STUDENT IN AN ORGANIZED HEALTH CARE EDUCATION/TRAINING PROGRAM
Payer: COMMERCIAL

## 2025-07-13 LAB
ANION GAP SERPL CALCULATED.3IONS-SCNC: 11 MMOL/L (ref 7–15)
BUN SERPL-MCNC: 12.5 MG/DL (ref 8–23)
CALCIUM SERPL-MCNC: 8.6 MG/DL (ref 8.8–10.4)
CHLORIDE SERPL-SCNC: 101 MMOL/L (ref 98–107)
CREAT SERPL-MCNC: 1.16 MG/DL (ref 0.51–0.95)
EGFRCR SERPLBLD CKD-EPI 2021: 53 ML/MIN/1.73M2
ERYTHROCYTE [DISTWIDTH] IN BLOOD BY AUTOMATED COUNT: 21.3 % (ref 10–15)
GLUCOSE SERPL-MCNC: 75 MG/DL (ref 70–99)
HCO3 SERPL-SCNC: 22 MMOL/L (ref 22–29)
HCT VFR BLD AUTO: 31.6 % (ref 35–47)
HGB BLD-MCNC: 9.3 G/DL (ref 11.7–15.7)
MAGNESIUM SERPL-MCNC: 2 MG/DL (ref 1.7–2.3)
MCH RBC QN AUTO: 23.3 PG (ref 26.5–33)
MCHC RBC AUTO-ENTMCNC: 29.4 G/DL (ref 31.5–36.5)
MCV RBC AUTO: 79 FL (ref 78–100)
PHOSPHATE SERPL-MCNC: 3.1 MG/DL (ref 2.5–4.5)
PLATELET # BLD AUTO: 282 10E3/UL (ref 150–450)
POTASSIUM SERPL-SCNC: 4.1 MMOL/L (ref 3.4–5.3)
RBC # BLD AUTO: 3.99 10E6/UL (ref 3.8–5.2)
SODIUM SERPL-SCNC: 134 MMOL/L (ref 135–145)
WBC # BLD AUTO: 5 10E3/UL (ref 4–11)

## 2025-07-13 PROCEDURE — 120N000002 HC R&B MED SURG/OB UMMC

## 2025-07-13 PROCEDURE — 84100 ASSAY OF PHOSPHORUS: CPT

## 2025-07-13 PROCEDURE — 74018 RADEX ABDOMEN 1 VIEW: CPT

## 2025-07-13 PROCEDURE — 80048 BASIC METABOLIC PNL TOTAL CA: CPT

## 2025-07-13 PROCEDURE — 250N000013 HC RX MED GY IP 250 OP 250 PS 637

## 2025-07-13 PROCEDURE — 85014 HEMATOCRIT: CPT

## 2025-07-13 PROCEDURE — 83735 ASSAY OF MAGNESIUM: CPT

## 2025-07-13 PROCEDURE — 36415 COLL VENOUS BLD VENIPUNCTURE: CPT

## 2025-07-13 PROCEDURE — 74018 RADEX ABDOMEN 1 VIEW: CPT | Mod: 26 | Performed by: RADIOLOGY

## 2025-07-13 PROCEDURE — 258N000003 HC RX IP 258 OP 636

## 2025-07-13 RX ADMIN — SODIUM CHLORIDE, SODIUM LACTATE, POTASSIUM CHLORIDE, AND CALCIUM CHLORIDE: .6; .31; .03; .02 INJECTION, SOLUTION INTRAVENOUS at 14:11

## 2025-07-13 RX ADMIN — ACETAMINOPHEN 975 MG: 325 TABLET ORAL at 19:39

## 2025-07-13 RX ADMIN — SODIUM CHLORIDE, SODIUM LACTATE, POTASSIUM CHLORIDE, AND CALCIUM CHLORIDE: .6; .31; .03; .02 INJECTION, SOLUTION INTRAVENOUS at 04:36

## 2025-07-13 RX ADMIN — LEVOTHYROXINE SODIUM 175 MCG: 0.17 TABLET ORAL at 08:56

## 2025-07-13 RX ADMIN — ACETAMINOPHEN 975 MG: 325 TABLET ORAL at 08:56

## 2025-07-13 RX ADMIN — ROPINIROLE HYDROCHLORIDE 0.5 MG: 0.5 TABLET, FILM COATED ORAL at 22:05

## 2025-07-13 RX ADMIN — ACETAMINOPHEN 975 MG: 325 TABLET ORAL at 14:49

## 2025-07-13 ASSESSMENT — ACTIVITIES OF DAILY LIVING (ADL)
ADLS_ACUITY_SCORE: 53
ADLS_ACUITY_SCORE: 30
ADLS_ACUITY_SCORE: 53
ADLS_ACUITY_SCORE: 49
ADLS_ACUITY_SCORE: 49
ADLS_ACUITY_SCORE: 32
ADLS_ACUITY_SCORE: 53
ADLS_ACUITY_SCORE: 30
ADLS_ACUITY_SCORE: 49
ADLS_ACUITY_SCORE: 53
ADLS_ACUITY_SCORE: 30
ADLS_ACUITY_SCORE: 31
ADLS_ACUITY_SCORE: 53
ADLS_ACUITY_SCORE: 49
ADLS_ACUITY_SCORE: 49
ADLS_ACUITY_SCORE: 32
ADLS_ACUITY_SCORE: 31
ADLS_ACUITY_SCORE: 53
ADLS_ACUITY_SCORE: 31
ADLS_ACUITY_SCORE: 53

## 2025-07-13 NOTE — PROGRESS NOTES
Colorectal Surgery Progress Note  [facility]      Subjective:  Patient reports feeling significant improvement in abdominal pain and distension since procedure with GI. She is passing gas but no BM yet. She feels hungry and would like to eat.    Vitals:  Vitals:    07/13/25 0131 07/13/25 0400 07/13/25 0736 07/13/25 0900   BP:  119/82 111/72    BP Location:   Right arm    Pulse:  61     Resp: 11 10     Temp:  97.7  F (36.5  C) 97.7  F (36.5  C)    TempSrc:  Oral Oral    SpO2:  97% 97% 96%     I/O:  I/O last 3 completed shifts:  In: 0   Out: 800 [Urine:800]    Physical Exam:  Gen: AAOx3, NAD  Pulm: Non-labored breathing on room air  Cardio: Regular rhythm on telemetry, rate ~50 bpm throughout exam  Abd: Soft, mildly distended, non-tender to palpation      BMP  Recent Labs   Lab 07/13/25  0621 07/12/25  1031   * 140   POTASSIUM 4.1 4.3   CHLORIDE 101 104   CO2 22 24   BUN 12.5 15.7   CR 1.16* 1.54*   GLC 75 75   MAG 2.0  --    PHOS 3.1  --      CBC  Recent Labs   Lab 07/13/25  0621 07/12/25  1031   WBC 5.0 5.2   HGB 9.3* 10.7*   HCT 31.6* 36.6    331         ASSESSMENT: This is a 60 year old female with history of Naima-en-Y gastric bypass (3/6/2012), substance use disorder, and history of small bowel obstruction who was transferred to the [place] ED from [facility] for concern for sigmoid volvulus. She underwent flexible sigmoidoscopy and successful detorsion and decompression of volvulus. Symptomatically she has improved      PLAN:  - AXR to ensure no continued volvulus  - advance to clear liquid diet  - continue mIVF while trialing diet  - multimodal pain control        Patient was seen and discussed with Dr. Jacek Marley, CRS Fellow. Plan of care was discussed with Dr. Deedee Gonzales, CRS Attending Physician.    Riana Mattson MD, PhD  General Surgery, PGY1  Pager: [phone]

## 2025-07-13 NOTE — PLAN OF CARE
Goal Outcome Evaluation:      Plan of Care Reviewed With: patient    Overall Patient Progress: no changeOverall Patient Progress: no change    Outcome Evaluation: No BM or Passing Gas    Time    /82 (BP Location: Right arm)   Pulse 62   Temp 97.5  F (36.4  C) (Oral)   Resp 23   SpO2 96%     Reason for admission: Sigmoid Volvulus   Activity: A-1/SBA, Walker to the BR  Pain: 7/10 gave scheduled tylenol, refused PRNs available, PT appears to be resting comfortably   Neuro: A&Ox4, lethargic from endoscopy, baseline neuropathy  Cardiac: WDL x bradycardia on tele-asymptomatic  Respiratory: WDL  GI/: WDL x has not passed gas or BM, hypoactive faint bowel sounds noted, abd soft and tender   Diet: NPO sips with meds  Lines: PIVx2 LR @100ml/hr  Skin: WDLx Mild edema on right hand       New changes this shift:     Plan: Encourage ambulation and supporting measures to improve bowel function       Continue to monitor and follow POC

## 2025-07-13 NOTE — PROGRESS NOTES
Nursing Focus: Admission    D: Patient admitted/transferred from ED via Bed for sigmoid volvulus .      I: Upon arrival to the unit patient was oriented to room, unit, and call light. Allergies reviewed and allergy band applied. MD notified of patient s arrival on the unit. Adult AVS completed  Yes,  by bedside RN. Head to toe assessment completed. Education assessment completed. Care plan initiated.     A: Vital signs stable upon admission. Patient rates pain at 8/10. Two RN skin assessment completed No. Bed Algorithm can be found in PCS flow sheets (Support Surface Algorithm) and on IP [facility] NURSE RESOURCE TAB, was this used during this assessment? No   P: Continue to monitor patient s BOWEL FUNCTION, HR, and pain, and intervene as needed. Continue with plan of care. Notify MD with any concerns or changes in patient status.

## 2025-07-13 NOTE — PROGRESS NOTES
Admitted/transferred from: Outside hospital  2 RN full   skin assessment completed by Fernanda Trinidad, RN and Rober Chow RN (5A)  Skin assessment finding: Bruising arms and scabs right arm.  Interventions/actions: None     Will continue to monitor.

## 2025-07-13 NOTE — PROGRESS NOTES
/84 (BP Location: Right arm)   Pulse 52   Temp 97.6  F (36.4  C) (Oral)   Resp 13   SpO2 98%      Shift: 9291-5352    C.C: Sigmoid Volvulus  Hx: Naima-en-y Gastric Bypass (03/2012), SBO. Flexible Sigmoidoscopy 07/12 (Successful Detorsion and Decompression of Volvulus).  Ambulation: Stand By Assist / Assist of 1. Patient Ambulated 2x During Shift.   Diet: Clear Liquid Diet  Neuro: A+Ox4. Baseline Neuropathy.  Resp: Room Air. Clear Lung Sounds. Unlabored Breathing. Bradypnea.  Cardio: Patient on Telemetry. Bradycardic.  GI/: Last Bowel Movement (07/ 07). No Bowel Movement During Shift. Patient Able to Pass Gas. Patient able to void spontaneously. Hypoactive Bowel Sounds.   Skin: Intact (Scab On R Arm Prior to Admission).   IV: 2 L PIV. L Arm PIV Infusing. L Hand PIV Saline Locked.  New This Shift: XRay @ 0800.   Plan: Continue with Plan of Care. Notify Provider of any changes.

## 2025-07-14 ENCOUNTER — APPOINTMENT (OUTPATIENT)
Dept: GENERAL RADIOLOGY | Facility: CLINIC | Age: 60
End: 2025-07-14
Payer: COMMERCIAL

## 2025-07-14 LAB
ANION GAP SERPL CALCULATED.3IONS-SCNC: 16 MMOL/L (ref 7–15)
ATRIAL RATE - MUSE: 50 BPM
BUN SERPL-MCNC: 6.2 MG/DL (ref 8–23)
CALCIUM SERPL-MCNC: 7.3 MG/DL (ref 8.8–10.4)
CHLORIDE SERPL-SCNC: 101 MMOL/L (ref 98–107)
CREAT SERPL-MCNC: 0.65 MG/DL (ref 0.51–0.95)
DIASTOLIC BLOOD PRESSURE - MUSE: NORMAL MMHG
EGFRCR SERPLBLD CKD-EPI 2021: >90 ML/MIN/1.73M2
ERYTHROCYTE [DISTWIDTH] IN BLOOD BY AUTOMATED COUNT: 21.9 % (ref 10–15)
GLUCOSE BLDC GLUCOMTR-MCNC: 61 MG/DL (ref 70–99)
GLUCOSE BLDC GLUCOMTR-MCNC: 99 MG/DL (ref 70–99)
GLUCOSE SERPL-MCNC: 52 MG/DL (ref 70–99)
HCO3 SERPL-SCNC: 14 MMOL/L (ref 22–29)
HCT VFR BLD AUTO: 36.9 % (ref 35–47)
HGB BLD-MCNC: 11 G/DL (ref 11.7–15.7)
INTERPRETATION ECG - MUSE: NORMAL
MAGNESIUM SERPL-MCNC: 1 MG/DL (ref 1.7–2.3)
MCH RBC QN AUTO: 23.4 PG (ref 26.5–33)
MCHC RBC AUTO-ENTMCNC: 29.8 G/DL (ref 31.5–36.5)
MCV RBC AUTO: 78 FL (ref 78–100)
P AXIS - MUSE: 53 DEGREES
PHOSPHATE SERPL-MCNC: 1.6 MG/DL (ref 2.5–4.5)
PLATELET # BLD AUTO: 283 10E3/UL (ref 150–450)
POTASSIUM SERPL-SCNC: 4.1 MMOL/L (ref 3.4–5.3)
PR INTERVAL - MUSE: 114 MS
QRS DURATION - MUSE: 82 MS
QT - MUSE: 484 MS
QTC - MUSE: 441 MS
R AXIS - MUSE: -10 DEGREES
RBC # BLD AUTO: 4.71 10E6/UL (ref 3.8–5.2)
SODIUM SERPL-SCNC: 131 MMOL/L (ref 135–145)
SYSTOLIC BLOOD PRESSURE - MUSE: NORMAL MMHG
T AXIS - MUSE: 91 DEGREES
VENTRICULAR RATE- MUSE: 50 BPM
WBC # BLD AUTO: 7.3 10E3/UL (ref 4–11)

## 2025-07-14 PROCEDURE — 80048 BASIC METABOLIC PNL TOTAL CA: CPT

## 2025-07-14 PROCEDURE — 258N000003 HC RX IP 258 OP 636

## 2025-07-14 PROCEDURE — 36415 COLL VENOUS BLD VENIPUNCTURE: CPT

## 2025-07-14 PROCEDURE — 83735 ASSAY OF MAGNESIUM: CPT

## 2025-07-14 PROCEDURE — 84100 ASSAY OF PHOSPHORUS: CPT

## 2025-07-14 PROCEDURE — 74170 CT ABD WO CNTRST FLWD CNTRST: CPT | Mod: 26 | Performed by: STUDENT IN AN ORGANIZED HEALTH CARE EDUCATION/TRAINING PROGRAM

## 2025-07-14 PROCEDURE — 250N000013 HC RX MED GY IP 250 OP 250 PS 637

## 2025-07-14 PROCEDURE — 85027 COMPLETE CBC AUTOMATED: CPT

## 2025-07-14 PROCEDURE — 999N000122 CT MHEALTH OVERREAD

## 2025-07-14 PROCEDURE — 74018 RADEX ABDOMEN 1 VIEW: CPT

## 2025-07-14 PROCEDURE — 74018 RADEX ABDOMEN 1 VIEW: CPT | Mod: 26 | Performed by: STUDENT IN AN ORGANIZED HEALTH CARE EDUCATION/TRAINING PROGRAM

## 2025-07-14 PROCEDURE — 120N000002 HC R&B MED SURG/OB UMMC

## 2025-07-14 RX ORDER — ROPINIROLE 0.25 MG/1
0.25 TABLET, FILM COATED ORAL
Status: DISCONTINUED | OUTPATIENT
Start: 2025-07-14 | End: 2025-07-16 | Stop reason: HOSPADM

## 2025-07-14 RX ADMIN — ACETAMINOPHEN 975 MG: 325 TABLET ORAL at 13:09

## 2025-07-14 RX ADMIN — ACETAMINOPHEN 975 MG: 325 TABLET ORAL at 08:10

## 2025-07-14 RX ADMIN — LEVOTHYROXINE SODIUM 175 MCG: 0.17 TABLET ORAL at 08:10

## 2025-07-14 RX ADMIN — ACETAMINOPHEN 975 MG: 325 TABLET ORAL at 19:24

## 2025-07-14 RX ADMIN — SODIUM CHLORIDE, SODIUM LACTATE, POTASSIUM CHLORIDE, AND CALCIUM CHLORIDE: .6; .31; .03; .02 INJECTION, SOLUTION INTRAVENOUS at 22:51

## 2025-07-14 ASSESSMENT — ACTIVITIES OF DAILY LIVING (ADL)
ADLS_ACUITY_SCORE: 34
ADLS_ACUITY_SCORE: 31
ADLS_ACUITY_SCORE: 34
ADLS_ACUITY_SCORE: 31
ADLS_ACUITY_SCORE: 34
ADLS_ACUITY_SCORE: 34
ADLS_ACUITY_SCORE: 31
ADLS_ACUITY_SCORE: 34
ADLS_ACUITY_SCORE: 34
ADLS_ACUITY_SCORE: 31
ADLS_ACUITY_SCORE: 31
ADLS_ACUITY_SCORE: 34
ADLS_ACUITY_SCORE: 31
ADLS_ACUITY_SCORE: 34
ADLS_ACUITY_SCORE: 31
ADLS_ACUITY_SCORE: 34

## 2025-07-14 NOTE — PLAN OF CARE
Goal Outcome Evaluation:      Plan of Care Reviewed With: patient    Overall Patient Progress: improvingOverall Patient Progress: improving    Outcome Evaluation: Passing gas and GI symptoms improving    Time    BP (!) 133/91 (BP Location: Right arm)   Pulse 61   Temp 97.6  F (36.4  C) (Oral)   Resp 10   SpO2 100%     Reason for admission: Sigmoid Volvulus   Activity: SBA W/ IV pole to BR  Pain: 3/10 controlled with scheduled Tylenol   Neuro: WDL x lethargic   Cardiac: WDL x Bradycardia  Respiratory: WDL x Bradypnea   GI/: WDL x (passing gas, no BM), abd soft and nontender, hypoactive bowel sounds  Diet: Clear Liquid Diet  Lines: PIV x 2 LR @100ml/hr  Skin: WDL x cool extremies       New changes this shift: Pain Improving, passing gas      Plan: Hopefully have BM      Continue to monitor and follow POC

## 2025-07-14 NOTE — PLAN OF CARE
"Goal Outcome Evaluation:      Plan of Care Reviewed With: patient    Overall Patient Progress: no changeOverall Patient Progress: no change    Outcome Evaluation: KUB ordered for bowel evaluation. Passing gas but no BM. Clear liquid diet - tolerating well.    [id number]    A&Ox4, able to make needs known. Doroteo was lethargic for a few hours late this morning to early afternoon, but still alert and answered any questions and followed any instructions given. Bradypneic. HR in the 60s-70s this shift. OVSS. On telemetry. On RA. Denies SOB/nausea. Reports numbness on bilateral hands from baseline neuropathy. C/o pain in hands this morning from the neuropathy and her room was very cold. Applied warm blankets on Doroteo and fixed her thermostat. These interventions, along with scheduled Tylenol, helped provide pain relief. Pt also c/o gas pains and \"feeling like I need to poop\" this morning. Pt is passing gas but had no BM yet. KUB was ordered. Tap water enema given this afternoon which resulted in a large BM, providing relief from pt's abdominal discomfort. 2 PIVs on L arm, L lower PIV infusing LR @ 75 mL/r. NPO since 1400 d/t possible OR tomorrow. Voiding w/o difficulty. 2+ edema on R hand. SBA, resting comfortably in between cares. Continue to keep patient NPO. Continue to monitor and follow POC.     "

## 2025-07-14 NOTE — PROGRESS NOTES
[facility]  Colorectal Surgery Progress Note  Date of Admission: 7/12/2025 07/14/2025  POD#2    ASSESSMENT/PLAN:   60 year old female with PMH of Naima-en-Y gastric bypass (3/6/2012), substance use disroder, h/o SBO edu was transferred to our ED from [facility] for sigmoid volvulus. Underwent flexible sigmoidoscopy and successful detorsion/decompression of volvulus 7/12. She continues to pas gas but not having BM at this time, has some persistent colon distension on most recent XR. Will order KUB to evaluate bowel again today, pending findings may consider operative intervention today or tomorrow for sigmoid resection.     Neuro/Pain:   - Multimodal pain control  CV:   - No acute concerns  PULM: Encourage IS  GI/FEN:   - Continue clear liquid diet  - IVF @ 50 ml/hr  - Saline lock PIV  Renal/:   - Monitor UOP   Heme:  - No concerns   ID:   - No concerns   Endocrine: No acute concerns  Activity: as tolerated, encourage OOB  Ppx: Activity/ambulation, SCDs; will consider chemical prophylaxis pending plan for OR     Medically Ready for Discharge: Anticipated in 5+ Days       Dispo: Will need to confirm persistent volvulus vs resolution (pending KUB). This will determine timing for OR (this admission vs future).     Clinically Significant Risk Factors         # Hyponatremia: Lowest Na = 131 mmol/L in last 2 days, will monitor as appropriate   # Hypocalcemia: Lowest Ca = 7.3 mg/dL in last 2 days, will monitor and replace as appropriate   # Hypomagnesemia: Lowest Mg = 1 mg/dL in last 2 days, will replace as needed                               This plan of care was communicated to me by CRS Staff Dr. Deedee Gonzales.    Emerald Rivera PA-C  Colon and Rectal Surgery  [facility]  Please search [facility]'s directory under Colon & Rectal Surgery / [facility] to view the current call schedule.    SUBJECTIVE:  Feeling well this AM, passing gas, no N/V.  No BM yet. Feels hungry, asking about diet.     OBJECTIVE:  Vitals:  Vitals:    07/13/25 1615 07/13/25 2048 07/13/25 2358 07/14/25 0509   BP: 125/84 (!) 133/91 128/86 123/89   BP Location: Right arm Right arm Right arm Right arm   Pulse: 52 61 62 68   Resp: 13 10 11 12   Temp: 97.6  F (36.4  C) 97.6  F (36.4  C) 97.6  F (36.4  C) 97.5  F (36.4  C)   TempSrc: Oral Oral Oral Oral   SpO2: 98% 100% 97% 97%       I/O:  I/O last 3 completed shifts:  In: 1850 [P.O.:650; I.V.:1200]  Out: 3200 [Urine:3200]    Physical Exam:  General: alert, oriented, in no acute distress, sitting comfortably  Respiratory: non-labored breathing on RA  Abdomen: Soft and moderately distended.Nontender. No guarding or peritoneal signs.     BMP  Recent Labs   Lab 07/14/25  0809 07/14/25  0746 07/14/25  0536 07/13/25  0621 07/12/25  1031   NA  --   --  131* 134* 140   POTASSIUM  --   --  4.1 4.1 4.3   CHLORIDE  --   --  101 101 104   CO2  --   --  14* 22 24   BUN  --   --  6.2* 12.5 15.7   CR  --   --  0.65 1.16* 1.54*   GLC 99 61* 52* 75 75   MAG  --   --  1.0* 2.0  --    PHOS  --   --  1.6* 3.1  --      CBC  Recent Labs   Lab 07/14/25  0733 07/13/25  0621 07/12/25  1031   WBC 7.3 5.0 5.2   HGB 11.0* 9.3* 10.7*   HCT 36.9 31.6* 36.6    282 331       Imaging:  Recent Results (from the past 24 hours)   XR Abdomen Port 1 View    Narrative    EXAMINATION:  XR ABDOMEN PORT 1 VIEW 7/13/2025     COMPARISON: 7/12/2025.    HISTORY: sigmoid volvulus s/p endoscopic decompression, eval for  interval change or recurrent volvulus.    TECHNIQUE: Frontal supine view of the abdomen.    FINDINGS: Postsurgical changes in the abdomen. Overall similar degree  of gaseous distention of bowel loops with moderate to large colonic  stool burden. No pneumatosis or portal venous air. Contrast material  within the urinary bladder.      Impression    IMPRESSION: Overall similar degree of gaseous distention of bowel  loops with moderate to large colonic stool burden.      COY JAMESON MD         SYSTEM ID:  [id number]

## 2025-07-14 NOTE — PHARMACY-ADMISSION MEDICATION HISTORY
Pharmacist Admission Medication History    Admission medication history is complete. The information provided in this note is only as accurate as the sources available at the time of the update.    Information Source(s): Patient and CareEverywhere/SureScripts via in-person    Pertinent Information:   Zolpidem- patient states she has some ambien at home, this was likely from a Rx from back in 2022 and no longer active.  Not added to the list. States VERY infrequent use of this medication.   Ferrous fumarate - Patient reports she is to be on this, however causes constipation and has not been taking.   Patient states that she hasn't been taking any meds at home the past 1.5 weeks so last doses marked as past month.       Changes made to PTA medication list:  Added: none  Deleted:   Ergocalciferol - pt states not taking, was originally just for 6 weeks written back in May 2024.  Unclear if was completed.   Changed:   Ropinrole - pt states takes more sporadically for RLS symptoms, and when she takes just takes 1 tablet. Changed to 1 tablet at bedtime PRN.     Allergies reviewed with patient and updates made in EHR: yes    Medication History Completed By: Lakhwinder Vanessa [facility] 7/14/2025 9:21 AM    Prior to Admission medications    Medication Sig Last Dose Taking? Auth Provider Long Term End Date   levothyroxine (SYNTHROID/LEVOTHROID) 175 MCG tablet TAKE 1 TABLET(175 MCG) BY MOUTH EVERY MORNING BEFORE BREAKFAST Past Month Yes Kelly Reyna MD Yes    rOPINIRole (REQUIP) 0.25 MG tablet Take 2 tablets (0.5 mg) by mouth at bedtime.  Patient taking differently: Take 0.25 mg by mouth nightly as needed (RLS symptoms). Past Month Yes Kelly Reyna MD Yes    ferrous fumarate-vitamin C ER (KEVIN-SEQUELS) 65-25 MG CR tablet Take 1 tablet by mouth daily.  Patient not taking: Reported on 2/25/2025   Jennifer Light PA-C

## 2025-07-15 LAB
ANION GAP SERPL CALCULATED.3IONS-SCNC: 11 MMOL/L (ref 7–15)
BUN SERPL-MCNC: 13.2 MG/DL (ref 8–23)
CALCIUM SERPL-MCNC: 8.9 MG/DL (ref 8.8–10.4)
CHLORIDE SERPL-SCNC: 101 MMOL/L (ref 98–107)
CREAT SERPL-MCNC: 1.23 MG/DL (ref 0.51–0.95)
EGFRCR SERPLBLD CKD-EPI 2021: 50 ML/MIN/1.73M2
GLUCOSE SERPL-MCNC: 77 MG/DL (ref 70–99)
HCO3 SERPL-SCNC: 24 MMOL/L (ref 22–29)
MAGNESIUM SERPL-MCNC: 2 MG/DL (ref 1.7–2.3)
PHOSPHATE SERPL-MCNC: 3.4 MG/DL (ref 2.5–4.5)
POTASSIUM SERPL-SCNC: 4.1 MMOL/L (ref 3.4–5.3)
SODIUM SERPL-SCNC: 136 MMOL/L (ref 135–145)

## 2025-07-15 PROCEDURE — 258N000003 HC RX IP 258 OP 636: Performed by: PHYSICIAN ASSISTANT

## 2025-07-15 PROCEDURE — 250N000013 HC RX MED GY IP 250 OP 250 PS 637

## 2025-07-15 PROCEDURE — 83735 ASSAY OF MAGNESIUM: CPT | Performed by: PHYSICIAN ASSISTANT

## 2025-07-15 PROCEDURE — 250N000013 HC RX MED GY IP 250 OP 250 PS 637: Performed by: PHYSICIAN ASSISTANT

## 2025-07-15 PROCEDURE — 84100 ASSAY OF PHOSPHORUS: CPT | Performed by: PHYSICIAN ASSISTANT

## 2025-07-15 PROCEDURE — 120N000002 HC R&B MED SURG/OB UMMC

## 2025-07-15 PROCEDURE — 80048 BASIC METABOLIC PNL TOTAL CA: CPT | Performed by: PHYSICIAN ASSISTANT

## 2025-07-15 PROCEDURE — 36415 COLL VENOUS BLD VENIPUNCTURE: CPT | Performed by: PHYSICIAN ASSISTANT

## 2025-07-15 RX ORDER — SIMETHICONE 80 MG
80 TABLET,CHEWABLE ORAL EVERY 6 HOURS PRN
Status: DISCONTINUED | OUTPATIENT
Start: 2025-07-15 | End: 2025-07-16 | Stop reason: HOSPADM

## 2025-07-15 RX ORDER — POLYETHYLENE GLYCOL 3350 17 G/17G
17 POWDER, FOR SOLUTION ORAL DAILY
Status: DISCONTINUED | OUTPATIENT
Start: 2025-07-15 | End: 2025-07-16 | Stop reason: HOSPADM

## 2025-07-15 RX ADMIN — SODIUM CHLORIDE, SODIUM LACTATE, POTASSIUM CHLORIDE, AND CALCIUM CHLORIDE: .6; .31; .03; .02 INJECTION, SOLUTION INTRAVENOUS at 12:58

## 2025-07-15 RX ADMIN — LEVOTHYROXINE SODIUM 175 MCG: 0.17 TABLET ORAL at 07:56

## 2025-07-15 RX ADMIN — ACETAMINOPHEN 975 MG: 325 TABLET ORAL at 22:33

## 2025-07-15 RX ADMIN — POLYETHYLENE GLYCOL 3350 17 G: 17 POWDER, FOR SOLUTION ORAL at 07:56

## 2025-07-15 RX ADMIN — Medication 5 MG: at 22:56

## 2025-07-15 RX ADMIN — SIMETHICONE 80 MG: 80 TABLET, CHEWABLE ORAL at 23:14

## 2025-07-15 ASSESSMENT — ACTIVITIES OF DAILY LIVING (ADL)
ADLS_ACUITY_SCORE: 34

## 2025-07-15 NOTE — PLAN OF CARE
Goal Outcome Evaluation:      Plan of Care Reviewed With: patient    Overall Patient Progress: improvingOverall Patient Progress: improving    Outcome Evaluation: Tap Water Enema Given    Time    /74 (BP Location: Right arm)   Pulse 62   Temp 98  F (36.7  C) (Oral)   Resp 12   Wt 61.6 kg (135 lb 12.8 oz)   SpO2 98%   BMI 25.24 kg/m      Reason for admission:   Activity: A-1 uses IV pole to BR  Pain: 0/10 scheduled tylenol given   Neuro: WDL x lethargic   Cardiac: WDL x Bradycardia, asymptomatic  Respiratory: WDL x Bradypnea, denies SOB  GI/: Tap water enema given @2230 successful, hypoactive BS, Passing gas   Diet: NPO sips with meds OK  Lines: PIV x2 LR @ 75ml/hr  Skin: WDL, cool extremities warm blankets and heat pack given, improved    Labs/imaging: None this shift       New changes this shift: Moderate BM after tap water enema given      Plan: Continue to give per shift tap water enema, NPO for possible OR tmmrw      Continue to monitor and follow POC

## 2025-07-15 NOTE — PLAN OF CARE
Goal Outcome Evaluation:      Plan of Care Reviewed With: patient    Overall Patient Progress: no changeOverall Patient Progress: no change    Outcome Evaluation: Regular diet - tolerating well. Had 2 BMs today, one before and one after the tap water enema was given. Denies pain and nausea.    [id number]    A&Ox4, able to make needs known. VSS. On RA. Denies SOB/pain/nausea. Baseline neuropathy on hands. Pt had a large, soft BM before and after tap water enema was given. Diet advanced to regular - tolerating well. Pt is in better spirits today and denies any pain or abdominal discomfort. Voiding w/o difficulty. On telemetry. LR running @ 75 mL/hr, 2nd PIV sl'd. Moderate edema on R hand. Up in chair this shift. SBA, resting comfortably in between cares. Pt refused her scheduled Tylenol due to having zero pain. Continue to monitor and follow POC.

## 2025-07-15 NOTE — PROGRESS NOTES
[facility]  Colorectal Surgery Progress Note  Date of Admission: 7/12/2025   07/15/2025  POD#3    ASSESSMENT/PLAN:   60 year old female with PMH of Naima-en-Y gastric bypass (3/6/2012), substance use disroder, h/o SBO edu was transferred to our ED from [facility] for sigmoid volvulus. Underwent flexible sigmoidoscopy and successful detorsion/decompression of volvulus 7/12. On 7/14 she underwent tap water enema with subsequent large volume BM. KUB showed improving gaseous distension and stool burden. Will hold off on surgical intervention today, although will continue to reevaluate daily.    Neuro/Pain:   - Multimodal pain control.  Minimize opiates.   CV:   - No acute concerns  PULM: Encourage IS  GI/FEN:   - clear liquid diet  - IVF @ 50 ml/hr  - start miralax daily  - repeat Tap Water enema today  - Saline lock PIV  Renal/:   - Monitor UOP   Heme:  - No concerns   ID:   - No concerns   Endocrine: No acute concerns  Activity: as tolerated, encourage OOB  Ppx: Activity/ambulation, SCDs; will consider chemical prophylaxis pending plan for OR     Medically Ready for Discharge: Anticipated in 5+ Days       Dispo:  Pending clinical improvement, regular diet, and confirmation of sustained volvulus resolution. This will determine timing for OR (this admission vs future).     Clinically Significant Risk Factors         # Hyponatremia: Lowest Na = 131 mmol/L in last 2 days, will monitor as appropriate     # Hypomagnesemia: Lowest Mg = 1 mg/dL in last 2 days, will replace as needed                           FELLOW ADDENDUM: I agree with note as written.  Large Bms and symptomatically improving after enemas.  Will advance to clear liquids, start miralax, additional tap water enemas to relieve stool burden    This plan of care was communicated to me by CRS Staff Dr. Deedee Gonzales.    Yaa Torres PA-C  Colon and Rectal Surgery  [facility]  [place]  Please search [facility]'s directory under Colon & Rectal Surgery / [facility] to view the current call schedule.    SUBJECTIVE:  Patient feeling much better this morning. Reports feeling less distended this morning. Reports large bowel movement after enema yesterday. Feeling hungry and would like to eat today if no plans for the OR.    OBJECTIVE:  Vitals:  Vitals:    07/14/25 1954 07/14/25 2000 07/14/25 2300 07/15/25 0315   BP: 123/74 123/74 119/76 125/70   BP Location: Right arm      Pulse:  73 52 65   Resp:  14 14 11   Temp: 98  F (36.7  C)  97.9  F (36.6  C) 97.7  F (36.5  C)   TempSrc: Oral      SpO2: 98%  100% 98%   Weight:           I/O:  I/O last 3 completed shifts:  In: 919.58 [P.O.:120; I.V.:799.58]  Out: 250 [Urine:250]    Physical Exam:  General: alert, oriented, in no acute distress, laying comfortably in chair  Respiratory: non-labored breathing on RA  Abdomen: Soft and significantly improved distension. Nontender. No guarding or peritoneal signs.     BMP  Recent Labs   Lab 07/14/25  0809 07/14/25  0746 07/14/25  0536 07/13/25  0621 07/12/25  1031   NA  --   --  131* 134* 140   POTASSIUM  --   --  4.1 4.1 4.3   CHLORIDE  --   --  101 101 104   CO2  --   --  14* 22 24   BUN  --   --  6.2* 12.5 15.7   CR  --   --  0.65 1.16* 1.54*   GLC 99 61* 52* 75 75   MAG  --   --  1.0* 2.0  --    PHOS  --   --  1.6* 3.1  --      CBC  Recent Labs   Lab 07/14/25  0733 07/13/25  0621 07/12/25  1031   WBC 7.3 5.0 5.2   HGB 11.0* 9.3* 10.7*   HCT 36.9 31.6* 36.6    282 331       Imaging:  Recent Results (from the past 24 hours)   CT [facility]    Narrative    EXAMINATION: CT [facility], 7/14/2025 9:14 AM    TECHNIQUE: Outside films dated 7/11/2025 were submitted for  interpretation. CT images of the abdomen were performed without and  with contrast.    COMPARISON: None    PREVIOUS REPORT: The original interpretation was  available for review  at the time of this dictation.     HISTORY:    FINDINGS:    This report is designed to answer a focused clinical  question and should be interpreted in conjunction with the original  report.     Distal sigmoid colon and rectum is collapsed and leads back to a  proximal transition point associated with mesenteric whirlpool sign.    Transverse colon traverses inferior to the sigmoid mesocolon with the  volvulus, there are additional transition points more proximally, with  not as significantly dilated loops.    Agree with original report. Remaining abdominal structures as  previously described.      Impression    IMPRESSION:   Sigmoid volvulus as described in the original report. Redundant  sigmoid colon is moderately distended with gas and stool.       I have personally reviewed the examination and initial interpretation  and I agree with the findings.    ALISHA SU DO         SYSTEM ID:  [id number]   XR Abdomen Port 1 View    Narrative    EXAMINATION:  XR ABDOMEN PORT 1 VIEW 7/14/2025 2:42 PM     COMPARISON: Abdominal radiograph 7/13/2025    HISTORY: Concern for volvulus.    FINDINGS: Supine AP portable views of the abdomen. Multiple air-filled  distended loops of bowel with moderate to large volume stool burden.  Gaseous distention of the rectum. No pneumatosis or portal venous gas.  Lung bases are clear.      Impression    IMPRESSION: Interval decrease in gaseous distention with persistent  few prominent air-filled loops of bowel and moderate to large stool  burden.    I have personally reviewed the examination and initial interpretation  and I agree with the findings.    ANNIE TUCKER MD         SYSTEM ID:  [id number]

## 2025-07-16 VITALS
DIASTOLIC BLOOD PRESSURE: 85 MMHG | TEMPERATURE: 98 F | SYSTOLIC BLOOD PRESSURE: 130 MMHG | BODY MASS INDEX: 25.13 KG/M2 | OXYGEN SATURATION: 98 % | HEART RATE: 81 BPM | RESPIRATION RATE: 16 BRPM | WEIGHT: 135.2 LBS

## 2025-07-16 PROCEDURE — 250N000013 HC RX MED GY IP 250 OP 250 PS 637

## 2025-07-16 PROCEDURE — 258N000003 HC RX IP 258 OP 636: Performed by: PHYSICIAN ASSISTANT

## 2025-07-16 PROCEDURE — 250N000013 HC RX MED GY IP 250 OP 250 PS 637: Performed by: PHYSICIAN ASSISTANT

## 2025-07-16 RX ORDER — ACETAMINOPHEN 325 MG/1
650 TABLET ORAL 3 TIMES DAILY PRN
Qty: 45 TABLET | Refills: 0 | Status: SHIPPED | OUTPATIENT
Start: 2025-07-16

## 2025-07-16 RX ORDER — POLYETHYLENE GLYCOL 3350 17 G/17G
17 POWDER, FOR SOLUTION ORAL DAILY
Qty: 60 PACKET | Refills: 0 | Status: SHIPPED | OUTPATIENT
Start: 2025-07-17

## 2025-07-16 RX ADMIN — ROPINIROLE HYDROCHLORIDE 0.25 MG: 0.25 TABLET, FILM COATED ORAL at 02:10

## 2025-07-16 RX ADMIN — SODIUM CHLORIDE, SODIUM LACTATE, POTASSIUM CHLORIDE, AND CALCIUM CHLORIDE: .6; .31; .03; .02 INJECTION, SOLUTION INTRAVENOUS at 01:23

## 2025-07-16 RX ADMIN — LEVOTHYROXINE SODIUM 175 MCG: 0.17 TABLET ORAL at 08:19

## 2025-07-16 RX ADMIN — POLYETHYLENE GLYCOL 3350 17 G: 17 POWDER, FOR SOLUTION ORAL at 08:19

## 2025-07-16 RX ADMIN — ACETAMINOPHEN 975 MG: 325 TABLET ORAL at 08:19

## 2025-07-16 ASSESSMENT — ACTIVITIES OF DAILY LIVING (ADL)
ADLS_ACUITY_SCORE: 34

## 2025-07-16 NOTE — PLAN OF CARE
[id number]     Goal Outcome Evaluation:      Plan of Care Reviewed With: patient    Overall Patient Progress: no changeOverall Patient Progress: no change           A&Ox4. Afebrile.  VSS on RA. Continuous pulse ox. SBA. Denies SOB, dizziness and N/V. Gas pain later in evening- provider paged and received order for PRN simethicone. Trouble sleeping and requested something paged provider and received order for PRN melatonin. Pt continued to not be able to sleep and having restless legs requesting something else. Provider paged and awaiting reply. PIV x2. Hand SL and arm infusing LR @75mL/hr.  Uses call light appropriately. Voiding spontaneously w/ AUOP. LBM 7/15/25.  Able to make needs known. No acute events during shift.    Significant 24 hour events: voided. Started on LR IVMF. Admitted to 5A.     Level of Care: Acute    Summary Type: 5A Post Op Report   POD: #4 = 7/16 Flexible sigmoidoscopy   Complications:    Pain:  controlled w/ scheduled tylenol  Neuro:WDL drowsy.   CV:WDL, rhythm bradycardic at times  Resp:WDL, effort/expansion, mucous membranes, nailbeds RA, bradypneic  GI:.WDL except abdominal pain. Distention. Tap water enemas  :WDL, voiding ability/characteristics, urinevoiding spontaneously.   Skin: .WDL except, moisture, elasticity PIV x2  Activity Assistance: assistance, stand-by Assist x 1   Safety/Falls Risk: Level of Risk: High Risk Assist x 1  Consults: colorectal   Procedures/Imaging: Abdominal Xray. Endoscopy   Precautions: Fall Risk Precautions        Continue to monitor and follow POC           Iliana Mckeon, RN......7/16/2025 7:00 AM

## 2025-07-16 NOTE — PLAN OF CARE
Goal Outcome Evaluation:      Plan of Care Reviewed With: patient    Overall Patient Progress: improvingOverall Patient Progress: improving    Outcome Evaluation: Discharge to home    Nursing Focus: Discharge    D: Patient discharged to home (parents' home) at 11:38am. Patient left via wheelchair and accompanied by transport staff.    I: Discharge prescriptions sent to discharge pharmacy to be filled. All discharge medications and instructions reviewed with her by bedside RN. Patient instructed to call clinic triage nurse if she experiences a fever >100.4, uncontrolled nausea, vomiting, diarrhea, or pain; or experiences any signs or symptoms of bleeding. Other phone numbers to call with questions or concerns after discharge reviewed. PIV x2 removed. Education completed.    A: Mary verbalized understanding of discharge medications and instructions. Patient will  medications at discharge pharmacy.      P: Patient to follow-up with CRS in clinic in 2-3 weeks, awaiting phone call by clinic staff but knows to call in 2 days if the appointment isn't set up yet.       AVSS on room air. Pain managed with Tylenol. On a regular diet. Up with stand by assist. Voided spontaneously. Passed gas, no BM this shift. LBM 7/15

## 2025-07-16 NOTE — DISCHARGE SUMMARY
[facility]  Discharge Summary  Colon and Rectal Surgery     Jackie Rodriguez MRN# [id number]   YOB: 1965 Age: 60 year old     Date of Admission:  7/12/2025  Date of Discharge::  7/16/2025  Admitting Physician:  Deedee Gonzales MD  Discharge Physician:    Primary Care Physician:        Kelly Reyna          Admission Diagnoses:   Sigmoid volvulus (H) [K56.2]          Discharge Diagnosis:   Sigmoid volvulus         Procedures:   None          Consultations:   COLORECTAL SURGERY ADULT IP CONSULT  GI LUMINAL ADULT IP CONSULT  MINIMALLY INVASIVE SURGERY (MIS) ADULT IP CONSULT         Imaging Studies:     Results for orders placed or performed during the hospital encounter of 07/12/25   XR Abdomen Port 1 View    Narrative    EXAMINATION:  XR ABDOMEN PORT 1 VIEW 7/12/2025     COMPARISON: Abdominal radiograph 8/16/2019.    HISTORY: Sigmoid volv assess for perf.    TECHNIQUE: Frontal supine view of the abdomen.    FINDINGS: Gaseous dilation of multiple bowel loops without definite  evidence of free air, pneumatosis, or portal venous gas. Moderate to  large colonic stool burden throughout the right hemiabdomen. Contrast  material within the bladder.      Impression    IMPRESSION: Gaseous dilation of multiple loops of bowel. No definite  evidence of free air, though this is difficult to assess given supine  projection. Consider repeat upright radiograph for better sensitivity.    I have personally reviewed the examination and initial interpretation  and I agree with the findings.    COY JAMESON MD         SYSTEM ID:  [id number]   CT [facility]    Narrative    EXAMINATION: CT [facility], 7/14/2025 9:14 AM    TECHNIQUE: Outside films dated 7/11/2025 were submitted for  interpretation. CT images of the abdomen were performed without and  with contrast.    COMPARISON: None    PREVIOUS REPORT: The original interpretation was  available for  review  at the time of this dictation.     HISTORY:    FINDINGS:   This report is designed to answer a focused clinical  question and should be interpreted in conjunction with the original  report.     Distal sigmoid colon and rectum is collapsed and leads back to a  proximal transition point associated with mesenteric whirlpool sign.    Transverse colon traverses inferior to the sigmoid mesocolon with the  volvulus, there are additional transition points more proximally, with  not as significantly dilated loops.    Agree with original report. Remaining abdominal structures as  previously described.      Impression    IMPRESSION:   Sigmoid volvulus as described in the original report. Redundant  sigmoid colon is moderately distended with gas and stool.       I have personally reviewed the examination and initial interpretation  and I agree with the findings.    ALISHA SU DO         SYSTEM ID:  [id number]   XR Abdomen Port 1 View    Narrative    EXAMINATION:  XR ABDOMEN PORT 1 VIEW 7/13/2025     COMPARISON: 7/12/2025.    HISTORY: sigmoid volvulus s/p endoscopic decompression, eval for  interval change or recurrent volvulus.    TECHNIQUE: Frontal supine view of the abdomen.    FINDINGS: Postsurgical changes in the abdomen. Overall similar degree  of gaseous distention of bowel loops with moderate to large colonic  stool burden. No pneumatosis or portal venous air. Contrast material  within the urinary bladder.      Impression    IMPRESSION: Overall similar degree of gaseous distention of bowel  loops with moderate to large colonic stool burden.     COY JAMESON MD         SYSTEM ID:  [id number]   XR Abdomen Port 1 View    Narrative    EXAMINATION:  XR ABDOMEN PORT 1 VIEW 7/14/2025 2:42 PM     COMPARISON: Abdominal radiograph 7/13/2025    HISTORY: Concern for volvulus.    FINDINGS: Supine AP portable views of the abdomen. Multiple air-filled  distended loops of bowel with moderate to large volume stool  burden.  Gaseous distention of the rectum. No pneumatosis or portal venous gas.  Lung bases are clear.      Impression    IMPRESSION: Interval decrease in gaseous distention with persistent  few prominent air-filled loops of bowel and moderate to large stool  burden.    I have personally reviewed the examination and initial interpretation  and I agree with the findings.    ANNIE TUCKER MD         SYSTEM ID:  [id number]              Medications Prior to Admission:     Medications Prior to Admission   Medication Sig Dispense Refill Last Dose/Taking    levothyroxine (SYNTHROID/LEVOTHROID) 175 MCG tablet TAKE 1 TABLET(175 MCG) BY MOUTH EVERY MORNING BEFORE BREAKFAST 90 tablet 0 Past Month    rOPINIRole (REQUIP) 0.25 MG tablet Take 2 tablets (0.5 mg) by mouth at bedtime. (Patient taking differently: Take 0.25 mg by mouth nightly as needed (restless leg symptoms).) 60 tablet 11 Past Month    [DISCONTINUED] ferrous fumarate-vitamin C ER (KEVIN-SEQUELS) 65-25 MG CR tablet Take 1 tablet by mouth daily. (Patient not taking: Reported on 2/25/2025) 90 tablet 1               Discharge Medications:     Current Discharge Medication List        START taking these medications    Details   acetaminophen (TYLENOL) 325 MG tablet Take 2 tablets (650 mg) by mouth 3 times daily as needed for mild pain.  Qty: 45 tablet, Refills: 0    Associated Diagnoses: Sigmoid volvulus (H)      polyethylene glycol (MIRALAX) 17 g packet Take 17 g by mouth daily.  Qty: 60 packet, Refills: 0    Associated Diagnoses: Sigmoid volvulus (H)           CONTINUE these medications which have NOT CHANGED    Details   levothyroxine (SYNTHROID/LEVOTHROID) 175 MCG tablet TAKE 1 TABLET(175 MCG) BY MOUTH EVERY MORNING BEFORE BREAKFAST  Qty: 90 tablet, Refills: 0    Associated Diagnoses: Hypothyroidism, unspecified type      rOPINIRole (REQUIP) 0.25 MG tablet Take 2 tablets (0.5 mg) by mouth at bedtime.  Qty: 60 tablet, Refills: 11    Associated Diagnoses: Restless  legs syndrome           STOP taking these medications       ferrous fumarate-vitamin C ER (KEVIN-SEQUELS) 65-25 MG CR tablet Comments:   Reason for Stopping:                        Brief History of Illness:   Jackie Rodriguez is a 60 year old female with PMHx of Naima-en-Y gastric bypass (3/6/2012), substance use disorder, and history of small bowel obstruction who transferred to the [place] ED on 7/12/2025 from [facility] for concern for sigmoid volvulus. She had not had a bowel movement for 6 days with associated abdominal distension, but had been passing some gas. Typically would have bowel movements every 2-3 days. Initial workup by the ED showed normal vitals, WBC, and lactate with elevated creatinine, ALT, AST. Abdominal x-ray on presentation notable for gaseous bowel dilation with large colonic stool burden throughout right hemiabdomen consistent with sigmoid volvulus.           Hospital Course:   Patient was made NPO on admission and placed on mIVF. She underwent flexible sigmoidoscopy with detorsion with GI on 7/12 with significant clinical improvement in pain and abdominal distension; however, abdominal x-ray on 7/13 showed persisting bowel dilation and stool burden. At this point, she was passing gas, but not yet having a bowel movement. Diet was slowly advanced per patient request. She underwent additional abdominal x-ray on 7/14 which showed notable improvement in distension and stool burden. On 7/14, a tap water enema was administered which resulted in a large volume bowel movement. She underwent additional tap water enema on 7/15 with 2 additional bowel movements. She was started on miralax and simethicone and was advanced to a regular diet.    Patient is to follow up with the Colon and Rectal Surgery Clinic in 2-3 weeks with Dr. Gonzales to schedule sigmoid resection given she is high risk for volvulus recurrence.         Day of Discharge Physical Exam:   Blood pressure 130/85,  "pulse 81, temperature 98  F (36.7  C), temperature source Oral, resp. rate 16, weight 61.3 kg (135 lb 3.2 oz), SpO2 98%, not currently breastfeeding.    Gen: Laying comfortably in chair, NAD  Pulm: Non-labored breathing  Abd: Soft, non-tender, minimal distension, no guarding  Ext:  Warm and well-perfused         Final Pathology Result:   No pathology submitted    Results for orders placed or performed during the hospital encounter of 10/21/13   Surgical pathology exam    Collection Time: 10/21/13 10:12 AM   Result Value Ref Range    Copath Report       Patient Name: PAULETTE RAMOS  MR#: [id number]  Specimen #: [id number]  Collected: 10/21/2013  Received: 10/21/2013  Reported: 10/22/2013 10:15  Ordering Phy(s): VICK CRAWFORD              SPECIMEN(S):  Left ovary    FINAL DIAGNOSIS:  Left ovary, oophorectomy:        - Corpus luteum cyst.    Electronically signed out by:    Kenneth Estrella M.D., PhD      CLINICAL HISTORY:  48-year-old female.  Dyspareunia.      GROSS:  One specimen is received labeled with the patient's name and hospital  number.    The specimen is designated \"left ovary.\"  The specimen consists of an  ovary measuring 3.5 x 2.5 x 1.5 cm.  The outer surface is unremarkable.  Cut sections show a cyst lined by smooth lining with no solid or  papillary areas measuring 1.2 cm in greatest dimension.  The specimen is  serially sectioned and entirely submitted in three cassettes.  TILA Estrella MD, PhD/dak    MICROSCOPIC:  Microscopic evaluation is performed.          TESTING LAB LOCATION:  [address] [address] [phone]    COLLECTION SITE:  Client: [facility]  Location: [facility] ([facility])              Discharge Instructions and Follow-Up:     Discharge Procedure Orders   Reason for your hospital stay   Order Comments: Sigmoid volvulus, s/p flexible sigmoidoscopy & detorsion     Activity   Order Comments: " Your activity upon discharge: activity as tolerated     Order Specific Question Answer Comments   Is discharge order? Yes      ADULT [facility] Specialty Follow-up and recommended labs and tests   Order Comments: NOTIFY  Please contact Delilah LOAIZA RN, Sandy GARVIN RN, or Maribel MACK RN at [phone] for problems after discharge such as:  -Temperature > 101F, chills, rigors, dizziness  -Inability to tolerate diet, nausea or vomiting  -You stop passing gas, develop significant bloating, abdominal pain  -Have blood in stools/vomit  -Have severe diarrhea/constipation  -Any other questions or concerns.  -At nights (after 4:30pm), on weekends, or if urgent, call [phone] and ask the  to speak with the on-call Colorectal Surgery resident or fellow      Medication Instructions  Some of your medications may have changed. Please take only prescribed and resumed medications     FOLLOW-UP  1.  You will need to follow-up with CRS Staff: Dr. Gonzales in 2-3 weeks. The colorectal team will contact you regarding availability. Please contact our Nurses (phone # [phone]) if you have not heard from our clinic in 3 business days after discharge to schedule a follow-up appointment.    2.  Follow up with your primary care provider in 1-2 weeks after discharge from the hospital to review this hospitalization.     Diet   Order Comments: Follow this diet upon discharge: Advance to a regular diet as tolerated and hydrate well     Order Specific Question Answer Comments   Is discharge order? Yes             Home Health Care:     Not needed           Discharge Disposition:     Discharged to home      Condition at discharge: Stable      Michaela Oneal, MS4    I was present with the medical student who participated in the service and in the documentation of the note. I agree with the assessment and plan of care as documented, with edits made as appropriate.    Patient was seen and discussed with Dr. Reese Condon, CRS Fellow. Plan  of care was discussed with Dr. Deedee Gonzales, CRS Attending Physician.    Riana Mattson MD, PhD  General Surgery, PGY1  Pager: [phone]          Staff Addendum:  Agree with the discharge summary as documented. I was personally involved with the discharge planning and hospital decision-making for this patient.  Deedee Gonzales MD  Colon and Rectal Surgery Staff  [facility]

## 2025-07-18 DIAGNOSIS — K56.2 SIGMOID VOLVULUS (H): Primary | ICD-10-CM

## 2025-07-22 NOTE — TELEPHONE ENCOUNTER
Diagnosis, Referred by & from: discuss surgery    Appt date: 7/30/25   NOTES STATUS DETAILS   OFFICE NOTE from other specialist [facility]:  5/23/24 - OV FP w/ Jennifer Light PA-C   DISCHARGE SUMMARY from hospital [facility]:  7/12/25 - ED to Admission w/   Deedee Gonzales MD   8/13/19 - Admission w/ Estefani Lovett MD    DISCHARGE REPORT from the ER [facility]:  7/12/25 - ED note w/ Zoë Mason MD    MEDICATION LIST Internal    LABS N/A    DIAGNOSTIC PROCEDURES     COLONOSCOPY (most recent all time after 5 years) [facility]:  12/7/16   FLEX SIGMOIDOSCOPY [facility]:  7/12/25   IMAGING (DISC & REPORT)      CT [facility]:  8/13/19 - CT ABDOMEN PELVIS   XRAY [facility]:  7/14/25 - XR ABDOMEN  7/13/25 - XR ABDOMEN  7/12/25 - XR ABDOMEN  8/16/19 - XR ABDOMEN  8/14/19 - XR ABDOMEN  8/13/19 - XR ABDOMEN   ULTRASOUND  (ENDOANAL/ENDORECTAL) [facility]:  8/13/19 - US ABDOMEN      Patient/Caregiver provided printed discharge information.

## 2025-07-30 ENCOUNTER — PRE VISIT (OUTPATIENT)
Dept: SURGERY | Facility: CLINIC | Age: 60
End: 2025-07-30

## 2025-07-30 ENCOUNTER — OFFICE VISIT (OUTPATIENT)
Dept: SURGERY | Facility: CLINIC | Age: 60
End: 2025-07-30
Payer: COMMERCIAL

## 2025-07-30 VITALS
SYSTOLIC BLOOD PRESSURE: 117 MMHG | HEIGHT: 62 IN | OXYGEN SATURATION: 100 % | HEART RATE: 84 BPM | WEIGHT: 127.1 LBS | DIASTOLIC BLOOD PRESSURE: 77 MMHG | BODY MASS INDEX: 23.39 KG/M2

## 2025-07-30 DIAGNOSIS — K56.2 SIGMOID VOLVULUS (H): Primary | ICD-10-CM

## 2025-07-30 DIAGNOSIS — K59.09 CHRONIC CONSTIPATION: ICD-10-CM

## 2025-07-30 PROCEDURE — 3074F SYST BP LT 130 MM HG: CPT | Performed by: COLON & RECTAL SURGERY

## 2025-07-30 PROCEDURE — 1126F AMNT PAIN NOTED NONE PRSNT: CPT | Performed by: COLON & RECTAL SURGERY

## 2025-07-30 PROCEDURE — 99204 OFFICE O/P NEW MOD 45 MIN: CPT | Performed by: COLON & RECTAL SURGERY

## 2025-07-30 PROCEDURE — 3078F DIAST BP <80 MM HG: CPT | Performed by: COLON & RECTAL SURGERY

## 2025-07-30 RX ORDER — ONDANSETRON 4 MG/1
4 TABLET, FILM COATED ORAL EVERY 6 HOURS
Qty: 3 TABLET | Refills: 0 | Status: SHIPPED | OUTPATIENT
Start: 2025-07-30

## 2025-07-30 RX ORDER — NEOMYCIN SULFATE 500 MG/1
1000 TABLET ORAL EVERY 6 HOURS
Qty: 6 TABLET | Refills: 0 | Status: SHIPPED | OUTPATIENT
Start: 2025-07-30

## 2025-07-30 RX ORDER — METRONIDAZOLE 500 MG/1
500 TABLET ORAL EVERY 6 HOURS
Qty: 3 TABLET | Refills: 0 | Status: SHIPPED | OUTPATIENT
Start: 2025-07-30

## 2025-07-30 RX ORDER — POLYETHYLENE GLYCOL 3350 17 G/17G
POWDER, FOR SOLUTION ORAL
Qty: 476 G | Refills: 0 | Status: SHIPPED | OUTPATIENT
Start: 2025-07-30

## 2025-07-30 ASSESSMENT — PAIN SCALES - GENERAL: PAINLEVEL_OUTOF10: NO PAIN (0)

## 2025-07-31 ENCOUNTER — TELEPHONE (OUTPATIENT)
Dept: SURGERY | Facility: CLINIC | Age: 60
End: 2025-07-31
Payer: COMMERCIAL

## 2025-07-31 NOTE — TELEPHONE ENCOUNTER
Called patient to schedule surgery with Dr. Deedee Gonzales     Spoke with: Doroteo (patient)     Date of Surgery: Friday 9/29/2025    Estimated Arrival time Discussed with Patient:  No    Location of surgery: [facility]     Pre-Op H&P: PAC Video Visit on 8/14/2025 at 2:00 pm    WOC: Not Applicable     Labs: Yes 8/14/2025 at [facility] at 3:30 PM    Imaging: No (unless ordered by H&P provider)    2-3 week Post-Op Appt Date w/ NP/PA:  Porsche Payne PA-C [id number]?@[id number] at 12:00 PM    2ND Post-Op Appt Date w/ Surgeon:  Dr. Deedee Gonzales 10/15/2025 at 6:30 PM via Video Visit     Bowel Prep:  Yes  Full Prep with Antibiotics Sent via Key Ring Message    Discussed with patient PAC RN will provide arrival time and instructions for surgery at the time of the appointment: [[facility] only]: Yes      Standard Surgery Packet Sent: to be sent once clarification of Bowel Prep instructions has been received via [employer] Message      Additional Comments:   - awaiting Bowel Prep instructions from RNCC       All patients questions were answered and was instructed to review surgical packet and call back with any questions or concerns.       Stephenie medina on 7/31/2025 at 3:13 PM

## 2025-08-11 ENCOUNTER — TELEPHONE (OUTPATIENT)
Dept: GASTROENTEROLOGY | Facility: CLINIC | Age: 60
End: 2025-08-11
Payer: COMMERCIAL

## 2025-08-14 ENCOUNTER — PRE VISIT (OUTPATIENT)
Dept: SURGERY | Facility: CLINIC | Age: 60
End: 2025-08-14

## 2025-08-14 ENCOUNTER — ANESTHESIA EVENT (OUTPATIENT)
Dept: SURGERY | Facility: CLINIC | Age: 60
DRG: 330 | End: 2025-08-14
Payer: COMMERCIAL

## 2025-08-14 ENCOUNTER — VIRTUAL VISIT (OUTPATIENT)
Dept: SURGERY | Facility: CLINIC | Age: 60
End: 2025-08-14
Payer: COMMERCIAL

## 2025-08-14 VITALS — BODY MASS INDEX: 23.37 KG/M2 | HEIGHT: 62 IN | WEIGHT: 127 LBS

## 2025-08-14 DIAGNOSIS — Z01.818 PREOP EXAMINATION: Primary | ICD-10-CM

## 2025-08-14 DIAGNOSIS — K56.2 SIGMOID VOLVULUS (H): ICD-10-CM

## 2025-08-14 ASSESSMENT — LIFESTYLE VARIABLES: TOBACCO_USE: 1

## 2025-08-14 ASSESSMENT — ENCOUNTER SYMPTOMS
DYSRHYTHMIAS: 0
SEIZURES: 0

## 2025-08-14 ASSESSMENT — PAIN SCALES - GENERAL: PAINLEVEL_OUTOF10: MILD PAIN (1)

## 2025-08-19 ENCOUNTER — TELEPHONE (OUTPATIENT)
Dept: SURGERY | Facility: CLINIC | Age: 60
End: 2025-08-19
Payer: COMMERCIAL

## 2025-08-24 LAB
ABO + RH BLD: NORMAL
BLD GP AB SCN SERPL QL: NEGATIVE
SPECIMEN EXP DATE BLD: NORMAL

## 2025-08-25 ENCOUNTER — LAB (OUTPATIENT)
Dept: LAB | Facility: CLINIC | Age: 60
End: 2025-08-25
Payer: COMMERCIAL

## 2025-08-25 DIAGNOSIS — K56.2 SIGMOID VOLVULUS (H): ICD-10-CM

## 2025-08-25 DIAGNOSIS — Z01.818 PREOP EXAMINATION: ICD-10-CM

## 2025-08-25 LAB
ALBUMIN SERPL BCG-MCNC: 4.3 G/DL (ref 3.5–5.2)
ALP SERPL-CCNC: 118 U/L (ref 40–150)
ALT SERPL W P-5'-P-CCNC: 36 U/L (ref 0–50)
ANION GAP SERPL CALCULATED.3IONS-SCNC: 11 MMOL/L (ref 7–15)
AST SERPL W P-5'-P-CCNC: 57 U/L (ref 0–45)
BASOPHILS # BLD AUTO: 0.05 10E3/UL (ref 0–0.2)
BASOPHILS NFR BLD AUTO: 1.2 %
BILIRUB SERPL-MCNC: 0.3 MG/DL
BUN SERPL-MCNC: 18.3 MG/DL (ref 8–23)
CALCIUM SERPL-MCNC: 9.5 MG/DL (ref 8.8–10.4)
CHLORIDE SERPL-SCNC: 105 MMOL/L (ref 98–107)
CREAT SERPL-MCNC: 1.2 MG/DL (ref 0.51–0.95)
EGFRCR SERPLBLD CKD-EPI 2021: 51 ML/MIN/1.73M2
EOSINOPHIL # BLD AUTO: 0.27 10E3/UL (ref 0–0.7)
EOSINOPHIL NFR BLD AUTO: 6.3 %
ERYTHROCYTE [DISTWIDTH] IN BLOOD BY AUTOMATED COUNT: 18.6 % (ref 10–15)
GLUCOSE SERPL-MCNC: 49 MG/DL (ref 70–99)
HCO3 SERPL-SCNC: 25 MMOL/L (ref 22–29)
HCT VFR BLD AUTO: 33.3 % (ref 35–47)
HGB BLD-MCNC: 10 G/DL (ref 11.7–15.7)
IMM GRANULOCYTES # BLD: <0.04 10E3/UL
IMM GRANULOCYTES NFR BLD: 0.5 %
LYMPHOCYTES # BLD AUTO: 1.4 10E3/UL (ref 0.8–5.3)
LYMPHOCYTES NFR BLD AUTO: 32.5 %
MCH RBC QN AUTO: 24.2 PG (ref 26.5–33)
MCHC RBC AUTO-ENTMCNC: 30 G/DL (ref 31.5–36.5)
MCV RBC AUTO: 80.6 FL (ref 78–100)
MONOCYTES # BLD AUTO: 0.34 10E3/UL (ref 0–1.3)
MONOCYTES NFR BLD AUTO: 7.9 %
NEUTROPHILS # BLD AUTO: 2.23 10E3/UL (ref 1.6–8.3)
NEUTROPHILS NFR BLD AUTO: 51.6 %
PLATELET # BLD AUTO: 310 10E3/UL (ref 150–450)
POTASSIUM SERPL-SCNC: 3.7 MMOL/L (ref 3.4–5.3)
PREALB SERPL-MCNC: 15.2 MG/DL (ref 20–40)
PROT SERPL-MCNC: 7 G/DL (ref 6.4–8.3)
RBC # BLD AUTO: 4.13 10E6/UL (ref 3.8–5.2)
SODIUM SERPL-SCNC: 141 MMOL/L (ref 135–145)
WBC # BLD AUTO: 4.31 10E3/UL (ref 4–11)

## 2025-08-25 PROCEDURE — 36415 COLL VENOUS BLD VENIPUNCTURE: CPT

## 2025-08-25 PROCEDURE — 86850 RBC ANTIBODY SCREEN: CPT

## 2025-08-25 PROCEDURE — 86901 BLOOD TYPING SEROLOGIC RH(D): CPT

## 2025-08-25 PROCEDURE — 86900 BLOOD TYPING SEROLOGIC ABO: CPT

## 2025-08-25 PROCEDURE — 84134 ASSAY OF PREALBUMIN: CPT

## 2025-08-25 PROCEDURE — 80053 COMPREHEN METABOLIC PANEL: CPT

## 2025-08-25 PROCEDURE — 85025 COMPLETE CBC W/AUTO DIFF WBC: CPT

## 2025-08-28 ENCOUNTER — HOSPITAL ENCOUNTER (OUTPATIENT)
Facility: CLINIC | Age: 60
Discharge: HOME | End: 2025-08-28
Attending: STUDENT IN AN ORGANIZED HEALTH CARE EDUCATION/TRAINING PROGRAM | Admitting: STUDENT IN AN ORGANIZED HEALTH CARE EDUCATION/TRAINING PROGRAM
Payer: COMMERCIAL

## 2025-08-28 ENCOUNTER — RESULTS FOLLOW-UP (OUTPATIENT)
Dept: GASTROENTEROLOGY | Facility: CLINIC | Age: 60
End: 2025-08-28
Payer: COMMERCIAL

## 2025-08-28 VITALS
BODY MASS INDEX: 23.37 KG/M2 | WEIGHT: 127 LBS | HEIGHT: 62 IN | RESPIRATION RATE: 11 BRPM | OXYGEN SATURATION: 95 % | HEART RATE: 65 BPM | SYSTOLIC BLOOD PRESSURE: 118 MMHG | DIASTOLIC BLOOD PRESSURE: 84 MMHG

## 2025-08-28 LAB — COLONOSCOPY: NORMAL

## 2025-08-28 PROCEDURE — G0121 COLON CA SCRN NOT HI RSK IND: HCPCS | Mod: 74 | Performed by: STUDENT IN AN ORGANIZED HEALTH CARE EDUCATION/TRAINING PROGRAM

## 2025-08-28 PROCEDURE — G0500 MOD SEDAT ENDO SERVICE >5YRS: HCPCS | Performed by: STUDENT IN AN ORGANIZED HEALTH CARE EDUCATION/TRAINING PROGRAM

## 2025-08-28 PROCEDURE — 45378 DIAGNOSTIC COLONOSCOPY: CPT | Performed by: STUDENT IN AN ORGANIZED HEALTH CARE EDUCATION/TRAINING PROGRAM

## 2025-08-28 PROCEDURE — 250N000013 HC RX MED GY IP 250 OP 250 PS 637: Performed by: STUDENT IN AN ORGANIZED HEALTH CARE EDUCATION/TRAINING PROGRAM

## 2025-08-28 PROCEDURE — 250N000011 HC RX IP 250 OP 636: Performed by: STUDENT IN AN ORGANIZED HEALTH CARE EDUCATION/TRAINING PROGRAM

## 2025-08-28 RX ORDER — FENTANYL CITRATE 50 UG/ML
INJECTION, SOLUTION INTRAMUSCULAR; INTRAVENOUS PRN
Status: DISCONTINUED | OUTPATIENT
Start: 2025-08-28 | End: 2025-08-28 | Stop reason: HOSPADM

## 2025-08-28 ASSESSMENT — ENCOUNTER SYMPTOMS
SEIZURES: 0
DYSRHYTHMIAS: 0

## 2025-08-28 ASSESSMENT — ACTIVITIES OF DAILY LIVING (ADL)
ADLS_ACUITY_SCORE: 55

## 2025-08-28 ASSESSMENT — LIFESTYLE VARIABLES: TOBACCO_USE: 1

## 2025-08-29 ENCOUNTER — ANESTHESIA (OUTPATIENT)
Dept: SURGERY | Facility: CLINIC | Age: 60
DRG: 330 | End: 2025-08-29
Payer: COMMERCIAL

## 2025-08-29 PROCEDURE — 250N000011 HC RX IP 250 OP 636

## 2025-08-29 PROCEDURE — 250N000011 HC RX IP 250 OP 636: Performed by: COLON & RECTAL SURGERY

## 2025-08-29 PROCEDURE — 250N000011 HC RX IP 250 OP 636: Performed by: NURSE ANESTHETIST, CERTIFIED REGISTERED

## 2025-08-29 PROCEDURE — 258N000003 HC RX IP 258 OP 636: Performed by: NURSE ANESTHETIST, CERTIFIED REGISTERED

## 2025-08-29 PROCEDURE — 250N000009 HC RX 250

## 2025-08-29 PROCEDURE — 250N000009 HC RX 250: Performed by: NURSE ANESTHETIST, CERTIFIED REGISTERED

## 2025-08-29 RX ORDER — FENTANYL CITRATE 50 UG/ML
INJECTION, SOLUTION INTRAMUSCULAR; INTRAVENOUS PRN
Status: DISCONTINUED | OUTPATIENT
Start: 2025-08-29 | End: 2025-08-29

## 2025-08-29 RX ORDER — LIDOCAINE HYDROCHLORIDE 20 MG/ML
INJECTION, SOLUTION INFILTRATION; PERINEURAL PRN
Status: DISCONTINUED | OUTPATIENT
Start: 2025-08-29 | End: 2025-08-29

## 2025-08-29 RX ORDER — DEXAMETHASONE SODIUM PHOSPHATE 4 MG/ML
INJECTION, SOLUTION INTRA-ARTICULAR; INTRALESIONAL; INTRAMUSCULAR; INTRAVENOUS; SOFT TISSUE PRN
Status: DISCONTINUED | OUTPATIENT
Start: 2025-08-29 | End: 2025-08-29

## 2025-08-29 RX ORDER — PROPOFOL 10 MG/ML
INJECTION, EMULSION INTRAVENOUS PRN
Status: DISCONTINUED | OUTPATIENT
Start: 2025-08-29 | End: 2025-08-29

## 2025-08-29 RX ORDER — SODIUM CHLORIDE, SODIUM LACTATE, POTASSIUM CHLORIDE, CALCIUM CHLORIDE 600; 310; 30; 20 MG/100ML; MG/100ML; MG/100ML; MG/100ML
INJECTION, SOLUTION INTRAVENOUS CONTINUOUS PRN
Status: DISCONTINUED | OUTPATIENT
Start: 2025-08-29 | End: 2025-08-29

## 2025-08-29 RX ORDER — ONDANSETRON 2 MG/ML
INJECTION INTRAMUSCULAR; INTRAVENOUS PRN
Status: DISCONTINUED | OUTPATIENT
Start: 2025-08-29 | End: 2025-08-29

## 2025-08-29 RX ADMIN — Medication 10 MG: at 18:26

## 2025-08-29 RX ADMIN — Medication 100 MG: at 20:18

## 2025-08-29 RX ADMIN — Medication 10 MG: at 18:47

## 2025-08-29 RX ADMIN — DEXAMETHASONE SODIUM PHOSPHATE 4 MG: 4 INJECTION, SOLUTION INTRAMUSCULAR; INTRAVENOUS at 18:41

## 2025-08-29 RX ADMIN — FENTANYL CITRATE 25 MCG: 50 INJECTION INTRAMUSCULAR; INTRAVENOUS at 20:14

## 2025-08-29 RX ADMIN — Medication 40 MG: at 17:14

## 2025-08-29 RX ADMIN — PHENYLEPHRINE HYDROCHLORIDE 100 MCG: 10 INJECTION INTRAVENOUS at 19:44

## 2025-08-29 RX ADMIN — PHENYLEPHRINE HYDROCHLORIDE 300 MCG: 10 INJECTION INTRAVENOUS at 17:32

## 2025-08-29 RX ADMIN — SODIUM CHLORIDE, SODIUM LACTATE, POTASSIUM CHLORIDE, AND CALCIUM CHLORIDE: .6; .31; .03; .02 INJECTION, SOLUTION INTRAVENOUS at 17:04

## 2025-08-29 RX ADMIN — Medication 2 G: at 17:36

## 2025-08-29 RX ADMIN — FENTANYL CITRATE 25 MCG: 50 INJECTION INTRAMUSCULAR; INTRAVENOUS at 20:20

## 2025-08-29 RX ADMIN — ONDANSETRON 4 MG: 2 INJECTION INTRAMUSCULAR; INTRAVENOUS at 19:42

## 2025-08-29 RX ADMIN — FENTANYL CITRATE 100 MCG: 50 INJECTION INTRAMUSCULAR; INTRAVENOUS at 17:14

## 2025-08-29 RX ADMIN — SODIUM CHLORIDE, SODIUM LACTATE, POTASSIUM CHLORIDE, AND CALCIUM CHLORIDE: .6; .31; .03; .02 INJECTION, SOLUTION INTRAVENOUS at 18:32

## 2025-08-29 RX ADMIN — PHENYLEPHRINE HYDROCHLORIDE 100 MCG: 10 INJECTION INTRAVENOUS at 19:33

## 2025-08-29 RX ADMIN — PROPOFOL 100 MG: 10 INJECTION, EMULSION INTRAVENOUS at 17:14

## 2025-08-29 RX ADMIN — PHENYLEPHRINE HYDROCHLORIDE 100 MCG: 10 INJECTION INTRAVENOUS at 17:18

## 2025-08-29 RX ADMIN — PHENYLEPHRINE HYDROCHLORIDE 100 MCG: 10 INJECTION INTRAVENOUS at 19:10

## 2025-08-29 RX ADMIN — LIDOCAINE HYDROCHLORIDE 100 MG: 20 INJECTION, SOLUTION INFILTRATION; PERINEURAL at 17:14

## 2025-08-29 RX ADMIN — PHENYLEPHRINE HYDROCHLORIDE 200 MCG: 10 INJECTION INTRAVENOUS at 17:24

## 2025-08-29 RX ADMIN — PHENYLEPHRINE HYDROCHLORIDE 100 MCG: 10 INJECTION INTRAVENOUS at 19:42

## 2025-08-29 ASSESSMENT — COPD QUESTIONNAIRES: COPD: 0

## 2025-09-03 ENCOUNTER — PATIENT OUTREACH (OUTPATIENT)
Dept: CARE COORDINATION | Facility: CLINIC | Age: 60
End: 2025-09-03
Payer: COMMERCIAL

## 2025-09-04 ENCOUNTER — PATIENT OUTREACH (OUTPATIENT)
Dept: SURGERY | Facility: CLINIC | Age: 60
End: 2025-09-04
Payer: COMMERCIAL

## (undated) RX ORDER — FENTANYL CITRATE 50 UG/ML
INJECTION, SOLUTION INTRAMUSCULAR; INTRAVENOUS
Status: DISPENSED
Start: 2025-07-12

## (undated) RX ORDER — FENTANYL CITRATE 50 UG/ML
INJECTION, SOLUTION INTRAMUSCULAR; INTRAVENOUS
Status: DISPENSED
Start: 2025-08-28

## (undated) RX ORDER — ATROPINE SULFATE 0.1 MG/ML
INJECTION INTRAVENOUS
Status: DISPENSED
Start: 2025-07-12